# Patient Record
Sex: FEMALE | Race: WHITE | Employment: OTHER
[De-identification: names, ages, dates, MRNs, and addresses within clinical notes are randomized per-mention and may not be internally consistent; named-entity substitution may affect disease eponyms.]

---

## 2017-01-30 ENCOUNTER — OFFICE VISIT (OUTPATIENT)
Dept: OBGYN | Facility: CLINIC | Age: 82
End: 2017-01-30

## 2017-01-30 VITALS
HEART RATE: 68 BPM | DIASTOLIC BLOOD PRESSURE: 80 MMHG | HEIGHT: 68 IN | RESPIRATION RATE: 16 BRPM | SYSTOLIC BLOOD PRESSURE: 136 MMHG | WEIGHT: 202 LBS | BODY MASS INDEX: 30.62 KG/M2

## 2017-01-30 DIAGNOSIS — Z01.411 ENCOUNTER FOR GYNECOLOGICAL EXAMINATION WITH ABNORMAL FINDING: Primary | ICD-10-CM

## 2017-01-30 DIAGNOSIS — Z00.00 ENCOUNTER FOR PREVENTIVE HEALTH EXAMINATION: ICD-10-CM

## 2017-01-30 DIAGNOSIS — Z12.31 ENCOUNTER FOR SCREENING MAMMOGRAM FOR BREAST CANCER: ICD-10-CM

## 2017-01-30 DIAGNOSIS — N81.11 MIDLINE CYSTOCELE: ICD-10-CM

## 2017-01-30 PROCEDURE — 57160 INSERT PESSARY/OTHER DEVICE: CPT | Performed by: OBSTETRICS & GYNECOLOGY

## 2017-01-30 PROCEDURE — A4562 PESSARY, NON RUBBER,ANY TYPE: HCPCS | Performed by: OBSTETRICS & GYNECOLOGY

## 2017-02-02 ENCOUNTER — OFFICE VISIT (OUTPATIENT)
Dept: OBGYN | Facility: CLINIC | Age: 82
End: 2017-02-02

## 2017-02-02 VITALS
DIASTOLIC BLOOD PRESSURE: 70 MMHG | WEIGHT: 202 LBS | SYSTOLIC BLOOD PRESSURE: 168 MMHG | BODY MASS INDEX: 30.62 KG/M2 | HEIGHT: 68 IN

## 2017-02-02 DIAGNOSIS — N39.498 OTHER URINARY INCONTINENCE: ICD-10-CM

## 2017-02-02 DIAGNOSIS — N81.10 CYSTOURETHROCELE: Primary | ICD-10-CM

## 2017-02-02 PROCEDURE — 99213 OFFICE O/P EST LOW 20 MIN: CPT | Performed by: OBSTETRICS & GYNECOLOGY

## 2017-02-09 ENCOUNTER — OFFICE VISIT (OUTPATIENT)
Dept: OBGYN | Facility: CLINIC | Age: 82
End: 2017-02-09

## 2017-02-09 VITALS
DIASTOLIC BLOOD PRESSURE: 76 MMHG | SYSTOLIC BLOOD PRESSURE: 130 MMHG | BODY MASS INDEX: 30.46 KG/M2 | HEIGHT: 68 IN | WEIGHT: 201 LBS

## 2017-02-09 DIAGNOSIS — N81.10 CYSTOURETHROCELE: Primary | ICD-10-CM

## 2017-02-09 PROCEDURE — 99213 OFFICE O/P EST LOW 20 MIN: CPT | Performed by: OBSTETRICS & GYNECOLOGY

## 2017-03-27 ENCOUNTER — OFFICE VISIT (OUTPATIENT)
Dept: OBGYN | Age: 82
End: 2017-03-27
Payer: MEDICARE

## 2017-03-27 VITALS
RESPIRATION RATE: 18 BRPM | WEIGHT: 201 LBS | HEIGHT: 68 IN | DIASTOLIC BLOOD PRESSURE: 82 MMHG | BODY MASS INDEX: 30.46 KG/M2 | SYSTOLIC BLOOD PRESSURE: 142 MMHG

## 2017-03-27 DIAGNOSIS — N81.10 CYSTOURETHROCELE: Primary | ICD-10-CM

## 2017-03-27 PROCEDURE — 4040F PNEUMOC VAC/ADMIN/RCVD: CPT | Performed by: OBSTETRICS & GYNECOLOGY

## 2017-03-27 PROCEDURE — G8417 CALC BMI ABV UP PARAM F/U: HCPCS | Performed by: OBSTETRICS & GYNECOLOGY

## 2017-03-27 PROCEDURE — 99213 OFFICE O/P EST LOW 20 MIN: CPT | Performed by: OBSTETRICS & GYNECOLOGY

## 2017-03-27 PROCEDURE — G8427 DOCREV CUR MEDS BY ELIG CLIN: HCPCS | Performed by: OBSTETRICS & GYNECOLOGY

## 2017-03-27 PROCEDURE — 1036F TOBACCO NON-USER: CPT | Performed by: OBSTETRICS & GYNECOLOGY

## 2017-03-27 PROCEDURE — G8400 PT W/DXA NO RESULTS DOC: HCPCS | Performed by: OBSTETRICS & GYNECOLOGY

## 2017-03-27 PROCEDURE — G8484 FLU IMMUNIZE NO ADMIN: HCPCS | Performed by: OBSTETRICS & GYNECOLOGY

## 2017-03-27 PROCEDURE — 1123F ACP DISCUSS/DSCN MKR DOCD: CPT | Performed by: OBSTETRICS & GYNECOLOGY

## 2017-03-27 PROCEDURE — 1090F PRES/ABSN URINE INCON ASSESS: CPT | Performed by: OBSTETRICS & GYNECOLOGY

## 2017-03-28 DIAGNOSIS — N81.6 RECTOCELE: Primary | ICD-10-CM

## 2017-06-15 ENCOUNTER — HOSPITAL ENCOUNTER (OUTPATIENT)
Dept: LAB | Age: 82
Discharge: HOME | End: 2017-06-15
Payer: MEDICARE

## 2017-06-15 DIAGNOSIS — I10 ESSENTIAL HYPERTENSION: ICD-10-CM

## 2017-06-15 DIAGNOSIS — E78.2 MIXED HYPERLIPIDEMIA: ICD-10-CM

## 2017-06-15 DIAGNOSIS — R73.09 ABNORMAL GLUCOSE: ICD-10-CM

## 2017-06-16 ENCOUNTER — HOSPITAL ENCOUNTER (OUTPATIENT)
Dept: LAB | Age: 82
Discharge: HOME | End: 2017-06-16
Payer: MEDICARE

## 2017-06-16 LAB
ANION GAP SERPL CALCULATED.3IONS-SCNC: 14 MMOL/L (ref 9–17)
BUN BLDV-MCNC: 13 MG/DL (ref 8–23)
BUN/CREAT BLD: 20 (ref 9–20)
CALCIUM SERPL-MCNC: 9.5 MG/DL (ref 8.6–10.4)
CHLORIDE BLD-SCNC: 95 MMOL/L (ref 98–107)
CHOLESTEROL/HDL RATIO: 2.3
CHOLESTEROL: 153 MG/DL
CO2: 25 MMOL/L (ref 20–31)
CREAT SERPL-MCNC: 0.65 MG/DL (ref 0.5–0.9)
ESTIMATED AVERAGE GLUCOSE: 123 MG/DL
GFR AFRICAN AMERICAN: >60 ML/MIN
GFR NON-AFRICAN AMERICAN: >60 ML/MIN
GFR SERPL CREATININE-BSD FRML MDRD: ABNORMAL ML/MIN/{1.73_M2}
GFR SERPL CREATININE-BSD FRML MDRD: ABNORMAL ML/MIN/{1.73_M2}
GLUCOSE BLD-MCNC: 131 MG/DL (ref 70–99)
HBA1C MFR BLD: 5.9 % (ref 4.8–5.9)
HDLC SERPL-MCNC: 67 MG/DL
LDL CHOLESTEROL: 72 MG/DL (ref 0–130)
POTASSIUM SERPL-SCNC: 4.6 MMOL/L (ref 3.7–5.3)
SODIUM BLD-SCNC: 134 MMOL/L (ref 135–144)
TRIGL SERPL-MCNC: 69 MG/DL
VLDLC SERPL CALC-MCNC: NORMAL MG/DL (ref 1–30)

## 2017-06-16 PROCEDURE — 36415 COLL VENOUS BLD VENIPUNCTURE: CPT

## 2017-06-16 PROCEDURE — 80048 BASIC METABOLIC PNL TOTAL CA: CPT

## 2017-06-16 PROCEDURE — 83036 HEMOGLOBIN GLYCOSYLATED A1C: CPT

## 2017-06-16 PROCEDURE — 80061 LIPID PANEL: CPT

## 2017-12-06 ENCOUNTER — HOSPITAL ENCOUNTER (OUTPATIENT)
Dept: GENERAL RADIOLOGY | Age: 82
Discharge: HOME | End: 2017-12-06
Payer: MEDICARE

## 2017-12-06 ENCOUNTER — HOSPITAL ENCOUNTER (OUTPATIENT)
Dept: LAB | Age: 82
Discharge: HOME | End: 2017-12-06
Payer: MEDICARE

## 2017-12-06 DIAGNOSIS — M54.50 CHRONIC MIDLINE LOW BACK PAIN WITHOUT SCIATICA: ICD-10-CM

## 2017-12-06 DIAGNOSIS — G89.29 CHRONIC MIDLINE LOW BACK PAIN WITHOUT SCIATICA: ICD-10-CM

## 2017-12-06 DIAGNOSIS — H20.9 IRITIS OF RIGHT EYE: ICD-10-CM

## 2017-12-06 DIAGNOSIS — M45.9 ANKYLOSING SPONDYLITIS, UNSPECIFIED SITE OF SPINE (HCC): ICD-10-CM

## 2017-12-06 LAB
ANGIOTENSIN-CONVERTING ENZYME: 61 U/L (ref 8–52)
C-REACTIVE PROTEIN: 1.1 MG/L (ref 0–5)
SEDIMENTATION RATE, ERYTHROCYTE: 21 MM (ref 0–20)

## 2017-12-06 PROCEDURE — 85651 RBC SED RATE NONAUTOMATED: CPT

## 2017-12-06 PROCEDURE — 86140 C-REACTIVE PROTEIN: CPT

## 2017-12-06 PROCEDURE — 82164 ANGIOTENSIN I ENZYME TEST: CPT

## 2017-12-06 PROCEDURE — 72100 X-RAY EXAM L-S SPINE 2/3 VWS: CPT

## 2017-12-06 PROCEDURE — 86812 HLA TYPING A B OR C: CPT

## 2017-12-06 PROCEDURE — 36415 COLL VENOUS BLD VENIPUNCTURE: CPT

## 2017-12-06 PROCEDURE — 85549 MURAMIDASE: CPT

## 2017-12-08 LAB — HLA B27: POSITIVE

## 2017-12-11 LAB — LYSOZYME: 1.52 UG/ML (ref 0–2.75)

## 2018-01-31 PROBLEM — Z15.89 HLA B27 (HLA B27 POSITIVE): Status: ACTIVE | Noted: 2018-01-31

## 2018-08-24 ENCOUNTER — HOSPITAL ENCOUNTER (EMERGENCY)
Age: 83
Discharge: HOME | End: 2018-08-24
Payer: MEDICARE

## 2018-08-24 VITALS
SYSTOLIC BLOOD PRESSURE: 166 MMHG | RESPIRATION RATE: 18 BRPM | OXYGEN SATURATION: 96 % | WEIGHT: 200 LBS | HEART RATE: 61 BPM | TEMPERATURE: 98.1 F | DIASTOLIC BLOOD PRESSURE: 84 MMHG | BODY MASS INDEX: 31.32 KG/M2

## 2018-08-24 DIAGNOSIS — K64.4 EXTERNAL BLEEDING HEMORRHOIDS: Primary | ICD-10-CM

## 2018-08-24 LAB
ABSOLUTE EOS #: 0.34 K/UL (ref 0–0.44)
ABSOLUTE IMMATURE GRANULOCYTE: 0.05 K/UL (ref 0–0.3)
ABSOLUTE LYMPH #: 1.65 K/UL (ref 1.1–3.7)
ABSOLUTE MONO #: 0.98 K/UL (ref 0.1–1.2)
ALBUMIN SERPL-MCNC: 4.1 G/DL (ref 3.5–5.2)
ALBUMIN/GLOBULIN RATIO: 1.4 (ref 1–2.5)
ALP BLD-CCNC: 77 U/L (ref 35–104)
ALT SERPL-CCNC: 6 U/L (ref 5–33)
ANION GAP SERPL CALCULATED.3IONS-SCNC: 14 MMOL/L (ref 9–17)
AST SERPL-CCNC: 18 U/L
BASOPHILS # BLD: 0 % (ref 0–2)
BASOPHILS ABSOLUTE: <0.03 K/UL (ref 0–0.2)
BILIRUB SERPL-MCNC: 0.68 MG/DL (ref 0.3–1.2)
BUN BLDV-MCNC: 13 MG/DL (ref 8–23)
BUN/CREAT BLD: 21 (ref 9–20)
CALCIUM SERPL-MCNC: 9.5 MG/DL (ref 8.6–10.4)
CHLORIDE BLD-SCNC: 96 MMOL/L (ref 98–107)
CO2: 24 MMOL/L (ref 20–31)
CREAT SERPL-MCNC: 0.61 MG/DL (ref 0.5–0.9)
DIFFERENTIAL TYPE: ABNORMAL
EOSINOPHILS RELATIVE PERCENT: 5 % (ref 1–4)
GFR AFRICAN AMERICAN: >60 ML/MIN
GFR NON-AFRICAN AMERICAN: >60 ML/MIN
GFR SERPL CREATININE-BSD FRML MDRD: ABNORMAL ML/MIN/{1.73_M2}
GFR SERPL CREATININE-BSD FRML MDRD: ABNORMAL ML/MIN/{1.73_M2}
GLUCOSE BLD-MCNC: 140 MG/DL (ref 70–99)
HCT VFR BLD CALC: 39.6 % (ref 36.3–47.1)
HEMOGLOBIN: 13.6 G/DL (ref 11.9–15.1)
IMMATURE GRANULOCYTES: 1 %
LYMPHOCYTES # BLD: 22 % (ref 24–43)
MCH RBC QN AUTO: 31.3 PG (ref 25.2–33.5)
MCHC RBC AUTO-ENTMCNC: 34.3 G/DL (ref 28.4–34.8)
MCV RBC AUTO: 91 FL (ref 82.6–102.9)
MONOCYTES # BLD: 13 % (ref 3–12)
NRBC AUTOMATED: 0 PER 100 WBC
PDW BLD-RTO: 12.4 % (ref 11.8–14.4)
PLATELET # BLD: 228 K/UL (ref 138–453)
PLATELET ESTIMATE: ABNORMAL
PMV BLD AUTO: 9.8 FL (ref 8.1–13.5)
POTASSIUM SERPL-SCNC: 3.7 MMOL/L (ref 3.7–5.3)
RBC # BLD: 4.35 M/UL (ref 3.95–5.11)
RBC # BLD: ABNORMAL 10*6/UL
SEG NEUTROPHILS: 59 % (ref 36–65)
SEGMENTED NEUTROPHILS ABSOLUTE COUNT: 4.6 K/UL (ref 1.5–8.1)
SODIUM BLD-SCNC: 134 MMOL/L (ref 135–144)
TOTAL PROTEIN: 7.1 G/DL (ref 6.4–8.3)
WBC # BLD: 7.6 K/UL (ref 3.5–11.3)
WBC # BLD: ABNORMAL 10*3/UL

## 2018-08-24 PROCEDURE — 99283 EMERGENCY DEPT VISIT LOW MDM: CPT

## 2018-08-24 PROCEDURE — 85025 COMPLETE CBC W/AUTO DIFF WBC: CPT

## 2018-08-24 PROCEDURE — 80053 COMPREHEN METABOLIC PANEL: CPT

## 2018-08-24 ASSESSMENT — ENCOUNTER SYMPTOMS
DIARRHEA: 0
ABDOMINAL PAIN: 0
RECTAL PAIN: 0
VOMITING: 0
CONSTIPATION: 0
NAUSEA: 0
ANAL BLEEDING: 1

## 2018-08-24 NOTE — ED NOTES
[facility].       Edith Kimball  08/24/18 2126

## 2018-08-24 NOTE — ED PROVIDER NOTES
[facility] [[email or web address]] ED  eMERGENCY dEPARTMENT eNCOUnter      Pt Name: Ailyn Rubi  MRN: [id number]  [place] 11/4/1933  Date of evaluation: 8/24/2018  Provider: Eric Salas PA-C,    [address] [place] [address]       Chief Complaint   Patient presents with    Rectal Bleeding     pt states she had 2 bright red stools this am       HISTORY OF PRESENT ILLNESS    Ailyn Rubi is a 80 y.o. female who presents to the emergency department from home Place of bright red rectal bleeding which started this morning. Patient denies any abdominal or rectal pain, hematuria, nausea or vomiting or diarrhea, constipation, lightheadedness or any other complaints at present. Triage notes and Nursing notes were reviewed by myself. Any discrepancies are addressed above. PAST MEDICAL HISTORY     Past Medical History:   Diagnosis Date    Allergic rhinitis     Anxiety     Arthritis     DVT (deep venous thrombosis) ([facility] [address] 1970    rt leg       Essential hypertension     Hyperlipidemia     Impaired fasting glucose 2010    Osteoarthritis of right knee / Right TKA 2014 8/1/2014    PONV (postoperative nausea and vomiting)     Shingles 2009    left arm    Stroke [facility])     Cerebral atherosclerosis MRI confirmed       SURGICAL HISTORY       Past Surgical History:   Procedure Laterality Date    APPENDECTOMY      BREAST SURGERY Left     bx    EYE SURGERY      lorena cataracts    JOINT REPLACEMENT Left 1/11/2016    LEFT TOTAL KNEE [place]b/    KNEE ARTHROPLASTY Right 2014    TOTAL KNEE    KNEE ARTHROSCOPY Left 05/16/2016    Dr. Daniel Members Right 2-       CURRENT MEDICATIONS       Previous Medications    ACETAMINOPHEN (TYLENOL) 500 MG TABLET    Take 500 mg by mouth every 6 hours as needed for Pain.     AMLODIPINE (NORVASC) 5 MG TABLET    Take 1 tablet by mouth daily    ASPIRIN EC 81 MG EC TABLET    Take 1 tablet by mouth daily    CARVEDILOL (COREG) 25 MG TABLET    Take 1 tablet by mouth 2 times daily    HYDROCHLOROTHIAZIDE (HYDRODIURIL) 25 MG TABLET    Take 1 tablet by mouth daily    IBUPROFEN (ADVIL;MOTRIN) 200 MG TABLET    Take 400 mg by mouth every 6 hours as needed for Pain    KLOR-CON M10 10 MEQ EXTENDED RELEASE TABLET    TAKE 1 TABLET DAILY WITH BREAKFAST    MULTIPLE VITAMINS-MINERALS (THERAPEUTIC MULTIVITAMIN-MINERALS) TABLET    Take 1 tablet by mouth daily. OMEPRAZOLE (PRILOSEC) 40 MG DELAYED RELEASE CAPSULE        SCOPOLAMINE (TRANSDERM-SCOP, 1.5 MG,) TRANSDERMAL PATCH    Place 1 patch onto the skin every 72 hours    SIMVASTATIN (ZOCOR) 40 MG TABLET    Take 1 tablet by mouth daily       ALLERGIES     Percocet [oxycodone-acetaminophen]    FAMILY HISTORY       Family History   Problem Relation Age of Onset    Heart Disease Mother     Heart Disease Father     Heart Disease Brother     Heart Disease Sister     Cancer Brother     Heart Disease Sister     Cancer Sister         SOCIAL HISTORY       Social History     Social History    Marital status:      Spouse name: N/A    Number of children: N/A    Years of education: N/A     Social History Main Topics    Smoking status: Never Smoker    Smokeless tobacco: Never Used    Alcohol use No    Drug use: No    Sexual activity: Not Currently     Other Topics Concern    None     Social History Narrative    None       REVIEW OF SYSTEMS       Review of Systems   Constitutional: Negative for fever. Gastrointestinal: Positive for anal bleeding. Negative for abdominal pain, constipation, diarrhea, nausea, rectal pain and vomiting. Genitourinary: Negative for difficulty urinating, flank pain and hematuria. Neurological: Negative for light-headedness. All other systems reviewed and are negative.     Review of systems as in HPI & PMH otherwise negative    PHYSICAL EXAM    (up to 7 for level 4, 8 or more for level 5)     ED Triage Vitals [08/24/18 1145]   BP Temp Temp Source Pulse Resp SpO2 Height Weight   (!) 166/84 98.1 °F (36.7 °C) Tympanic 61 18 96 % -- 200 lb (90.7 kg)       Physical Exam   Constitutional: She appears well-developed and well-nourished. HENT:   Head: Normocephalic and atraumatic. Eyes: Conjunctivae are normal. No scleral icterus. Neck: Normal range of motion. Neck supple. Cardiovascular: Normal rate and regular rhythm. Pulmonary/Chest: Effort normal and breath sounds normal.   Abdominal: Soft. Bowel sounds are normal.   Genitourinary: Rectal exam shows guaiac positive stool. Genitourinary Comments: Large external hemorrhoid oozing blood, guaiac positive from the hemorrhoid,  brown stool   Musculoskeletal: Normal range of motion. Neurological: She is alert. Skin: Skin is warm. Psychiatric: She has a normal mood and affect. Her behavior is normal. Judgment and thought content normal.   Nursing note and vitals reviewed. DIAGNOSTIC RESULTS     EKG: (none if blank)  All EKG's are interpreted by the Emergency Department Physician who either signs or Co-signs this chart in the absence of a cardiologist.        RADIOLOGY: (none if blank)   Interpretation per the Radiologist below, if available at the time of this note:    No orders to display       LABS:  Labs Reviewed   CBC WITH AUTO DIFFERENTIAL - Abnormal; Notable for the following:        Result Value    Lymphocytes 22 (*)     Monocytes 13 (*)     Eosinophils % 5 (*)     Immature Granulocytes 1 (*)     All other components within normal limits   COMPREHENSIVE METABOLIC PANEL - Abnormal; Notable for the following:     Glucose 140 (*)     Bun/Cre Ratio 21 (*)     Sodium 134 (*)     Chloride 96 (*)     All other components within normal limits       All other labs were within normal range or not returned as of this dictation. EMERGENCY DEPARTMENT COURSE and Medical Decision Making:     MDM/     Nurse's notes were reviewed as were the vital signs. Labs were obtained results are reviewed.   Again patient does have a large external hemorrhoid which is not

## 2018-09-24 ENCOUNTER — HOSPITAL ENCOUNTER (OUTPATIENT)
Dept: LAB | Age: 83
Discharge: HOME | End: 2018-09-24
Payer: MEDICARE

## 2018-09-24 DIAGNOSIS — R73.01 IMPAIRED FASTING GLUCOSE: Chronic | ICD-10-CM

## 2018-09-24 DIAGNOSIS — E78.2 MIXED HYPERLIPIDEMIA: ICD-10-CM

## 2018-09-24 DIAGNOSIS — I10 ESSENTIAL HYPERTENSION: ICD-10-CM

## 2018-09-24 LAB
ALBUMIN SERPL-MCNC: 4.1 G/DL (ref 3.5–5.2)
ALBUMIN/GLOBULIN RATIO: 1.4 (ref 1–2.5)
ALP BLD-CCNC: 72 U/L (ref 35–104)
ALT SERPL-CCNC: 11 U/L (ref 5–33)
ANION GAP SERPL CALCULATED.3IONS-SCNC: 13 MMOL/L (ref 9–17)
AST SERPL-CCNC: 19 U/L
BILIRUB SERPL-MCNC: 0.45 MG/DL (ref 0.3–1.2)
BUN BLDV-MCNC: 16 MG/DL (ref 8–23)
BUN/CREAT BLD: 24 (ref 9–20)
CALCIUM SERPL-MCNC: 9.2 MG/DL (ref 8.6–10.4)
CHLORIDE BLD-SCNC: 95 MMOL/L (ref 98–107)
CHOLESTEROL, FASTING: 139 MG/DL
CHOLESTEROL/HDL RATIO: 2.5
CO2: 25 MMOL/L (ref 20–31)
CREAT SERPL-MCNC: 0.68 MG/DL (ref 0.5–0.9)
ESTIMATED AVERAGE GLUCOSE: 131 MG/DL
GFR AFRICAN AMERICAN: >60 ML/MIN
GFR NON-AFRICAN AMERICAN: >60 ML/MIN
GFR SERPL CREATININE-BSD FRML MDRD: ABNORMAL ML/MIN/{1.73_M2}
GFR SERPL CREATININE-BSD FRML MDRD: ABNORMAL ML/MIN/{1.73_M2}
GLUCOSE FASTING: 121 MG/DL (ref 70–99)
HBA1C MFR BLD: 6.2 % (ref 4.8–5.9)
HDLC SERPL-MCNC: 56 MG/DL
LDL CHOLESTEROL: 60 MG/DL (ref 0–130)
POTASSIUM SERPL-SCNC: 4.2 MMOL/L (ref 3.7–5.3)
SODIUM BLD-SCNC: 133 MMOL/L (ref 135–144)
TOTAL PROTEIN: 7.1 G/DL (ref 6.4–8.3)
TRIGLYCERIDE, FASTING: 113 MG/DL
VLDLC SERPL CALC-MCNC: NORMAL MG/DL (ref 1–30)

## 2018-09-24 PROCEDURE — 80061 LIPID PANEL: CPT

## 2018-09-24 PROCEDURE — 36415 COLL VENOUS BLD VENIPUNCTURE: CPT

## 2018-09-24 PROCEDURE — 80053 COMPREHEN METABOLIC PANEL: CPT

## 2018-09-24 PROCEDURE — 83036 HEMOGLOBIN GLYCOSYLATED A1C: CPT

## 2019-02-25 PROBLEM — G56.01 RIGHT CARPAL TUNNEL SYNDROME: Status: ACTIVE | Noted: 2019-02-25

## 2019-10-30 ENCOUNTER — HOSPITAL ENCOUNTER (OUTPATIENT)
Age: 84
Setting detail: OUTPATIENT SURGERY
Discharge: HOME | End: 2019-10-30
Attending: PHYSICAL MEDICINE & REHABILITATION | Admitting: PHYSICAL MEDICINE & REHABILITATION
Payer: MEDICARE

## 2019-10-30 ENCOUNTER — APPOINTMENT (OUTPATIENT)
Dept: GENERAL RADIOLOGY | Age: 84
End: 2019-10-30
Attending: PHYSICAL MEDICINE & REHABILITATION
Payer: MEDICARE

## 2019-10-30 VITALS
DIASTOLIC BLOOD PRESSURE: 71 MMHG | HEART RATE: 84 BPM | SYSTOLIC BLOOD PRESSURE: 151 MMHG | TEMPERATURE: 98 F | OXYGEN SATURATION: 96 % | HEIGHT: 67 IN | BODY MASS INDEX: 31.39 KG/M2 | WEIGHT: 200 LBS | RESPIRATION RATE: 18 BRPM

## 2019-10-30 PROBLEM — M53.3 SACROILIAC JOINT DYSFUNCTION OF RIGHT SIDE: Status: ACTIVE | Noted: 2019-10-30

## 2019-10-30 PROBLEM — G57.01 PIRIFORMIS SYNDROME OF RIGHT SIDE: Status: ACTIVE | Noted: 2019-10-30

## 2019-10-30 PROCEDURE — 2709999900 HC NON-CHARGEABLE SUPPLY: Performed by: PHYSICAL MEDICINE & REHABILITATION

## 2019-10-30 PROCEDURE — 3209999900 FLUORO FOR SURGICAL PROCEDURES

## 2019-10-30 PROCEDURE — 6360000004 HC RX CONTRAST MEDICATION: Performed by: PHYSICAL MEDICINE & REHABILITATION

## 2019-10-30 PROCEDURE — 2500000003 HC RX 250 WO HCPCS: Performed by: PHYSICAL MEDICINE & REHABILITATION

## 2019-10-30 PROCEDURE — 6360000002 HC RX W HCPCS: Performed by: PHYSICAL MEDICINE & REHABILITATION

## 2019-10-30 PROCEDURE — 3600000002 HC SURGERY LEVEL 2 BASE: Performed by: PHYSICAL MEDICINE & REHABILITATION

## 2019-10-30 RX ORDER — LIDOCAINE HYDROCHLORIDE 20 MG/ML
INJECTION, SOLUTION EPIDURAL; INFILTRATION; INTRACAUDAL; PERINEURAL PRN
Status: DISCONTINUED | OUTPATIENT
Start: 2019-10-30 | End: 2019-10-30 | Stop reason: ALTCHOICE

## 2019-10-30 RX ORDER — DEXAMETHASONE SODIUM PHOSPHATE 10 MG/ML
INJECTION INTRAMUSCULAR; INTRAVENOUS PRN
Status: DISCONTINUED | OUTPATIENT
Start: 2019-10-30 | End: 2019-10-30 | Stop reason: ALTCHOICE

## 2019-10-30 RX ORDER — LIDOCAINE HYDROCHLORIDE 10 MG/ML
INJECTION, SOLUTION EPIDURAL; INFILTRATION; INTRACAUDAL; PERINEURAL PRN
Status: DISCONTINUED | OUTPATIENT
Start: 2019-10-30 | End: 2019-10-30 | Stop reason: ALTCHOICE

## 2019-10-30 RX ORDER — BUPIVACAINE HYDROCHLORIDE 5 MG/ML
INJECTION, SOLUTION EPIDURAL; INTRACAUDAL PRN
Status: DISCONTINUED | OUTPATIENT
Start: 2019-10-30 | End: 2019-10-30 | Stop reason: ALTCHOICE

## 2019-10-30 ASSESSMENT — PAIN DESCRIPTION - ORIENTATION: ORIENTATION: RIGHT

## 2019-10-30 ASSESSMENT — PAIN DESCRIPTION - LOCATION: LOCATION: HIP

## 2019-10-30 ASSESSMENT — PAIN SCALES - GENERAL: PAINLEVEL_OUTOF10: 4

## 2019-10-30 ASSESSMENT — PAIN DESCRIPTION - PAIN TYPE: TYPE: CHRONIC PAIN

## 2020-01-09 ENCOUNTER — HOSPITAL ENCOUNTER (OUTPATIENT)
Dept: LAB | Age: 85
Discharge: HOME | End: 2020-01-09
Payer: MEDICARE

## 2020-01-09 LAB
ESTIMATED AVERAGE GLUCOSE: 131 MG/DL
HBA1C MFR BLD: 6.2 % (ref 4.8–5.9)

## 2020-01-09 PROCEDURE — 83036 HEMOGLOBIN GLYCOSYLATED A1C: CPT

## 2020-01-09 PROCEDURE — 36415 COLL VENOUS BLD VENIPUNCTURE: CPT

## 2020-07-07 ENCOUNTER — HOSPITAL ENCOUNTER (OUTPATIENT)
Age: 85
Discharge: HOME | End: 2020-07-09
Payer: MEDICARE

## 2020-07-07 ENCOUNTER — HOSPITAL ENCOUNTER (OUTPATIENT)
Dept: GENERAL RADIOLOGY | Age: 85
Discharge: HOME | End: 2020-07-09
Payer: MEDICARE

## 2020-07-07 PROCEDURE — 71046 X-RAY EXAM CHEST 2 VIEWS: CPT

## 2020-10-09 PROBLEM — M45.9 ANKYLOSING SPONDYLITIS (HCC): Status: ACTIVE | Noted: 2020-10-09

## 2020-10-20 ENCOUNTER — HOSPITAL ENCOUNTER (OUTPATIENT)
Dept: LAB | Age: 85
Discharge: HOME | End: 2020-10-20
Payer: MEDICARE

## 2020-10-20 LAB
ALBUMIN SERPL-MCNC: 4 G/DL (ref 3.5–5.2)
ALBUMIN/GLOBULIN RATIO: 1.3 (ref 1–2.5)
ALP BLD-CCNC: 74 U/L (ref 35–104)
ALT SERPL-CCNC: 10 U/L (ref 5–33)
ANION GAP SERPL CALCULATED.3IONS-SCNC: 10 MMOL/L (ref 9–17)
AST SERPL-CCNC: 15 U/L
BILIRUB SERPL-MCNC: 0.5 MG/DL (ref 0.3–1.2)
BUN BLDV-MCNC: 15 MG/DL (ref 8–23)
BUN/CREAT BLD: 21 (ref 9–20)
CALCIUM SERPL-MCNC: 9.1 MG/DL (ref 8.6–10.4)
CHLORIDE BLD-SCNC: 95 MMOL/L (ref 98–107)
CHOLESTEROL, FASTING: 141 MG/DL
CHOLESTEROL/HDL RATIO: 2.3
CO2: 27 MMOL/L (ref 20–31)
CREAT SERPL-MCNC: 0.72 MG/DL (ref 0.5–0.9)
CREATININE URINE: 66.6 MG/DL (ref 28–217)
ESTIMATED AVERAGE GLUCOSE: 143 MG/DL
GFR AFRICAN AMERICAN: >60 ML/MIN
GFR NON-AFRICAN AMERICAN: >60 ML/MIN
GFR SERPL CREATININE-BSD FRML MDRD: ABNORMAL ML/MIN/{1.73_M2}
GFR SERPL CREATININE-BSD FRML MDRD: ABNORMAL ML/MIN/{1.73_M2}
GLUCOSE FASTING: 129 MG/DL (ref 70–99)
HBA1C MFR BLD: 6.6 % (ref 4–6)
HDLC SERPL-MCNC: 62 MG/DL
LDL CHOLESTEROL: 65 MG/DL (ref 0–130)
MICROALBUMIN/CREAT 24H UR: 13 MG/L
MICROALBUMIN/CREAT UR-RTO: 20 MCG/MG CREAT
POTASSIUM SERPL-SCNC: 3.8 MMOL/L (ref 3.7–5.3)
SODIUM BLD-SCNC: 132 MMOL/L (ref 135–144)
TOTAL PROTEIN: 7.1 G/DL (ref 6.4–8.3)
TRIGLYCERIDE, FASTING: 71 MG/DL
VLDLC SERPL CALC-MCNC: NORMAL MG/DL (ref 1–30)

## 2020-10-20 PROCEDURE — 80061 LIPID PANEL: CPT

## 2020-10-20 PROCEDURE — 83036 HEMOGLOBIN GLYCOSYLATED A1C: CPT

## 2020-10-20 PROCEDURE — 82570 ASSAY OF URINE CREATININE: CPT

## 2020-10-20 PROCEDURE — 36415 COLL VENOUS BLD VENIPUNCTURE: CPT

## 2020-10-20 PROCEDURE — 82043 UR ALBUMIN QUANTITATIVE: CPT

## 2020-10-20 PROCEDURE — 80053 COMPREHEN METABOLIC PANEL: CPT

## 2021-04-12 PROBLEM — M62.81 ABNORMAL GAIT DUE TO MUSCLE WEAKNESS: Status: ACTIVE | Noted: 2021-04-12

## 2021-04-12 PROBLEM — R26.9 ABNORMAL GAIT DUE TO MUSCLE WEAKNESS: Status: ACTIVE | Noted: 2021-04-12

## 2021-04-12 PROBLEM — R06.09 DYSPNEA ON EXERTION: Status: ACTIVE | Noted: 2021-04-12

## 2021-04-12 PROBLEM — E11.9 TYPE 2 DIABETES MELLITUS WITHOUT COMPLICATION, WITHOUT LONG-TERM CURRENT USE OF INSULIN (HCC): Status: ACTIVE | Noted: 2021-04-12

## 2021-05-07 ENCOUNTER — HOSPITAL ENCOUNTER (OUTPATIENT)
Dept: CT IMAGING | Age: 86
Discharge: HOME | End: 2021-05-09
Payer: MEDICARE

## 2021-05-07 ENCOUNTER — HOSPITAL ENCOUNTER (OUTPATIENT)
Dept: NON INVASIVE DIAGNOSTICS | Age: 86
Discharge: HOME | End: 2021-05-07
Payer: MEDICARE

## 2021-05-07 DIAGNOSIS — R06.09 DYSPNEA ON EXERTION: ICD-10-CM

## 2021-05-07 LAB
LV EF: 65 %
LVEF MODALITY: NORMAL

## 2021-05-07 PROCEDURE — 71250 CT THORAX DX C-: CPT

## 2021-05-07 PROCEDURE — 93306 TTE W/DOPPLER COMPLETE: CPT

## 2021-05-26 ENCOUNTER — HOSPITAL ENCOUNTER (OUTPATIENT)
Age: 86
Discharge: HOME | End: 2021-05-26
Payer: MEDICARE

## 2021-05-26 DIAGNOSIS — R06.09 DYSPNEA ON EXERTION: ICD-10-CM

## 2021-05-26 LAB
ABSOLUTE EOS #: 0.3 K/UL (ref 0–0.44)
ABSOLUTE IMMATURE GRANULOCYTE: <0.03 K/UL (ref 0–0.3)
ABSOLUTE LYMPH #: 1.25 K/UL (ref 1.1–3.7)
ABSOLUTE MONO #: 1.04 K/UL (ref 0.1–1.2)
BASOPHILS # BLD: 1 % (ref 0–2)
BASOPHILS ABSOLUTE: 0.03 K/UL (ref 0–0.2)
DIFFERENTIAL TYPE: ABNORMAL
EOSINOPHILS RELATIVE PERCENT: 5 % (ref 1–4)
HCT VFR BLD CALC: 38.6 % (ref 36.3–47.1)
HEMOGLOBIN: 12.7 G/DL (ref 11.9–15.1)
IMMATURE GRANULOCYTES: 0 %
LYMPHOCYTES # BLD: 20 % (ref 24–43)
MCH RBC QN AUTO: 29.9 PG (ref 25.2–33.5)
MCHC RBC AUTO-ENTMCNC: 32.9 G/DL (ref 28.4–34.8)
MCV RBC AUTO: 90.8 FL (ref 82.6–102.9)
MONOCYTES # BLD: 17 % (ref 3–12)
NRBC AUTOMATED: 0 PER 100 WBC
PDW BLD-RTO: 14 % (ref 11.8–14.4)
PLATELET # BLD: 239 K/UL (ref 138–453)
PLATELET ESTIMATE: ABNORMAL
PMV BLD AUTO: 9.6 FL (ref 8.1–13.5)
RBC # BLD: 4.25 M/UL (ref 3.95–5.11)
RBC # BLD: ABNORMAL 10*6/UL
SEG NEUTROPHILS: 57 % (ref 36–65)
SEGMENTED NEUTROPHILS ABSOLUTE COUNT: 3.69 K/UL (ref 1.5–8.1)
WBC # BLD: 6.3 K/UL (ref 3.5–11.3)
WBC # BLD: ABNORMAL 10*3/UL

## 2021-05-26 PROCEDURE — 85025 COMPLETE CBC W/AUTO DIFF WBC: CPT

## 2021-05-26 PROCEDURE — 36415 COLL VENOUS BLD VENIPUNCTURE: CPT

## 2021-10-20 ENCOUNTER — HOSPITAL ENCOUNTER (OUTPATIENT)
Age: 86
Discharge: HOME | End: 2021-10-20
Payer: MEDICARE

## 2021-10-20 DIAGNOSIS — E11.9 TYPE 2 DIABETES MELLITUS WITHOUT COMPLICATION, WITHOUT LONG-TERM CURRENT USE OF INSULIN (HCC): ICD-10-CM

## 2021-10-20 DIAGNOSIS — E78.2 MIXED HYPERLIPIDEMIA: ICD-10-CM

## 2021-10-20 LAB
ALBUMIN SERPL-MCNC: 4.2 G/DL (ref 3.5–5.2)
ALBUMIN/GLOBULIN RATIO: 1.6 (ref 1–2.5)
ALP BLD-CCNC: 93 U/L (ref 35–104)
ALT SERPL-CCNC: 11 U/L (ref 5–33)
ANION GAP SERPL CALCULATED.3IONS-SCNC: 14 MMOL/L (ref 9–17)
AST SERPL-CCNC: 18 U/L
BILIRUB SERPL-MCNC: 0.53 MG/DL (ref 0.3–1.2)
BUN BLDV-MCNC: 16 MG/DL (ref 8–23)
BUN/CREAT BLD: 23 (ref 9–20)
CALCIUM SERPL-MCNC: 9.3 MG/DL (ref 8.6–10.4)
CHLORIDE BLD-SCNC: 95 MMOL/L (ref 98–107)
CHOLESTEROL, FASTING: 188 MG/DL
CHOLESTEROL/HDL RATIO: 2.8
CO2: 23 MMOL/L (ref 20–31)
CREAT SERPL-MCNC: 0.71 MG/DL (ref 0.5–0.9)
ESTIMATED AVERAGE GLUCOSE: 128 MG/DL
GFR AFRICAN AMERICAN: >60 ML/MIN
GFR NON-AFRICAN AMERICAN: >60 ML/MIN
GFR SERPL CREATININE-BSD FRML MDRD: ABNORMAL ML/MIN/{1.73_M2}
GFR SERPL CREATININE-BSD FRML MDRD: ABNORMAL ML/MIN/{1.73_M2}
GLUCOSE FASTING: 114 MG/DL (ref 70–99)
HBA1C MFR BLD: 6.1 % (ref 4–6)
HDLC SERPL-MCNC: 67 MG/DL
LDL CHOLESTEROL: 107 MG/DL (ref 0–130)
POTASSIUM SERPL-SCNC: 4 MMOL/L (ref 3.7–5.3)
SODIUM BLD-SCNC: 132 MMOL/L (ref 135–144)
TOTAL PROTEIN: 6.8 G/DL (ref 6.4–8.3)
TRIGLYCERIDE, FASTING: 71 MG/DL
VLDLC SERPL CALC-MCNC: NORMAL MG/DL (ref 1–30)

## 2021-10-20 PROCEDURE — 36415 COLL VENOUS BLD VENIPUNCTURE: CPT

## 2021-10-20 PROCEDURE — 80061 LIPID PANEL: CPT

## 2021-10-20 PROCEDURE — 80053 COMPREHEN METABOLIC PANEL: CPT

## 2021-10-20 PROCEDURE — 83036 HEMOGLOBIN GLYCOSYLATED A1C: CPT

## 2022-03-21 PROBLEM — M17.12 PRIMARY OSTEOARTHRITIS OF LEFT KNEE: Status: ACTIVE | Noted: 2022-03-21

## 2022-03-21 PROBLEM — M45.9 ANKYLOSING SPONDYLITIS (HCC): Status: RESOLVED | Noted: 2020-10-09 | Resolved: 2022-03-21

## 2022-03-21 PROBLEM — M25.562 LEFT LATERAL KNEE PAIN: Status: ACTIVE | Noted: 2022-03-21

## 2022-09-08 ENCOUNTER — HOSPITAL ENCOUNTER (OUTPATIENT)
Age: 87
Discharge: HOME | End: 2022-09-08
Payer: MEDICARE

## 2022-09-08 DIAGNOSIS — E11.9 TYPE 2 DIABETES MELLITUS WITHOUT COMPLICATION, WITHOUT LONG-TERM CURRENT USE OF INSULIN (HCC): ICD-10-CM

## 2022-09-08 DIAGNOSIS — E78.2 MIXED HYPERLIPIDEMIA: ICD-10-CM

## 2022-09-08 LAB
ANION GAP SERPL CALCULATED.3IONS-SCNC: 12 MMOL/L (ref 9–17)
BUN BLDV-MCNC: 13 MG/DL (ref 8–23)
BUN/CREAT BLD: 29 (ref 9–20)
CALCIUM SERPL-MCNC: 9.5 MG/DL (ref 8.6–10.4)
CHLORIDE BLD-SCNC: 90 MMOL/L (ref 98–107)
CHOLESTEROL, FASTING: 130 MG/DL
CHOLESTEROL/HDL RATIO: 2.3
CO2: 22 MMOL/L (ref 20–31)
CREAT SERPL-MCNC: 0.45 MG/DL (ref 0.5–0.9)
GFR AFRICAN AMERICAN: >60 ML/MIN
GFR NON-AFRICAN AMERICAN: >60 ML/MIN
GFR SERPL CREATININE-BSD FRML MDRD: ABNORMAL ML/MIN/{1.73_M2}
GFR SERPL CREATININE-BSD FRML MDRD: ABNORMAL ML/MIN/{1.73_M2}
GLUCOSE FASTING: 130 MG/DL (ref 70–99)
HDLC SERPL-MCNC: 56 MG/DL
LDL CHOLESTEROL: 58 MG/DL (ref 0–130)
POTASSIUM SERPL-SCNC: 4.2 MMOL/L (ref 3.7–5.3)
SODIUM BLD-SCNC: 124 MMOL/L (ref 135–144)
TRIGLYCERIDE, FASTING: 80 MG/DL

## 2022-09-08 PROCEDURE — 36415 COLL VENOUS BLD VENIPUNCTURE: CPT

## 2022-09-08 PROCEDURE — 80061 LIPID PANEL: CPT

## 2022-09-08 PROCEDURE — 83036 HEMOGLOBIN GLYCOSYLATED A1C: CPT

## 2022-09-08 PROCEDURE — 80048 BASIC METABOLIC PNL TOTAL CA: CPT

## 2022-09-09 LAB
ESTIMATED AVERAGE GLUCOSE: 131 MG/DL
HBA1C MFR BLD: 6.2 % (ref 4–6)

## 2023-06-27 ENCOUNTER — HOSPITAL ENCOUNTER (INPATIENT)
Age: 88
LOS: 1 days | Discharge: HOME | DRG: 305 | End: 2023-06-28
Attending: EMERGENCY MEDICINE | Admitting: INTERNAL MEDICINE
Payer: MEDICARE

## 2023-06-27 ENCOUNTER — APPOINTMENT (OUTPATIENT)
Dept: CT IMAGING | Age: 88
End: 2023-06-27
Payer: MEDICARE

## 2023-06-27 ENCOUNTER — HOSPITAL ENCOUNTER (EMERGENCY)
Age: 88
Discharge: ANOTHER HOSPITAL | End: 2023-06-27
Attending: STUDENT IN AN ORGANIZED HEALTH CARE EDUCATION/TRAINING PROGRAM
Payer: MEDICARE

## 2023-06-27 ENCOUNTER — APPOINTMENT (OUTPATIENT)
Dept: CT IMAGING | Age: 88
DRG: 305 | End: 2023-06-27
Payer: MEDICARE

## 2023-06-27 VITALS
OXYGEN SATURATION: 93 % | WEIGHT: 190 LBS | TEMPERATURE: 99.1 F | DIASTOLIC BLOOD PRESSURE: 67 MMHG | HEIGHT: 67 IN | SYSTOLIC BLOOD PRESSURE: 146 MMHG | HEART RATE: 69 BPM | RESPIRATION RATE: 14 BRPM | BODY MASS INDEX: 29.82 KG/M2

## 2023-06-27 DIAGNOSIS — R51.9 ACUTE NONINTRACTABLE HEADACHE, UNSPECIFIED HEADACHE TYPE: Primary | ICD-10-CM

## 2023-06-27 DIAGNOSIS — E11.9 TYPE 2 DIABETES MELLITUS WITHOUT COMPLICATION, WITHOUT LONG-TERM CURRENT USE OF INSULIN (HCC): ICD-10-CM

## 2023-06-27 DIAGNOSIS — I67.1 RIGHT INTERNAL CAROTID ARTERY ANEURYSM: Primary | ICD-10-CM

## 2023-06-27 DIAGNOSIS — I67.1 RIGHT INTERNAL CAROTID ARTERY ANEURYSM: ICD-10-CM

## 2023-06-27 DIAGNOSIS — I16.0 HYPERTENSIVE URGENCY: ICD-10-CM

## 2023-06-27 LAB
ANION GAP SERPL CALCULATED.3IONS-SCNC: 12 MMOL/L (ref 9–17)
BACTERIA URNS QL MICRO: ABNORMAL
BASOPHILS # BLD: 0.03 K/UL (ref 0–0.2)
BASOPHILS NFR BLD: 0 % (ref 0–2)
BILIRUB UR QL STRIP: NEGATIVE
BNP SERPL-MCNC: 622 PG/ML
BUN SERPL-MCNC: 16 MG/DL (ref 8–23)
BUN/CREAT SERPL: 30 (ref 9–20)
CALCIUM SERPL-MCNC: 9.2 MG/DL (ref 8.6–10.4)
CHLORIDE SERPL-SCNC: 88 MMOL/L (ref 98–107)
CLARITY UR: CLEAR
CO2 SERPL-SCNC: 22 MMOL/L (ref 20–31)
COLOR UR: YELLOW
CREAT SERPL-MCNC: 0.53 MG/DL (ref 0.5–0.9)
EOSINOPHIL # BLD: 0.4 K/UL (ref 0–0.44)
EOSINOPHILS RELATIVE PERCENT: 6 % (ref 1–4)
EPI CELLS #/AREA URNS HPF: ABNORMAL /HPF (ref 0–25)
ERYTHROCYTE [DISTWIDTH] IN BLOOD BY AUTOMATED COUNT: 12 % (ref 11.8–14.4)
GFR SERPL CREATININE-BSD FRML MDRD: >60 ML/MIN/1.73M2
GLUCOSE SERPL-MCNC: 136 MG/DL (ref 70–99)
GLUCOSE UR STRIP-MCNC: NEGATIVE MG/DL
HCT VFR BLD AUTO: 36.5 % (ref 36.3–47.1)
HGB BLD-MCNC: 12.9 G/DL (ref 11.9–15.1)
HGB UR QL STRIP.AUTO: NEGATIVE
IMM GRANULOCYTES # BLD AUTO: <0.03 K/UL (ref 0–0.3)
IMM GRANULOCYTES NFR BLD: 0 %
INR PPP: 1.1
KETONES UR STRIP-MCNC: NEGATIVE MG/DL
LEUKOCYTE ESTERASE UR QL STRIP: NEGATIVE
LYMPHOCYTES # BLD: 21 % (ref 24–43)
LYMPHOCYTES NFR BLD: 1.5 K/UL (ref 1.1–3.7)
MCH RBC QN AUTO: 31.8 PG (ref 25.2–33.5)
MCHC RBC AUTO-ENTMCNC: 35.3 G/DL (ref 28.4–34.8)
MCV RBC AUTO: 89.9 FL (ref 82.6–102.9)
MONOCYTES NFR BLD: 0.97 K/UL (ref 0.1–1.2)
MONOCYTES NFR BLD: 14 % (ref 3–12)
MUCOUS THREADS URNS QL MICRO: ABNORMAL
NEUTROPHILS NFR BLD: 59 % (ref 36–65)
NEUTS SEG NFR BLD: 4.21 K/UL (ref 1.5–8.1)
NITRITE UR QL STRIP: NEGATIVE
NRBC BLD-RTO: 0 PER 100 WBC
PARTIAL THROMBOPLASTIN TIME: 36.7 SEC (ref 26.8–34.8)
PH UR STRIP: 7 [PH] (ref 5–9)
PLATELET # BLD AUTO: 231 K/UL (ref 138–453)
PMV BLD AUTO: 9.3 FL (ref 8.1–13.5)
POTASSIUM SERPL-SCNC: 4 MMOL/L (ref 3.7–5.3)
PROT UR STRIP-MCNC: NEGATIVE MG/DL
PROTHROMBIN TIME: 14 SEC (ref 11.9–14.8)
RBC # BLD AUTO: 4.06 M/UL (ref 3.95–5.11)
RBC #/AREA URNS HPF: ABNORMAL /HPF (ref 0–2)
REASON FOR REJECTION: NORMAL
SODIUM SERPL-SCNC: 122 MMOL/L (ref 135–144)
SP GR UR STRIP: 1.01 (ref 1.01–1.02)
SPECIMEN SOURCE: NORMAL
TROPONIN I SERPL HS-MCNC: 18 NG/L (ref 0–14)
UROBILINOGEN UR STRIP-ACNC: NORMAL
WBC #/AREA URNS HPF: ABNORMAL /HPF (ref 0–5)
WBC OTHER # BLD: 7.1 K/UL (ref 3.5–11.3)
ZZ NTE CLEAN UP: ORDERED TEST: NORMAL

## 2023-06-27 PROCEDURE — 84484 ASSAY OF TROPONIN QUANT: CPT

## 2023-06-27 PROCEDURE — 36415 COLL VENOUS BLD VENIPUNCTURE: CPT

## 2023-06-27 PROCEDURE — 96375 TX/PRO/DX INJ NEW DRUG ADDON: CPT

## 2023-06-27 PROCEDURE — 80053 COMPREHEN METABOLIC PANEL: CPT

## 2023-06-27 PROCEDURE — 80048 BASIC METABOLIC PNL TOTAL CA: CPT

## 2023-06-27 PROCEDURE — 2580000003 HC RX 258: Performed by: STUDENT IN AN ORGANIZED HEALTH CARE EDUCATION/TRAINING PROGRAM

## 2023-06-27 PROCEDURE — 2500000003 HC RX 250 WO HCPCS: Performed by: STUDENT IN AN ORGANIZED HEALTH CARE EDUCATION/TRAINING PROGRAM

## 2023-06-27 PROCEDURE — 99285 EMERGENCY DEPT VISIT HI MDM: CPT

## 2023-06-27 PROCEDURE — 71260 CT THORAX DX C+: CPT

## 2023-06-27 PROCEDURE — 96365 THER/PROPH/DIAG IV INF INIT: CPT

## 2023-06-27 PROCEDURE — 81001 URINALYSIS AUTO W/SCOPE: CPT

## 2023-06-27 PROCEDURE — 85027 COMPLETE CBC AUTOMATED: CPT

## 2023-06-27 PROCEDURE — 2500000003 HC RX 250 WO HCPCS: Performed by: PEDIATRICS

## 2023-06-27 PROCEDURE — 6360000004 HC RX CONTRAST MEDICATION: Performed by: PEDIATRICS

## 2023-06-27 PROCEDURE — 93005 ELECTROCARDIOGRAM TRACING: CPT | Performed by: PEDIATRICS

## 2023-06-27 PROCEDURE — 6360000004 HC RX CONTRAST MEDICATION: Performed by: STUDENT IN AN ORGANIZED HEALTH CARE EDUCATION/TRAINING PROGRAM

## 2023-06-27 PROCEDURE — 83880 ASSAY OF NATRIURETIC PEPTIDE: CPT

## 2023-06-27 PROCEDURE — 74174 CTA ABD&PLVS W/CONTRAST: CPT

## 2023-06-27 PROCEDURE — 85610 PROTHROMBIN TIME: CPT

## 2023-06-27 PROCEDURE — 96366 THER/PROPH/DIAG IV INF ADDON: CPT

## 2023-06-27 PROCEDURE — 85730 THROMBOPLASTIN TIME PARTIAL: CPT

## 2023-06-27 PROCEDURE — 70450 CT HEAD/BRAIN W/O DYE: CPT

## 2023-06-27 PROCEDURE — 6360000002 HC RX W HCPCS: Performed by: STUDENT IN AN ORGANIZED HEALTH CARE EDUCATION/TRAINING PROGRAM

## 2023-06-27 PROCEDURE — 70498 CT ANGIOGRAPHY NECK: CPT

## 2023-06-27 RX ORDER — MORPHINE SULFATE 4 MG/ML
4 INJECTION, SOLUTION INTRAMUSCULAR; INTRAVENOUS ONCE
Status: DISCONTINUED | OUTPATIENT
Start: 2023-06-27 | End: 2023-06-27

## 2023-06-27 RX ORDER — 0.9 % SODIUM CHLORIDE 0.9 %
1000 INTRAVENOUS SOLUTION INTRAVENOUS ONCE
Status: COMPLETED | OUTPATIENT
Start: 2023-06-27 | End: 2023-06-27

## 2023-06-27 RX ORDER — NICARDIPINE HYDROCHLORIDE 0.1 MG/ML
2.5-15 INJECTION INTRAVENOUS CONTINUOUS
Status: DISCONTINUED | OUTPATIENT
Start: 2023-06-27 | End: 2023-06-28 | Stop reason: HOSPADM

## 2023-06-27 RX ORDER — ONDANSETRON 2 MG/ML
4 INJECTION INTRAMUSCULAR; INTRAVENOUS ONCE
Status: COMPLETED | OUTPATIENT
Start: 2023-06-27 | End: 2023-06-27

## 2023-06-27 RX ORDER — LORAZEPAM 2 MG/ML
0.5 INJECTION INTRAMUSCULAR ONCE
Status: COMPLETED | OUTPATIENT
Start: 2023-06-27 | End: 2023-06-27

## 2023-06-27 RX ADMIN — IOPAMIDOL 75 ML: 755 INJECTION, SOLUTION INTRAVENOUS at 14:45

## 2023-06-27 RX ADMIN — SODIUM CHLORIDE 1000 ML: 9 INJECTION, SOLUTION INTRAVENOUS at 15:45

## 2023-06-27 RX ADMIN — ONDANSETRON 4 MG: 2 INJECTION INTRAMUSCULAR; INTRAVENOUS at 17:48

## 2023-06-27 RX ADMIN — IOPAMIDOL 150 ML: 755 INJECTION, SOLUTION INTRAVENOUS at 23:51

## 2023-06-27 RX ADMIN — SODIUM CHLORIDE 5 MG/HR: 9 INJECTION, SOLUTION INTRAVENOUS at 15:45

## 2023-06-27 RX ADMIN — NICARDIPINE HYDROCHLORIDE 2.5 MG/HR: 0.1 INJECTION INTRAVENOUS at 23:01

## 2023-06-27 RX ADMIN — LORAZEPAM 0.5 MG: 2 INJECTION INTRAMUSCULAR; INTRAVENOUS at 17:45

## 2023-06-27 ASSESSMENT — PAIN DESCRIPTION - FREQUENCY: FREQUENCY: CONTINUOUS

## 2023-06-27 ASSESSMENT — PAIN SCALES - GENERAL
PAINLEVEL_OUTOF10: 0
PAINLEVEL_OUTOF10: 3

## 2023-06-27 ASSESSMENT — PAIN DESCRIPTION - DESCRIPTORS: DESCRIPTORS: DULL;ACHING

## 2023-06-27 ASSESSMENT — PAIN DESCRIPTION - ORIENTATION: ORIENTATION: LEFT;ANTERIOR

## 2023-06-27 ASSESSMENT — PAIN DESCRIPTION - PAIN TYPE: TYPE: ACUTE PAIN

## 2023-06-27 ASSESSMENT — PAIN DESCRIPTION - LOCATION: LOCATION: HEAD

## 2023-06-27 ASSESSMENT — PAIN - FUNCTIONAL ASSESSMENT: PAIN_FUNCTIONAL_ASSESSMENT: 0-10

## 2023-06-28 VITALS
RESPIRATION RATE: 21 BRPM | DIASTOLIC BLOOD PRESSURE: 66 MMHG | BODY MASS INDEX: 29.82 KG/M2 | SYSTOLIC BLOOD PRESSURE: 143 MMHG | HEART RATE: 71 BPM | HEIGHT: 67 IN | OXYGEN SATURATION: 94 % | TEMPERATURE: 98.1 F | WEIGHT: 190 LBS

## 2023-06-28 PROBLEM — I16.0 HYPERTENSIVE URGENCY: Status: ACTIVE | Noted: 2023-06-28

## 2023-06-28 LAB
ALBUMIN SERPL-MCNC: 4 G/DL (ref 3.5–5.2)
ALBUMIN/GLOB SERPL: 1.3 {RATIO} (ref 1–2.5)
ALP SERPL-CCNC: 76 U/L (ref 35–104)
ALT SERPL-CCNC: 12 U/L (ref 5–33)
ANION GAP SERPL CALCULATED.3IONS-SCNC: 13 MMOL/L (ref 9–17)
ANION GAP SERPL CALCULATED.3IONS-SCNC: 14 MMOL/L (ref 9–17)
AST SERPL-CCNC: 20 U/L
BASOPHILS # BLD: 0.03 K/UL (ref 0–0.2)
BASOPHILS NFR BLD: 0 % (ref 0–2)
BILIRUB SERPL-MCNC: 0.8 MG/DL (ref 0.3–1.2)
BUN SERPL-MCNC: 10 MG/DL (ref 8–23)
BUN SERPL-MCNC: 10 MG/DL (ref 8–23)
CALCIUM SERPL-MCNC: 9 MG/DL (ref 8.6–10.4)
CALCIUM SERPL-MCNC: 9.1 MG/DL (ref 8.6–10.4)
CHLORIDE SERPL-SCNC: 90 MMOL/L (ref 98–107)
CHLORIDE SERPL-SCNC: 91 MMOL/L (ref 98–107)
CO2 SERPL-SCNC: 20 MMOL/L (ref 20–31)
CO2 SERPL-SCNC: 22 MMOL/L (ref 20–31)
CREAT SERPL-MCNC: 0.45 MG/DL (ref 0.5–0.9)
CREAT SERPL-MCNC: 0.64 MG/DL (ref 0.5–0.9)
EKG ATRIAL RATE: 69 BPM
EKG P AXIS: 20 DEGREES
EKG P-R INTERVAL: 286 MS
EKG Q-T INTERVAL: 432 MS
EKG QRS DURATION: 88 MS
EKG QTC CALCULATION (BAZETT): 462 MS
EKG R AXIS: -41 DEGREES
EKG T AXIS: 20 DEGREES
EKG VENTRICULAR RATE: 69 BPM
EOSINOPHIL # BLD: 0.4 K/UL (ref 0–0.44)
EOSINOPHILS RELATIVE PERCENT: 5 % (ref 1–4)
ERYTHROCYTE [DISTWIDTH] IN BLOOD BY AUTOMATED COUNT: 12 % (ref 11.8–14.4)
GFR SERPL CREATININE-BSD FRML MDRD: >60 ML/MIN/1.73M2
GFR SERPL CREATININE-BSD FRML MDRD: >60 ML/MIN/1.73M2
GLUCOSE SERPL-MCNC: 133 MG/DL (ref 70–99)
GLUCOSE SERPL-MCNC: 162 MG/DL (ref 70–99)
HCT VFR BLD AUTO: 40 % (ref 36.3–47.1)
HGB BLD-MCNC: 13.9 G/DL (ref 11.9–15.1)
IMM GRANULOCYTES # BLD AUTO: 0.04 K/UL (ref 0–0.3)
IMM GRANULOCYTES NFR BLD: 1 %
LYMPHOCYTES # BLD: 18 % (ref 24–43)
LYMPHOCYTES NFR BLD: 1.52 K/UL (ref 1.1–3.7)
MCH RBC QN AUTO: 31.3 PG (ref 25.2–33.5)
MCHC RBC AUTO-ENTMCNC: 34.8 G/DL (ref 28.4–34.8)
MCV RBC AUTO: 90.1 FL (ref 82.6–102.9)
MONOCYTES NFR BLD: 1.06 K/UL (ref 0.1–1.2)
MONOCYTES NFR BLD: 13 % (ref 3–12)
NEUTROPHILS NFR BLD: 63 % (ref 36–65)
NEUTS SEG NFR BLD: 5.44 K/UL (ref 1.5–8.1)
NRBC BLD-RTO: 0 PER 100 WBC
PLATELET # BLD AUTO: 240 K/UL (ref 138–453)
PMV BLD AUTO: 9.6 FL (ref 8.1–13.5)
POTASSIUM SERPL-SCNC: 3.6 MMOL/L (ref 3.7–5.3)
POTASSIUM SERPL-SCNC: 3.7 MMOL/L (ref 3.7–5.3)
PROT SERPL-MCNC: 7.1 G/DL (ref 6.4–8.3)
RBC # BLD AUTO: 4.44 M/UL (ref 3.95–5.11)
SODIUM SERPL-SCNC: 125 MMOL/L (ref 135–144)
SODIUM SERPL-SCNC: 125 MMOL/L (ref 135–144)
WBC OTHER # BLD: 8.5 K/UL (ref 3.5–11.3)

## 2023-06-28 PROCEDURE — 6370000000 HC RX 637 (ALT 250 FOR IP): Performed by: NURSE PRACTITIONER

## 2023-06-28 PROCEDURE — 2580000003 HC RX 258: Performed by: NURSE PRACTITIONER

## 2023-06-28 PROCEDURE — 36415 COLL VENOUS BLD VENIPUNCTURE: CPT

## 2023-06-28 PROCEDURE — 6360000002 HC RX W HCPCS: Performed by: NURSE PRACTITIONER

## 2023-06-28 PROCEDURE — 99222 1ST HOSP IP/OBS MODERATE 55: CPT | Performed by: STUDENT IN AN ORGANIZED HEALTH CARE EDUCATION/TRAINING PROGRAM

## 2023-06-28 PROCEDURE — 6370000000 HC RX 637 (ALT 250 FOR IP): Performed by: STUDENT IN AN ORGANIZED HEALTH CARE EDUCATION/TRAINING PROGRAM

## 2023-06-28 PROCEDURE — 2060000000 HC ICU INTERMEDIATE R&B

## 2023-06-28 PROCEDURE — 2580000003 HC RX 258: Performed by: STUDENT IN AN ORGANIZED HEALTH CARE EDUCATION/TRAINING PROGRAM

## 2023-06-28 PROCEDURE — 80048 BASIC METABOLIC PNL TOTAL CA: CPT

## 2023-06-28 RX ORDER — LOSARTAN POTASSIUM 50 MG/1
100 TABLET ORAL DAILY
Status: DISCONTINUED | OUTPATIENT
Start: 2023-06-28 | End: 2023-06-28

## 2023-06-28 RX ORDER — CARVEDILOL 6.25 MG/1
6.25 TABLET ORAL 2 TIMES DAILY
Status: DISCONTINUED | OUTPATIENT
Start: 2023-06-28 | End: 2023-06-28 | Stop reason: HOSPADM

## 2023-06-28 RX ORDER — SODIUM CHLORIDE 9 MG/ML
INJECTION, SOLUTION INTRAVENOUS CONTINUOUS
Status: DISCONTINUED | OUTPATIENT
Start: 2023-06-28 | End: 2023-06-28 | Stop reason: HOSPADM

## 2023-06-28 RX ORDER — POTASSIUM CHLORIDE 20 MEQ/1
10 TABLET, EXTENDED RELEASE ORAL 2 TIMES DAILY
Status: DISCONTINUED | OUTPATIENT
Start: 2023-06-28 | End: 2023-06-28

## 2023-06-28 RX ORDER — ASPIRIN 81 MG/1
81 TABLET ORAL DAILY
Status: DISCONTINUED | OUTPATIENT
Start: 2023-06-28 | End: 2023-06-28 | Stop reason: HOSPADM

## 2023-06-28 RX ORDER — ONDANSETRON 2 MG/ML
4 INJECTION INTRAMUSCULAR; INTRAVENOUS EVERY 6 HOURS PRN
Status: DISCONTINUED | OUTPATIENT
Start: 2023-06-28 | End: 2023-06-28 | Stop reason: HOSPADM

## 2023-06-28 RX ORDER — POTASSIUM CHLORIDE 750 MG/1
10 TABLET, EXTENDED RELEASE ORAL DAILY
Qty: 7 TABLET | Refills: 0 | Status: SHIPPED | OUTPATIENT
Start: 2023-06-28 | End: 2023-07-05

## 2023-06-28 RX ORDER — ATORVASTATIN CALCIUM 20 MG/1
20 TABLET, FILM COATED ORAL DAILY
Status: DISCONTINUED | OUTPATIENT
Start: 2023-06-28 | End: 2023-06-28 | Stop reason: HOSPADM

## 2023-06-28 RX ORDER — LOSARTAN POTASSIUM 25 MG/1
25 TABLET ORAL DAILY
Status: DISCONTINUED | OUTPATIENT
Start: 2023-06-28 | End: 2023-06-28 | Stop reason: HOSPADM

## 2023-06-28 RX ORDER — PANTOPRAZOLE SODIUM 40 MG/1
40 TABLET, DELAYED RELEASE ORAL
Status: DISCONTINUED | OUTPATIENT
Start: 2023-06-28 | End: 2023-06-28 | Stop reason: HOSPADM

## 2023-06-28 RX ORDER — ACETAMINOPHEN 325 MG/1
650 TABLET ORAL EVERY 6 HOURS PRN
Status: DISCONTINUED | OUTPATIENT
Start: 2023-06-28 | End: 2023-06-28 | Stop reason: HOSPADM

## 2023-06-28 RX ORDER — POLYETHYLENE GLYCOL 3350 17 G/17G
17 POWDER, FOR SOLUTION ORAL DAILY PRN
Status: DISCONTINUED | OUTPATIENT
Start: 2023-06-28 | End: 2023-06-28 | Stop reason: HOSPADM

## 2023-06-28 RX ORDER — ENOXAPARIN SODIUM 100 MG/ML
40 INJECTION SUBCUTANEOUS DAILY
Status: DISCONTINUED | OUTPATIENT
Start: 2023-06-28 | End: 2023-06-28 | Stop reason: HOSPADM

## 2023-06-28 RX ORDER — SODIUM CHLORIDE 9 MG/ML
INJECTION, SOLUTION INTRAVENOUS PRN
Status: DISCONTINUED | OUTPATIENT
Start: 2023-06-28 | End: 2023-06-28 | Stop reason: HOSPADM

## 2023-06-28 RX ORDER — ONDANSETRON 4 MG/1
4 TABLET, ORALLY DISINTEGRATING ORAL EVERY 8 HOURS PRN
Status: DISCONTINUED | OUTPATIENT
Start: 2023-06-28 | End: 2023-06-28 | Stop reason: HOSPADM

## 2023-06-28 RX ORDER — ACETAMINOPHEN 650 MG/1
650 SUPPOSITORY RECTAL EVERY 6 HOURS PRN
Status: DISCONTINUED | OUTPATIENT
Start: 2023-06-28 | End: 2023-06-28 | Stop reason: HOSPADM

## 2023-06-28 RX ORDER — CARVEDILOL 25 MG/1
25 TABLET ORAL 2 TIMES DAILY
Status: DISCONTINUED | OUTPATIENT
Start: 2023-06-28 | End: 2023-06-28

## 2023-06-28 RX ORDER — SODIUM CHLORIDE 0.9 % (FLUSH) 0.9 %
5-40 SYRINGE (ML) INJECTION PRN
Status: DISCONTINUED | OUTPATIENT
Start: 2023-06-28 | End: 2023-06-28 | Stop reason: HOSPADM

## 2023-06-28 RX ORDER — SODIUM CHLORIDE 0.9 % (FLUSH) 0.9 %
5-40 SYRINGE (ML) INJECTION EVERY 12 HOURS SCHEDULED
Status: DISCONTINUED | OUTPATIENT
Start: 2023-06-28 | End: 2023-06-28 | Stop reason: HOSPADM

## 2023-06-28 RX ORDER — ESCITALOPRAM OXALATE 10 MG/1
10 TABLET ORAL DAILY
Status: DISCONTINUED | OUTPATIENT
Start: 2023-06-28 | End: 2023-06-28 | Stop reason: HOSPADM

## 2023-06-28 RX ORDER — HYDROCHLOROTHIAZIDE 25 MG/1
25 TABLET ORAL DAILY
Status: DISCONTINUED | OUTPATIENT
Start: 2023-06-28 | End: 2023-06-28

## 2023-06-28 RX ADMIN — ESCITALOPRAM OXALATE 10 MG: 10 TABLET ORAL at 08:23

## 2023-06-28 RX ADMIN — Medication 81 MG: at 08:23

## 2023-06-28 RX ADMIN — SODIUM CHLORIDE: 9 INJECTION, SOLUTION INTRAVENOUS at 08:35

## 2023-06-28 RX ADMIN — LOSARTAN POTASSIUM 25 MG: 50 TABLET, FILM COATED ORAL at 08:22

## 2023-06-28 RX ADMIN — ENOXAPARIN SODIUM 40 MG: 40 INJECTION SUBCUTANEOUS at 08:24

## 2023-06-28 RX ADMIN — CARVEDILOL 6.25 MG: 25 TABLET, FILM COATED ORAL at 08:23

## 2023-06-28 RX ADMIN — SODIUM CHLORIDE, PRESERVATIVE FREE 10 ML: 5 INJECTION INTRAVENOUS at 08:33

## 2023-06-28 RX ADMIN — PANTOPRAZOLE SODIUM 40 MG: 40 TABLET, DELAYED RELEASE ORAL at 08:23

## 2023-06-28 RX ADMIN — ATORVASTATIN CALCIUM 20 MG: 20 TABLET, FILM COATED ORAL at 08:23

## 2023-06-28 RX ADMIN — ONDANSETRON 4 MG: 2 INJECTION INTRAMUSCULAR; INTRAVENOUS at 06:23

## 2023-06-28 ASSESSMENT — PAIN SCALES - GENERAL
PAINLEVEL_OUTOF10: 0

## 2023-06-28 ASSESSMENT — ENCOUNTER SYMPTOMS
DIARRHEA: 0
FACIAL SWELLING: 0
APNEA: 0
ABDOMINAL PAIN: 0
CONSTIPATION: 0
EYE ITCHING: 0
COLOR CHANGE: 0
BACK PAIN: 0
EYE DISCHARGE: 0
ABDOMINAL DISTENTION: 0
CHEST TIGHTNESS: 0

## 2023-06-29 ENCOUNTER — CARE COORDINATION (OUTPATIENT)
Dept: CASE MANAGEMENT | Age: 88
End: 2023-06-29

## 2023-06-30 ENCOUNTER — CARE COORDINATION (OUTPATIENT)
Dept: CASE MANAGEMENT | Age: 88
End: 2023-06-30

## 2023-06-30 DIAGNOSIS — I16.0 HYPERTENSIVE URGENCY: Primary | ICD-10-CM

## 2023-06-30 PROCEDURE — 1111F DSCHRG MED/CURRENT MED MERGE: CPT | Performed by: INTERNAL MEDICINE

## 2023-07-07 ENCOUNTER — CARE COORDINATION (OUTPATIENT)
Dept: CASE MANAGEMENT | Age: 88
End: 2023-07-07

## 2023-07-07 PROBLEM — I67.1 RIGHT INTERNAL CAROTID ARTERY ANEURYSM: Status: ACTIVE | Noted: 2023-07-07

## 2023-07-07 NOTE — CARE COORDINATION
[address] [address] [facility] 250 Care Transitions Follow Up Call    Patient Current Location:  Home: [address]  [address] Coordinator contacted the patient by telephone to follow up after admission on 2023. Verified name and  with patient as identifiers. Patient: Elvis Mullins  Patient : 1933   MRN: [id number]  Reason for Admission: Hypertensive urgency,  Discharge Date: 23 RARS: Readmission Risk Score: 9.8      Needs to be reviewed by the provider   Additional needs identified to be addressed with provider: No  none             Method of communication with provider: none. Writer spoke with Dylan Masters for follow up care transitions call. She reports she is feeling good. She is monitoring her BP daily. This morning's reading was 147/76. Denies headache or dizziness. Patient has PCP appointment scheduled for 2023, reminded to complete BMP prior to appointment. Addressed changes since last contact:  none  Discussed follow-up appointments. If no appointment was previously scheduled, appointment scheduling offered: Yes. Is follow up appointment scheduled within 7 days of discharge? No.    Follow Up  Future Appointments   Date Time Provider [address]   2023  2:40 MD Beni Gillespie   2023  2:20 PM MD Beni Huddleston   2024  2:00 PM MD Beni Huddleston     Non-[facility] follow up appointment(s): N/A    LPN Care Coordinator reviewed discharge instructions with patient and discussed any barriers to care and/or understanding of plan of care after discharge. Discussed appropriate site of care based on symptoms and resources available to patient including: PCP  Urgent care clinics. The patient agrees to contact the PCP office for questions related to their healthcare. Advance Care Planning:   reviewed and current.      Patients top risk factors for readmission: Hypertensive

## 2023-07-11 ENCOUNTER — HOSPITAL ENCOUNTER (OUTPATIENT)
Age: 88
Discharge: HOME | End: 2023-07-11
Payer: MEDICARE

## 2023-07-11 DIAGNOSIS — I10 ESSENTIAL HYPERTENSION: ICD-10-CM

## 2023-07-11 LAB
ANION GAP SERPL CALCULATED.3IONS-SCNC: 10 MMOL/L (ref 9–17)
BUN SERPL-MCNC: 14 MG/DL (ref 8–23)
BUN/CREAT SERPL: 23 (ref 9–20)
CALCIUM SERPL-MCNC: 9.2 MG/DL (ref 8.6–10.4)
CHLORIDE SERPL-SCNC: 92 MMOL/L (ref 98–107)
CO2 SERPL-SCNC: 24 MMOL/L (ref 20–31)
CREAT SERPL-MCNC: 0.6 MG/DL (ref 0.5–0.9)
GFR SERPL CREATININE-BSD FRML MDRD: >60 ML/MIN/1.73M2
GLUCOSE SERPL-MCNC: 131 MG/DL (ref 70–99)
POTASSIUM SERPL-SCNC: 4.2 MMOL/L (ref 3.7–5.3)
SODIUM SERPL-SCNC: 126 MMOL/L (ref 135–144)

## 2023-07-11 PROCEDURE — 80048 BASIC METABOLIC PNL TOTAL CA: CPT

## 2023-07-11 PROCEDURE — 36415 COLL VENOUS BLD VENIPUNCTURE: CPT

## 2023-07-14 ENCOUNTER — CARE COORDINATION (OUTPATIENT)
Dept: CASE MANAGEMENT | Age: 88
End: 2023-07-14

## 2023-07-14 NOTE — CARE COORDINATION
Care Transitions Outreach Attempt # 1    Attempted to reach patient for transitions of care follow up. Unable to reach patient. Left detailed VM with reason for call & contact information. Will attempt outreach another day. Patient: Ricardo Diaz Patient : 1933 MRN: [id number]    Last Discharge [address]       Date Complaint Diagnosis Description Type Department Provider    23 Headache Right internal carotid artery aneurysm . .. ED to Hosp-Admission (Discharged) (ADMITTED) [facility] 1C Aishwarya Bear MD; Andrew Bauman I... 23 Headache Acute nonintractable headache, unspecified headache type . .. ED (TRANSFER) Kayli Mcmahon MD              Was this an external facility discharge?  No Discharge Facility: [facility]    Noted following upcoming appointments from discharge chart review:   [facility] follow up appointment(s):   Future Appointments   Date Time Provider [address]   2023  2:30 PM MD Beni Dan   2023  2:20 MD Beni Duenas   2024  2:00 PM Jenel Peacemaker, MD [facility]

## 2023-07-17 ENCOUNTER — HOSPITAL ENCOUNTER (OUTPATIENT)
Age: 88
Discharge: HOME | End: 2023-07-17
Payer: MEDICARE

## 2023-07-17 DIAGNOSIS — E87.1 LOW SODIUM LEVELS: ICD-10-CM

## 2023-07-17 DIAGNOSIS — E11.9 TYPE 2 DIABETES MELLITUS WITHOUT COMPLICATION, WITHOUT LONG-TERM CURRENT USE OF INSULIN (HCC): ICD-10-CM

## 2023-07-17 DIAGNOSIS — I16.0 HYPERTENSIVE URGENCY: ICD-10-CM

## 2023-07-17 LAB
ANION GAP SERPL CALCULATED.3IONS-SCNC: 9 MMOL/L (ref 9–17)
BUN SERPL-MCNC: 19 MG/DL (ref 8–23)
BUN/CREAT SERPL: 32 (ref 9–20)
CALCIUM SERPL-MCNC: 9.3 MG/DL (ref 8.6–10.4)
CHLORIDE SERPL-SCNC: 94 MMOL/L (ref 98–107)
CO2 SERPL-SCNC: 26 MMOL/L (ref 20–31)
CREAT SERPL-MCNC: 0.6 MG/DL (ref 0.5–0.9)
GFR SERPL CREATININE-BSD FRML MDRD: >60 ML/MIN/1.73M2
GLUCOSE SERPL-MCNC: 111 MG/DL (ref 70–99)
POTASSIUM SERPL-SCNC: 4.3 MMOL/L (ref 3.7–5.3)
SODIUM SERPL-SCNC: 129 MMOL/L (ref 135–144)

## 2023-07-17 PROCEDURE — 36415 COLL VENOUS BLD VENIPUNCTURE: CPT

## 2023-07-17 PROCEDURE — 80048 BASIC METABOLIC PNL TOTAL CA: CPT

## 2023-07-18 ENCOUNTER — CARE COORDINATION (OUTPATIENT)
Dept: CASE MANAGEMENT | Age: 88
End: 2023-07-18

## 2023-07-18 NOTE — CARE COORDINATION
[facility] Care Transitions Follow Up Call    Patient Current Location:  Home: [address]  [address] [place] [id number] [place] [address] Coordinator contacted the patient by telephone to follow up after admission on 2023. Verified name and  with patient as identifiers. Patient: Elvis Mullins  Patient : 1933   MRN: [id number]  Reason for Admission: Hypertensive urgency  Discharge Date: 23 RARS: Readmission Risk Score: 9.8      Needs to be reviewed by the provider   Additional needs identified to be addressed with provider: No  none             Method of communication with provider: none      Writer spoke with Dlyan Masters for a follow up care transitions call. She reports she is feeling good. Her BP is still running high int the 150's-160's/mid to high 80's. She has a follow up tomorrow to follow up on her BP. Denies CP,palpitations,dizziness or sob. Appetite and bowel/bladder are normal.     Addressed changes since last contact:   had hospital follow up with PCP-no changes made. Discussed follow-up appointments. If no appointment was previously scheduled, appointment scheduling offered: Yes. Is follow up appointment scheduled within 7 days of discharge? No.    Follow Up  Future Appointments   Date Time Provider [address] [place] [address]   2023  2:30 PM MD Beni Huddleston   2023  2:20 PM MD Beni Huddleston   2024  2:00 PM MD Beni Huddleston     Non-[facility] follow up appointment(s): N/A    LPN Care Coordinator reviewed discharge instructions with patient and discussed any barriers to care and/or understanding of plan of care after discharge. Discussed appropriate site of care based on symptoms and resources available to patient including: PCP  Urgent care clinics. The patient agrees to contact the PCP office for questions related to their healthcare. Advance Care Planning:   reviewed and current.      Patients top risk

## 2023-07-27 ENCOUNTER — CARE COORDINATION (OUTPATIENT)
Dept: CASE MANAGEMENT | Age: 88
End: 2023-07-27

## 2023-07-27 NOTE — CARE COORDINATION
[address] [address] [facility] 250 Care Transitions Follow Up Sub Call#1 -Attempted to reach patient for subsequent transitional call #1   No voicemail option  Patient Current Location:   not reached    Patient: Noam Schultz    Patient : 1933   MRN: [id number]    Reason for Admission: Hypertensive Urgency  Discharge Date: 23   RARS: Readmission Risk Score: 9.8        Follow Up  Future Appointments   Date Time Provider [address]  [address]   2023  3:00 PM MD Beni Nassar   2023  2:20 PM MD Beni Nassar   2024  2:00 PM MD Beni Nassar   .        Plan for next call: symptom management-check for any symptoms, check BP readings since clonidine patch was started  follow-up appointment-review PCP appt from   medication management-check tolerance of new clonidine patch  Plan to refer to Eldon Rothman RN

## 2023-07-28 ENCOUNTER — CARE COORDINATION (OUTPATIENT)
Dept: CASE MANAGEMENT | Age: 88
End: 2023-07-28

## 2023-08-01 ENCOUNTER — CARE COORDINATION (OUTPATIENT)
Dept: CARE COORDINATION | Age: 88
End: 2023-08-01

## 2023-08-01 NOTE — CARE COORDINATION
ACC: Lee Ann Almendarez, RN    CC outreach call placed to patient per CTN referral. Iglesia Dubon to reach [place]. Left a voicemail message requesting a return phone call back to this AC when patient is able to [phone], office hours given.      -2nd unsuccessful outreach by CTN   -1st unsuccessful outreach by [employer]       Future Appointments   Date Time Provider 4600  [address]   8/2/2023  3:00 PM MD Beni Del Rosario    9/25/2023  2:20 MD Beni German    4/8/2024  2:00 PM MD Beni Del Rosario

## 2024-05-13 ENCOUNTER — HOSPITAL ENCOUNTER (OUTPATIENT)
Age: 89
Discharge: HOME | End: 2024-05-13
Payer: MEDICARE

## 2024-05-13 DIAGNOSIS — I10 ESSENTIAL HYPERTENSION: ICD-10-CM

## 2024-05-13 LAB
ANION GAP SERPL CALCULATED.3IONS-SCNC: 10 MMOL/L (ref 9–17)
BUN SERPL-MCNC: 19 MG/DL (ref 8–23)
BUN/CREAT SERPL: 24 (ref 9–20)
CALCIUM SERPL-MCNC: 9.2 MG/DL (ref 8.6–10.4)
CHLORIDE SERPL-SCNC: 96 MMOL/L (ref 98–107)
CO2 SERPL-SCNC: 26 MMOL/L (ref 20–31)
CREAT SERPL-MCNC: 0.8 MG/DL (ref 0.5–0.9)
GFR, ESTIMATED: 70 ML/MIN/1.73M2
GLUCOSE P FAST SERPL-MCNC: 117 MG/DL (ref 70–99)
POTASSIUM SERPL-SCNC: 4.5 MMOL/L (ref 3.7–5.3)
SODIUM SERPL-SCNC: 132 MMOL/L (ref 135–144)

## 2024-05-13 PROCEDURE — 80048 BASIC METABOLIC PNL TOTAL CA: CPT

## 2024-05-13 PROCEDURE — 36415 COLL VENOUS BLD VENIPUNCTURE: CPT

## 2024-09-17 ENCOUNTER — HOSPITAL ENCOUNTER (OUTPATIENT)
Age: 89
Discharge: HOME | End: 2024-09-17
Payer: MEDICARE

## 2024-09-17 ENCOUNTER — HOSPITAL ENCOUNTER (OUTPATIENT)
Dept: GENERAL RADIOLOGY | Age: 89
Discharge: HOME | End: 2024-09-19
Payer: MEDICARE

## 2024-09-17 ENCOUNTER — HOSPITAL ENCOUNTER (OUTPATIENT)
Age: 89
Discharge: HOME | End: 2024-09-19
Payer: MEDICARE

## 2024-09-17 DIAGNOSIS — R14.0 ABDOMINAL BLOATING: ICD-10-CM

## 2024-09-17 DIAGNOSIS — R53.81 MALAISE AND FATIGUE: ICD-10-CM

## 2024-09-17 DIAGNOSIS — R53.83 MALAISE AND FATIGUE: ICD-10-CM

## 2024-09-17 LAB
ALBUMIN SERPL-MCNC: 3.7 G/DL (ref 3.5–5.2)
ALBUMIN/GLOB SERPL: 1.2 {RATIO} (ref 1–2.5)
ALP SERPL-CCNC: 105 U/L (ref 35–104)
ALT SERPL-CCNC: 7 U/L (ref 10–35)
ANION GAP SERPL CALCULATED.3IONS-SCNC: 11 MMOL/L (ref 9–16)
AST SERPL-CCNC: 17 U/L (ref 10–35)
BACTERIA URNS QL MICRO: ABNORMAL
BASOPHILS # BLD: 0.03 K/UL (ref 0–0.2)
BASOPHILS NFR BLD: 1 % (ref 0–2)
BILIRUB SERPL-MCNC: 0.6 MG/DL (ref 0–1.2)
BILIRUB UR QL STRIP: NEGATIVE
BUN SERPL-MCNC: 11 MG/DL (ref 8–23)
BUN/CREAT SERPL: 18 (ref 9–20)
CALCIUM SERPL-MCNC: 9.4 MG/DL (ref 8.6–10.4)
CHLORIDE SERPL-SCNC: 92 MMOL/L (ref 98–107)
CLARITY UR: CLEAR
CO2 SERPL-SCNC: 23 MMOL/L (ref 20–31)
COLOR UR: YELLOW
CREAT SERPL-MCNC: 0.6 MG/DL (ref 0.5–0.9)
EOSINOPHIL # BLD: 0.38 K/UL (ref 0–0.44)
EOSINOPHILS RELATIVE PERCENT: 6 % (ref 1–4)
EPI CELLS #/AREA URNS HPF: ABNORMAL /HPF (ref 0–25)
ERYTHROCYTE [DISTWIDTH] IN BLOOD BY AUTOMATED COUNT: 12.1 % (ref 11.8–14.4)
GFR, ESTIMATED: 85 ML/MIN/1.73M2
GLUCOSE P FAST SERPL-MCNC: 117 MG/DL (ref 74–99)
GLUCOSE UR STRIP-MCNC: NEGATIVE MG/DL
HCT VFR BLD AUTO: 36.5 % (ref 36.3–47.1)
HGB BLD-MCNC: 12.2 G/DL (ref 11.9–15.1)
HGB UR QL STRIP.AUTO: NEGATIVE
IMM GRANULOCYTES # BLD AUTO: <0.03 K/UL (ref 0–0.3)
IMM GRANULOCYTES NFR BLD: 0 %
KETONES UR STRIP-MCNC: NEGATIVE MG/DL
LEUKOCYTE ESTERASE UR QL STRIP: NEGATIVE
LYMPHOCYTES NFR BLD: 1.17 K/UL (ref 1.1–3.7)
LYMPHOCYTES RELATIVE PERCENT: 18 % (ref 24–43)
MCH RBC QN AUTO: 30.3 PG (ref 25.2–33.5)
MCHC RBC AUTO-ENTMCNC: 33.4 G/DL (ref 28.4–34.8)
MCV RBC AUTO: 90.8 FL (ref 82.6–102.9)
MONOCYTES NFR BLD: 0.93 K/UL (ref 0.1–1.2)
MONOCYTES NFR BLD: 15 % (ref 3–12)
NEUTROPHILS NFR BLD: 60 % (ref 36–65)
NEUTS SEG NFR BLD: 3.83 K/UL (ref 1.5–8.1)
NITRITE UR QL STRIP: NEGATIVE
NRBC BLD-RTO: 0 PER 100 WBC
PH UR STRIP: 7 [PH] (ref 5–9)
PLATELET # BLD AUTO: 311 K/UL (ref 138–453)
PMV BLD AUTO: 9.5 FL (ref 8.1–13.5)
POTASSIUM SERPL-SCNC: 4.6 MMOL/L (ref 3.7–5.3)
PROT SERPL-MCNC: 6.9 G/DL (ref 6.6–8.7)
PROT UR STRIP-MCNC: NEGATIVE MG/DL
RBC # BLD AUTO: 4.02 M/UL (ref 3.95–5.11)
RBC #/AREA URNS HPF: ABNORMAL /HPF (ref 0–2)
SODIUM SERPL-SCNC: 126 MMOL/L (ref 136–145)
SP GR UR STRIP: 1.01 (ref 1.01–1.02)
UROBILINOGEN UR STRIP-ACNC: NORMAL EU/DL (ref 0–1)
WBC #/AREA URNS HPF: ABNORMAL /HPF (ref 0–5)
WBC OTHER # BLD: 6.4 K/UL (ref 3.5–11.3)

## 2024-09-17 PROCEDURE — 74019 RADEX ABDOMEN 2 VIEWS: CPT

## 2024-09-17 PROCEDURE — 80053 COMPREHEN METABOLIC PANEL: CPT

## 2024-09-17 PROCEDURE — 81001 URINALYSIS AUTO W/SCOPE: CPT

## 2024-09-17 PROCEDURE — 85025 COMPLETE CBC W/AUTO DIFF WBC: CPT

## 2024-09-17 PROCEDURE — 36415 COLL VENOUS BLD VENIPUNCTURE: CPT

## 2024-09-27 ENCOUNTER — HOSPITAL ENCOUNTER (OUTPATIENT)
Age: 89
Discharge: HOME | End: 2024-09-27
Payer: MEDICARE

## 2024-09-27 DIAGNOSIS — E87.1 LOW SODIUM LEVELS: ICD-10-CM

## 2024-09-27 LAB
ALBUMIN SERPL-MCNC: 3.7 G/DL (ref 3.5–5.2)
ALBUMIN/GLOB SERPL: 1.4 {RATIO} (ref 1–2.5)
ALP SERPL-CCNC: 93 U/L (ref 35–104)
ALT SERPL-CCNC: 7 U/L (ref 10–35)
ANION GAP SERPL CALCULATED.3IONS-SCNC: 7 MMOL/L (ref 9–16)
AST SERPL-CCNC: 16 U/L (ref 10–35)
BILIRUB SERPL-MCNC: 0.5 MG/DL (ref 0–1.2)
BUN SERPL-MCNC: 10 MG/DL (ref 8–23)
BUN/CREAT SERPL: 14 (ref 9–20)
CALCIUM SERPL-MCNC: 9 MG/DL (ref 8.6–10.4)
CHLORIDE SERPL-SCNC: 94 MMOL/L (ref 98–107)
CO2 SERPL-SCNC: 25 MMOL/L (ref 20–31)
CREAT SERPL-MCNC: 0.7 MG/DL (ref 0.5–0.9)
GFR, ESTIMATED: 83 ML/MIN/1.73M2
GLUCOSE SERPL-MCNC: 109 MG/DL (ref 74–99)
POTASSIUM SERPL-SCNC: 4.4 MMOL/L (ref 3.7–5.3)
PROT SERPL-MCNC: 6.4 G/DL (ref 6.6–8.7)
SODIUM SERPL-SCNC: 126 MMOL/L (ref 136–145)

## 2024-09-27 PROCEDURE — 80053 COMPREHEN METABOLIC PANEL: CPT

## 2024-09-27 PROCEDURE — 36415 COLL VENOUS BLD VENIPUNCTURE: CPT

## 2024-10-28 ENCOUNTER — HOSPITAL ENCOUNTER (OUTPATIENT)
Age: 89
Setting detail: SPECIMEN
Discharge: HOME | End: 2024-10-28

## 2024-10-28 DIAGNOSIS — I10 ESSENTIAL HYPERTENSION: ICD-10-CM

## 2024-10-28 PROBLEM — M25.562 LEFT LATERAL KNEE PAIN: Status: RESOLVED | Noted: 2022-03-21 | Resolved: 2024-10-28

## 2024-10-28 PROBLEM — R26.9 ABNORMAL GAIT DUE TO MUSCLE WEAKNESS: Status: RESOLVED | Noted: 2021-04-12 | Resolved: 2024-10-28

## 2024-10-28 PROBLEM — M62.81 ABNORMAL GAIT DUE TO MUSCLE WEAKNESS: Status: RESOLVED | Noted: 2021-04-12 | Resolved: 2024-10-28

## 2024-10-28 PROBLEM — I16.0 HYPERTENSIVE URGENCY: Status: RESOLVED | Noted: 2023-06-28 | Resolved: 2024-10-28

## 2024-10-28 LAB
ANION GAP SERPL CALCULATED.3IONS-SCNC: 9 MMOL/L (ref 9–16)
BUN SERPL-MCNC: 16 MG/DL (ref 8–23)
CALCIUM SERPL-MCNC: 9.5 MG/DL (ref 8.6–10.4)
CHLORIDE SERPL-SCNC: 90 MMOL/L (ref 98–107)
CO2 SERPL-SCNC: 27 MMOL/L (ref 20–31)
CREAT SERPL-MCNC: 0.8 MG/DL (ref 0.5–0.9)
GFR, ESTIMATED: 72 ML/MIN/1.73M2
GLUCOSE SERPL-MCNC: 96 MG/DL (ref 74–99)
POTASSIUM SERPL-SCNC: 5.1 MMOL/L (ref 3.7–5.3)
SODIUM SERPL-SCNC: 126 MMOL/L (ref 136–145)

## 2024-11-07 ENCOUNTER — APPOINTMENT (OUTPATIENT)
Dept: CT IMAGING | Age: 89
DRG: 291 | End: 2024-11-07
Payer: MEDICARE

## 2024-11-07 ENCOUNTER — HOSPITAL ENCOUNTER (INPATIENT)
Age: 89
LOS: 2 days | Discharge: HOME | DRG: 291 | End: 2024-11-09
Admitting: INTERNAL MEDICINE
Payer: MEDICARE

## 2024-11-07 ENCOUNTER — APPOINTMENT (OUTPATIENT)
Dept: GENERAL RADIOLOGY | Age: 89
DRG: 291 | End: 2024-11-07
Payer: MEDICARE

## 2024-11-07 DIAGNOSIS — J96.01 ACUTE RESPIRATORY FAILURE WITH HYPOXIA: ICD-10-CM

## 2024-11-07 DIAGNOSIS — R06.02 SHORTNESS OF BREATH: ICD-10-CM

## 2024-11-07 DIAGNOSIS — E87.1 HYPONATREMIA: ICD-10-CM

## 2024-11-07 DIAGNOSIS — I50.9 ACUTE ON CHRONIC CONGESTIVE HEART FAILURE, UNSPECIFIED HEART FAILURE TYPE (HCC): Primary | ICD-10-CM

## 2024-11-07 DIAGNOSIS — J06.9 ACUTE UPPER RESPIRATORY INFECTION: ICD-10-CM

## 2024-11-07 DIAGNOSIS — I50.33 ACUTE ON CHRONIC DIASTOLIC CHF (CONGESTIVE HEART FAILURE) (HCC): ICD-10-CM

## 2024-11-07 LAB
ALBUMIN SERPL-MCNC: 4.1 G/DL (ref 3.5–5.2)
ALBUMIN/GLOB SERPL: 1.3 {RATIO} (ref 1–2.5)
ALP SERPL-CCNC: 106 U/L (ref 35–104)
ALT SERPL-CCNC: 9 U/L (ref 10–35)
ANION GAP SERPL CALCULATED.3IONS-SCNC: 11 MMOL/L (ref 9–16)
AST SERPL-CCNC: 19 U/L (ref 10–35)
BACTERIA URNS QL MICRO: ABNORMAL
BASOPHILS # BLD: <0.03 K/UL (ref 0–0.2)
BASOPHILS NFR BLD: 0 % (ref 0–2)
BILIRUB SERPL-MCNC: 0.9 MG/DL (ref 0–1.2)
BILIRUB UR QL STRIP: NEGATIVE
BNP SERPL-MCNC: 1722 PG/ML (ref 0–450)
BUN SERPL-MCNC: 13 MG/DL (ref 8–23)
BUN/CREAT SERPL: 22 (ref 9–20)
CALCIUM SERPL-MCNC: 9.1 MG/DL (ref 8.6–10.4)
CHLORIDE SERPL-SCNC: 92 MMOL/L (ref 98–107)
CLARITY UR: CLEAR
CO2 SERPL-SCNC: 24 MMOL/L (ref 20–31)
COLOR UR: YELLOW
CREAT SERPL-MCNC: 0.6 MG/DL (ref 0.5–0.9)
CRP SERPL HS-MCNC: 14.1 MG/L (ref 0–5)
EOSINOPHIL # BLD: 0.04 K/UL (ref 0–0.44)
EOSINOPHILS RELATIVE PERCENT: 1 % (ref 1–4)
EPI CELLS #/AREA URNS HPF: ABNORMAL /HPF (ref 0–25)
ERYTHROCYTE [DISTWIDTH] IN BLOOD BY AUTOMATED COUNT: 13.1 % (ref 11.8–14.4)
FLUAV AG SPEC QL: NEGATIVE
FLUBV AG SPEC QL: NEGATIVE
GFR, ESTIMATED: 86 ML/MIN/1.73M2
GLUCOSE SERPL-MCNC: 142 MG/DL (ref 74–99)
GLUCOSE UR STRIP-MCNC: NEGATIVE MG/DL
HCT VFR BLD AUTO: 41.3 % (ref 36.3–47.1)
HGB BLD-MCNC: 13.7 G/DL (ref 11.9–15.1)
HGB UR QL STRIP.AUTO: NEGATIVE
IMM GRANULOCYTES # BLD AUTO: <0.03 K/UL (ref 0–0.3)
IMM GRANULOCYTES NFR BLD: 0 %
KETONES UR STRIP-MCNC: ABNORMAL MG/DL
LACTATE BLDV-SCNC: 1.2 MMOL/L (ref 0.5–2.2)
LEUKOCYTE ESTERASE UR QL STRIP: NEGATIVE
LYMPHOCYTES NFR BLD: 0.77 K/UL (ref 1.1–3.7)
LYMPHOCYTES RELATIVE PERCENT: 9 % (ref 24–43)
MAGNESIUM SERPL-MCNC: 2.1 MG/DL (ref 1.7–2.3)
MCH RBC QN AUTO: 30 PG (ref 25.2–33.5)
MCHC RBC AUTO-ENTMCNC: 33.2 G/DL (ref 28.4–34.8)
MCV RBC AUTO: 90.4 FL (ref 82.6–102.9)
MONOCYTES NFR BLD: 0.59 K/UL (ref 0.1–1.2)
MONOCYTES NFR BLD: 7 % (ref 3–12)
NEUTROPHILS NFR BLD: 83 % (ref 36–65)
NEUTS SEG NFR BLD: 6.76 K/UL (ref 1.5–8.1)
NITRITE UR QL STRIP: POSITIVE
NRBC BLD-RTO: 0 PER 100 WBC
PH UR STRIP: 6.5 [PH] (ref 5–9)
PLATELET # BLD AUTO: 212 K/UL (ref 138–453)
PMV BLD AUTO: 9.5 FL (ref 8.1–13.5)
POTASSIUM SERPL-SCNC: 4.2 MMOL/L (ref 3.7–5.3)
PROT SERPL-MCNC: 7.2 G/DL (ref 6.6–8.7)
PROT UR STRIP-MCNC: NEGATIVE MG/DL
RBC # BLD AUTO: 4.57 M/UL (ref 3.95–5.11)
RBC #/AREA URNS HPF: ABNORMAL /HPF (ref 0–2)
SARS-COV-2 RDRP RESP QL NAA+PROBE: NOT DETECTED
SODIUM SERPL-SCNC: 127 MMOL/L (ref 136–145)
SP GR UR STRIP: 1.01 (ref 1.01–1.02)
SPECIMEN DESCRIPTION: NORMAL
TROPONIN I SERPL HS-MCNC: 15 NG/L (ref 0–14)
TROPONIN I SERPL HS-MCNC: 15 NG/L (ref 0–14)
UROBILINOGEN UR STRIP-ACNC: NORMAL EU/DL (ref 0–1)
WBC #/AREA URNS HPF: ABNORMAL /HPF (ref 0–5)
WBC OTHER # BLD: 8.2 K/UL (ref 3.5–11.3)

## 2024-11-07 PROCEDURE — 2580000003 HC RX 258: Performed by: STUDENT IN AN ORGANIZED HEALTH CARE EDUCATION/TRAINING PROGRAM

## 2024-11-07 PROCEDURE — 94640 AIRWAY INHALATION TREATMENT: CPT

## 2024-11-07 PROCEDURE — 87040 BLOOD CULTURE FOR BACTERIA: CPT

## 2024-11-07 PROCEDURE — 6360000004 HC RX CONTRAST MEDICATION

## 2024-11-07 PROCEDURE — 96374 THER/PROPH/DIAG INJ IV PUSH: CPT

## 2024-11-07 PROCEDURE — 6360000002 HC RX W HCPCS

## 2024-11-07 PROCEDURE — 87804 INFLUENZA ASSAY W/OPTIC: CPT

## 2024-11-07 PROCEDURE — 99285 EMERGENCY DEPT VISIT HI MDM: CPT

## 2024-11-07 PROCEDURE — 83735 ASSAY OF MAGNESIUM: CPT

## 2024-11-07 PROCEDURE — 87086 URINE CULTURE/COLONY COUNT: CPT

## 2024-11-07 PROCEDURE — 94761 N-INVAS EAR/PLS OXIMETRY MLT: CPT

## 2024-11-07 PROCEDURE — 81001 URINALYSIS AUTO W/SCOPE: CPT

## 2024-11-07 PROCEDURE — 87186 SC STD MICRODIL/AGAR DIL: CPT

## 2024-11-07 PROCEDURE — 87077 CULTURE AEROBIC IDENTIFY: CPT

## 2024-11-07 PROCEDURE — 80053 COMPREHEN METABOLIC PANEL: CPT

## 2024-11-07 PROCEDURE — 70450 CT HEAD/BRAIN W/O DYE: CPT

## 2024-11-07 PROCEDURE — 87635 SARS-COV-2 COVID-19 AMP PRB: CPT

## 2024-11-07 PROCEDURE — 0202U NFCT DS 22 TRGT SARS-COV-2: CPT

## 2024-11-07 PROCEDURE — 94664 DEMO&/EVAL PT USE INHALER: CPT

## 2024-11-07 PROCEDURE — 94669 MECHANICAL CHEST WALL OSCILL: CPT

## 2024-11-07 PROCEDURE — 1200000000 HC SEMI PRIVATE

## 2024-11-07 PROCEDURE — 6370000000 HC RX 637 (ALT 250 FOR IP): Performed by: STUDENT IN AN ORGANIZED HEALTH CARE EDUCATION/TRAINING PROGRAM

## 2024-11-07 PROCEDURE — 93005 ELECTROCARDIOGRAM TRACING: CPT

## 2024-11-07 PROCEDURE — 6370000000 HC RX 637 (ALT 250 FOR IP)

## 2024-11-07 PROCEDURE — 6360000002 HC RX W HCPCS: Performed by: STUDENT IN AN ORGANIZED HEALTH CARE EDUCATION/TRAINING PROGRAM

## 2024-11-07 PROCEDURE — 71260 CT THORAX DX C+: CPT

## 2024-11-07 PROCEDURE — 2700000000 HC OXYGEN THERAPY PER DAY

## 2024-11-07 PROCEDURE — 84484 ASSAY OF TROPONIN QUANT: CPT

## 2024-11-07 PROCEDURE — 36415 COLL VENOUS BLD VENIPUNCTURE: CPT

## 2024-11-07 PROCEDURE — 71045 X-RAY EXAM CHEST 1 VIEW: CPT

## 2024-11-07 PROCEDURE — 83880 ASSAY OF NATRIURETIC PEPTIDE: CPT

## 2024-11-07 PROCEDURE — 83605 ASSAY OF LACTIC ACID: CPT

## 2024-11-07 PROCEDURE — 84145 PROCALCITONIN (PCT): CPT

## 2024-11-07 PROCEDURE — 86140 C-REACTIVE PROTEIN: CPT

## 2024-11-07 PROCEDURE — 96375 TX/PRO/DX INJ NEW DRUG ADDON: CPT

## 2024-11-07 PROCEDURE — 85025 COMPLETE CBC W/AUTO DIFF WBC: CPT

## 2024-11-07 RX ORDER — LORAZEPAM 1 MG/1
1 TABLET ORAL ONCE
Status: COMPLETED | OUTPATIENT
Start: 2024-11-07 | End: 2024-11-07

## 2024-11-07 RX ORDER — ONDANSETRON 2 MG/ML
4 INJECTION INTRAMUSCULAR; INTRAVENOUS ONCE
Status: COMPLETED | OUTPATIENT
Start: 2024-11-07 | End: 2024-11-07

## 2024-11-07 RX ORDER — ALBUTEROL SULFATE 0.83 MG/ML
2.5 SOLUTION RESPIRATORY (INHALATION) EVERY 4 HOURS PRN
Status: DISCONTINUED | OUTPATIENT
Start: 2024-11-07 | End: 2024-11-09 | Stop reason: HOSPADM

## 2024-11-07 RX ORDER — ONDANSETRON 2 MG/ML
4 INJECTION INTRAMUSCULAR; INTRAVENOUS EVERY 6 HOURS PRN
Status: DISCONTINUED | OUTPATIENT
Start: 2024-11-07 | End: 2024-11-09 | Stop reason: HOSPADM

## 2024-11-07 RX ORDER — SODIUM CHLORIDE 0.9 % (FLUSH) 0.9 %
5-40 SYRINGE (ML) INJECTION EVERY 12 HOURS SCHEDULED
Status: DISCONTINUED | OUTPATIENT
Start: 2024-11-07 | End: 2024-11-09 | Stop reason: HOSPADM

## 2024-11-07 RX ORDER — ENOXAPARIN SODIUM 100 MG/ML
40 INJECTION SUBCUTANEOUS DAILY
Status: DISCONTINUED | OUTPATIENT
Start: 2024-11-07 | End: 2024-11-09 | Stop reason: HOSPADM

## 2024-11-07 RX ORDER — ONDANSETRON 2 MG/ML
INJECTION INTRAMUSCULAR; INTRAVENOUS
Status: COMPLETED
Start: 2024-11-07 | End: 2024-11-07

## 2024-11-07 RX ORDER — ASPIRIN 81 MG/1
81 TABLET ORAL NIGHTLY
Status: DISCONTINUED | OUTPATIENT
Start: 2024-11-08 | End: 2024-11-09 | Stop reason: HOSPADM

## 2024-11-07 RX ORDER — FAMOTIDINE 20 MG/1
20 TABLET, FILM COATED ORAL 2 TIMES DAILY
Status: DISCONTINUED | OUTPATIENT
Start: 2024-11-07 | End: 2024-11-07

## 2024-11-07 RX ORDER — IPRATROPIUM BROMIDE AND ALBUTEROL SULFATE 2.5; .5 MG/3ML; MG/3ML
1 SOLUTION RESPIRATORY (INHALATION) EVERY 4 HOURS PRN
Status: DISCONTINUED | OUTPATIENT
Start: 2024-11-07 | End: 2024-11-07

## 2024-11-07 RX ORDER — LABETALOL HYDROCHLORIDE 5 MG/ML
10 INJECTION, SOLUTION INTRAVENOUS ONCE
Status: COMPLETED | OUTPATIENT
Start: 2024-11-07 | End: 2024-11-07

## 2024-11-07 RX ORDER — GUAIFENESIN/DEXTROMETHORPHAN 100-10MG/5
5 SYRUP ORAL EVERY 4 HOURS PRN
Status: DISCONTINUED | OUTPATIENT
Start: 2024-11-07 | End: 2024-11-09 | Stop reason: HOSPADM

## 2024-11-07 RX ORDER — ACETAMINOPHEN 650 MG/1
650 SUPPOSITORY RECTAL EVERY 6 HOURS PRN
Status: DISCONTINUED | OUTPATIENT
Start: 2024-11-07 | End: 2024-11-09 | Stop reason: HOSPADM

## 2024-11-07 RX ORDER — GUAIFENESIN 600 MG/1
600 TABLET, EXTENDED RELEASE ORAL 2 TIMES DAILY
Status: DISCONTINUED | OUTPATIENT
Start: 2024-11-07 | End: 2024-11-09 | Stop reason: HOSPADM

## 2024-11-07 RX ORDER — LOSARTAN POTASSIUM 50 MG/1
100 TABLET ORAL DAILY
Status: DISCONTINUED | OUTPATIENT
Start: 2024-11-08 | End: 2024-11-09 | Stop reason: HOSPADM

## 2024-11-07 RX ORDER — SODIUM CHLORIDE 1 G/1
1 TABLET ORAL 2 TIMES DAILY
Status: DISCONTINUED | OUTPATIENT
Start: 2024-11-08 | End: 2024-11-09 | Stop reason: HOSPADM

## 2024-11-07 RX ORDER — CLONIDINE 0.3 MG/24H
1 PATCH, EXTENDED RELEASE TRANSDERMAL
Status: DISCONTINUED | OUTPATIENT
Start: 2024-11-08 | End: 2024-11-08

## 2024-11-07 RX ORDER — IOPAMIDOL 755 MG/ML
75 INJECTION, SOLUTION INTRAVASCULAR
Status: COMPLETED | OUTPATIENT
Start: 2024-11-07 | End: 2024-11-07

## 2024-11-07 RX ORDER — ALBUTEROL SULFATE 0.83 MG/ML
2.5 SOLUTION RESPIRATORY (INHALATION)
Status: DISCONTINUED | OUTPATIENT
Start: 2024-11-07 | End: 2024-11-09 | Stop reason: HOSPADM

## 2024-11-07 RX ORDER — CARVEDILOL 25 MG/1
25 TABLET ORAL 2 TIMES DAILY
Status: DISCONTINUED | OUTPATIENT
Start: 2024-11-08 | End: 2024-11-09 | Stop reason: HOSPADM

## 2024-11-07 RX ORDER — FUROSEMIDE 10 MG/ML
40 INJECTION INTRAMUSCULAR; INTRAVENOUS DAILY
Status: DISCONTINUED | OUTPATIENT
Start: 2024-11-08 | End: 2024-11-09 | Stop reason: HOSPADM

## 2024-11-07 RX ORDER — BENZONATATE 100 MG/1
100 CAPSULE ORAL 3 TIMES DAILY PRN
Status: DISCONTINUED | OUTPATIENT
Start: 2024-11-07 | End: 2024-11-09 | Stop reason: HOSPADM

## 2024-11-07 RX ORDER — SODIUM CHLORIDE 9 MG/ML
INJECTION, SOLUTION INTRAVENOUS PRN
Status: DISCONTINUED | OUTPATIENT
Start: 2024-11-07 | End: 2024-11-09 | Stop reason: HOSPADM

## 2024-11-07 RX ORDER — ONDANSETRON 4 MG/1
4 TABLET, ORALLY DISINTEGRATING ORAL EVERY 8 HOURS PRN
Status: DISCONTINUED | OUTPATIENT
Start: 2024-11-07 | End: 2024-11-09 | Stop reason: HOSPADM

## 2024-11-07 RX ORDER — IPRATROPIUM BROMIDE AND ALBUTEROL SULFATE 2.5; .5 MG/3ML; MG/3ML
1 SOLUTION RESPIRATORY (INHALATION)
Status: DISCONTINUED | OUTPATIENT
Start: 2024-11-07 | End: 2024-11-08

## 2024-11-07 RX ORDER — IPRATROPIUM BROMIDE AND ALBUTEROL SULFATE 2.5; .5 MG/3ML; MG/3ML
1 SOLUTION RESPIRATORY (INHALATION) ONCE
Status: COMPLETED | OUTPATIENT
Start: 2024-11-07 | End: 2024-11-07

## 2024-11-07 RX ORDER — HYDRALAZINE HYDROCHLORIDE 20 MG/ML
10 INJECTION INTRAMUSCULAR; INTRAVENOUS ONCE
Status: COMPLETED | OUTPATIENT
Start: 2024-11-07 | End: 2024-11-07

## 2024-11-07 RX ORDER — SODIUM CHLORIDE 0.9 % (FLUSH) 0.9 %
5-40 SYRINGE (ML) INJECTION PRN
Status: DISCONTINUED | OUTPATIENT
Start: 2024-11-07 | End: 2024-11-09 | Stop reason: HOSPADM

## 2024-11-07 RX ORDER — PANTOPRAZOLE SODIUM 40 MG/1
40 TABLET, DELAYED RELEASE ORAL
Status: DISCONTINUED | OUTPATIENT
Start: 2024-11-08 | End: 2024-11-09 | Stop reason: HOSPADM

## 2024-11-07 RX ORDER — POLYETHYLENE GLYCOL 3350 17 G/17G
17 POWDER, FOR SOLUTION ORAL DAILY PRN
Status: DISCONTINUED | OUTPATIENT
Start: 2024-11-07 | End: 2024-11-09 | Stop reason: HOSPADM

## 2024-11-07 RX ORDER — FUROSEMIDE 10 MG/ML
40 INJECTION INTRAMUSCULAR; INTRAVENOUS 2 TIMES DAILY
Status: DISCONTINUED | OUTPATIENT
Start: 2024-11-07 | End: 2024-11-07

## 2024-11-07 RX ORDER — ACETAMINOPHEN 325 MG/1
650 TABLET ORAL EVERY 6 HOURS PRN
Status: DISCONTINUED | OUTPATIENT
Start: 2024-11-07 | End: 2024-11-09 | Stop reason: HOSPADM

## 2024-11-07 RX ORDER — BENZONATATE 100 MG/1
200 CAPSULE ORAL ONCE
Status: COMPLETED | OUTPATIENT
Start: 2024-11-07 | End: 2024-11-07

## 2024-11-07 RX ORDER — ALBUTEROL SULFATE 0.83 MG/ML
2.5 SOLUTION RESPIRATORY (INHALATION)
Status: DISCONTINUED | OUTPATIENT
Start: 2024-11-07 | End: 2024-11-07

## 2024-11-07 RX ADMIN — GUAIFENESIN 600 MG: 600 TABLET, EXTENDED RELEASE ORAL at 20:38

## 2024-11-07 RX ADMIN — ALBUTEROL SULFATE 2.5 MG: 2.5 SOLUTION RESPIRATORY (INHALATION) at 20:15

## 2024-11-07 RX ADMIN — ONDANSETRON 4 MG: 2 INJECTION INTRAMUSCULAR; INTRAVENOUS at 13:11

## 2024-11-07 RX ADMIN — ONDANSETRON 4 MG: 2 INJECTION, SOLUTION INTRAMUSCULAR; INTRAVENOUS at 13:11

## 2024-11-07 RX ADMIN — HYDRALAZINE HYDROCHLORIDE 10 MG: 20 INJECTION INTRAMUSCULAR; INTRAVENOUS at 14:50

## 2024-11-07 RX ADMIN — LORAZEPAM 1 MG: 1 TABLET ORAL at 15:31

## 2024-11-07 RX ADMIN — IPRATROPIUM BROMIDE AND ALBUTEROL SULFATE 1 DOSE: 2.5; .5 SOLUTION RESPIRATORY (INHALATION) at 13:27

## 2024-11-07 RX ADMIN — GUAIFENESIN, DEXTROMETHORPHAN HBR 1 TABLET: 600; 30 TABLET ORAL at 15:34

## 2024-11-07 RX ADMIN — SODIUM CHLORIDE, PRESERVATIVE FREE 10 ML: 5 INJECTION INTRAVENOUS at 20:37

## 2024-11-07 RX ADMIN — FAMOTIDINE 20 MG: 20 TABLET, FILM COATED ORAL at 20:38

## 2024-11-07 RX ADMIN — ENOXAPARIN SODIUM 40 MG: 100 INJECTION SUBCUTANEOUS at 18:16

## 2024-11-07 RX ADMIN — IOPAMIDOL 75 ML: 755 INJECTION, SOLUTION INTRAVENOUS at 14:03

## 2024-11-07 RX ADMIN — FUROSEMIDE 40 MG: 10 INJECTION, SOLUTION INTRAMUSCULAR; INTRAVENOUS at 18:16

## 2024-11-07 RX ADMIN — LABETALOL HYDROCHLORIDE 10 MG: 5 INJECTION INTRAVENOUS at 12:44

## 2024-11-07 RX ADMIN — BENZONATATE 200 MG: 100 CAPSULE ORAL at 15:32

## 2024-11-07 ASSESSMENT — PAIN - FUNCTIONAL ASSESSMENT: PAIN_FUNCTIONAL_ASSESSMENT: NONE - DENIES PAIN

## 2024-11-07 NOTE — PROGRESS NOTES
Patient resting comfortably at this time. Patient denies any pain and denies any other needs at this time. Patient alert & oriented x4 and able to answer all questions appropriately and follow commands. Patient states she just \"feels weak.\" Assessment and vitals as charted. Bed alarm on, bed locked, gripper socks on, call light within reach and able to use appropriately. Will continue to monitor this shift.

## 2024-11-07 NOTE — ED PROVIDER NOTES
460ms  Rhythm: Sinus with first-degree AV block  Interpretation: Sinus with first-degree AV block, no ST elevation  Comparison: stable as compared to patient's most recent EKG      [TC]      ED Course User Index  [TC] Yamilet Davis MD       Medications   sodium chloride flush 0.9 % injection 5-40 mL (has no administration in time range)   sodium chloride flush 0.9 % injection 5-40 mL (has no administration in time range)   0.9 % sodium chloride infusion (has no administration in time range)   famotidine (PEPCID) tablet 20 mg (has no administration in time range)   enoxaparin (LOVENOX) injection 40 mg (has no administration in time range)   ondansetron (ZOFRAN-ODT) disintegrating tablet 4 mg (has no administration in time range)     Or   ondansetron (ZOFRAN) injection 4 mg (has no administration in time range)   polyethylene glycol (GLYCOLAX) packet 17 g (has no administration in time range)   acetaminophen (TYLENOL) tablet 650 mg (has no administration in time range)     Or   acetaminophen (TYLENOL) suppository 650 mg (has no administration in time range)   ipratropium 0.5 mg-albuterol 2.5 mg (DUONEB) nebulizer solution 1 Dose (has no administration in time range)   albuterol (PROVENTIL) (2.5 MG/3ML) 0.083% nebulizer solution 2.5 mg (has no administration in time range)   albuterol (PROVENTIL) (2.5 MG/3ML) 0.083% nebulizer solution 2.5 mg (has no administration in time range)   guaiFENesin-dextromethorphan (ROBITUSSIN DM) 100-10 MG/5ML syrup 5 mL (has no administration in time range)   guaiFENesin (MUCINEX) extended release tablet 600 mg (has no administration in time range)   benzonatate (TESSALON) capsule 100 mg (has no administration in time range)   furosemide (LASIX) injection 40 mg (has no administration in time range)   ipratropium 0.5 mg-albuterol 2.5 mg (DUONEB) nebulizer solution 1 Dose (1 Dose Inhalation Given 11/7/24 1327)   labetalol (NORMODYNE;TRANDATE) injection 10 mg (10 mg IntraVENous Given  11/7/24 1244)   ondansetron (ZOFRAN) injection 4 mg (4 mg IntraVENous Given 11/7/24 1311)   iopamidol (ISOVUE-370) 76 % injection 75 mL (75 mLs IntraVENous Given 11/7/24 1403)   hydrALAZINE (APRESOLINE) injection 10 mg (10 mg IntraVENous Given 11/7/24 1450)   benzonatate (TESSALON) capsule 200 mg (200 mg Oral Given 11/7/24 1532)   dextromethorphan-guaiFENesin (MUCINEX DM)  MG per extended release tablet 1 tablet (1 tablet Oral Given 11/7/24 1534)   LORazepam (ATIVAN) tablet 1 mg (1 mg Oral Given 11/7/24 1531)       Current Discharge Medication List            Counseling:   The emergency provider has spoken with the patient and discussed today’s results, in addition to providing specific details for the plan of care and counseling regarding the diagnosis and prognosis.  Questions are answered at this time and they are agreeable with the plan.       --------------------------------- IMPRESSION AND DISPOSITION ---------------------------------    IMPRESSION  1. Acute on chronic congestive heart failure, unspecified heart failure type (HCC)    2. Acute respiratory failure with hypoxia    3. Acute upper respiratory infection    4. Hyponatremia        DISPOSITION  Disposition: Admit to telemetry  Patient condition is stable        NOTE: This report was transcribed using voice recognition software. Every effort was made to ensure accuracy; however, inadvertent computerized transcription errors may be present           Yamilet Davis MD  11/07/24 1945

## 2024-11-08 ENCOUNTER — APPOINTMENT (OUTPATIENT)
Age: 89
DRG: 291 | End: 2024-11-08
Payer: MEDICARE

## 2024-11-08 LAB
ANION GAP SERPL CALCULATED.3IONS-SCNC: 11 MMOL/L (ref 9–16)
B PARAP IS1001 DNA NPH QL NAA+NON-PROBE: NOT DETECTED
B PERT DNA SPEC QL NAA+PROBE: NOT DETECTED
BASOPHILS # BLD: <0.03 K/UL (ref 0–0.2)
BASOPHILS NFR BLD: 0 % (ref 0–2)
BUN SERPL-MCNC: 13 MG/DL (ref 8–23)
BUN/CREAT SERPL: 19 (ref 9–20)
C PNEUM DNA NPH QL NAA+NON-PROBE: NOT DETECTED
CALCIUM SERPL-MCNC: 9 MG/DL (ref 8.6–10.4)
CHLORIDE SERPL-SCNC: 93 MMOL/L (ref 98–107)
CO2 SERPL-SCNC: 24 MMOL/L (ref 20–31)
CREAT SERPL-MCNC: 0.7 MG/DL (ref 0.5–0.9)
CRP SERPL HS-MCNC: 33.2 MG/L (ref 0–5)
ECHO AO ASC DIAM: 2.9 CM
ECHO AO ASCENDING AORTA INDEX: 1.49 CM/M2
ECHO AO ROOT DIAM: 1.5 CM
ECHO AO ROOT INDEX: 0.77 CM/M2
ECHO AO SINUS VALSALVA DIAM: 2.9 CM
ECHO AO SINUS VALSALVA INDEX: 1.49 CM/M2
ECHO AO ST JNCT DIAM: 2 CM
ECHO AV AREA PEAK VELOCITY: 2.3 CM2
ECHO AV AREA VTI: 2.2 CM2
ECHO AV AREA/BSA PEAK VELOCITY: 1.2 CM2/M2
ECHO AV AREA/BSA VTI: 1.1 CM2/M2
ECHO AV CUSP MM: 1.7 CM
ECHO AV MEAN GRADIENT: 12 MMHG
ECHO AV MEAN VELOCITY: 1.6 M/S
ECHO AV PEAK GRADIENT: 23 MMHG
ECHO AV PEAK VELOCITY: 2.4 M/S
ECHO AV VELOCITY RATIO: 0.71
ECHO AV VTI: 50 CM
ECHO BSA: 1.99 M2
ECHO EST RA PRESSURE: 8 MMHG
ECHO LA AREA 2C: 31.5 CM2
ECHO LA AREA 4C: 30.4 CM2
ECHO LA MAJOR AXIS: 6.9 CM
ECHO LA MINOR AXIS: 7 CM
ECHO LA VOL BP: 111 ML (ref 22–52)
ECHO LA VOL MOD A2C: 114 ML (ref 22–52)
ECHO LA VOL MOD A4C: 108 ML (ref 22–52)
ECHO LA VOL/BSA BIPLANE: 57 ML/M2 (ref 16–34)
ECHO LA VOLUME INDEX MOD A2C: 58 ML/M2 (ref 16–34)
ECHO LA VOLUME INDEX MOD A4C: 55 ML/M2 (ref 16–34)
ECHO LV E' LATERAL VELOCITY: 5.6 CM/S
ECHO LV EDV A2C: 57 ML
ECHO LV EDV A4C: 84 ML
ECHO LV EDV INDEX A4C: 43 ML/M2
ECHO LV EDV NDEX A2C: 29 ML/M2
ECHO LV EJECTION FRACTION A2C: 71 %
ECHO LV EJECTION FRACTION A4C: 73 %
ECHO LV EJECTION FRACTION BIPLANE: 72 % (ref 55–100)
ECHO LV ESV A2C: 16 ML
ECHO LV ESV A4C: 23 ML
ECHO LV ESV INDEX A2C: 8 ML/M2
ECHO LV ESV INDEX A4C: 12 ML/M2
ECHO LV FRACTIONAL SHORTENING: 45 % (ref 28–44)
ECHO LV INTERNAL DIMENSION DIASTOLE INDEX: 2.15 CM/M2
ECHO LV INTERNAL DIMENSION DIASTOLIC: 4.2 CM (ref 3.9–5.3)
ECHO LV INTERNAL DIMENSION SYSTOLIC INDEX: 1.18 CM/M2
ECHO LV INTERNAL DIMENSION SYSTOLIC: 2.3 CM
ECHO LV IVSD: 1.4 CM (ref 0.6–0.9)
ECHO LV MASS 2D: 224.3 G (ref 67–162)
ECHO LV MASS INDEX 2D: 115 G/M2 (ref 43–95)
ECHO LV POSTERIOR WALL DIASTOLIC: 1.4 CM (ref 0.6–0.9)
ECHO LV RELATIVE WALL THICKNESS RATIO: 0.67
ECHO LVOT AREA: 3.1 CM2
ECHO LVOT AV VTI INDEX: 0.71
ECHO LVOT DIAM: 2 CM
ECHO LVOT MEAN GRADIENT: 6 MMHG
ECHO LVOT PEAK GRADIENT: 12 MMHG
ECHO LVOT PEAK VELOCITY: 1.7 M/S
ECHO LVOT STROKE VOLUME INDEX: 57.2 ML/M2
ECHO LVOT SV: 111.5 ML
ECHO LVOT VTI: 35.5 CM
ECHO MV A VELOCITY: 0.62 M/S
ECHO MV AREA VTI: 2.7 CM2
ECHO MV E DECELERATION TIME (DT): 161 MS
ECHO MV E VELOCITY: 1.17 M/S
ECHO MV E/A RATIO: 1.89
ECHO MV E/E' LATERAL: 20.89
ECHO MV EROA CONT EQ: 0.5 CM2
ECHO MV LVOT VTI INDEX: 1.17
ECHO MV MAX VELOCITY: 1.8 M/S
ECHO MV MEAN GRADIENT: 3 MMHG
ECHO MV MEAN VELOCITY: 0.8 M/S
ECHO MV PEAK GRADIENT: 12 MMHG
ECHO MV REGURGITANT ALIASING (NYQUIST) VELOCITY: 30 CM/S
ECHO MV REGURGITANT PEAK GRADIENT: 130 MMHG
ECHO MV REGURGITANT PEAK VELOCITY: 5.7 M/S
ECHO MV REGURGITANT RADIUS PISA: 1.1 CM
ECHO MV REGURGITANT VTIA: 157 CM
ECHO MV VTI: 41.7 CM
ECHO PULMONARY ARTERY END DIASTOLIC PRESSURE: 6 MMHG
ECHO PV MAX VELOCITY: 1.3 M/S
ECHO PV PEAK GRADIENT: 7 MMHG
ECHO PV REGURGITANT MAX VELOCITY: 1.3 M/S
ECHO RIGHT VENTRICULAR SYSTOLIC PRESSURE (RVSP): 55 MMHG
ECHO TV REGURGITANT MAX VELOCITY: 3.42 M/S
ECHO TV REGURGITANT PEAK GRADIENT: 47 MMHG
EKG ATRIAL RATE: 78 BPM
EKG P AXIS: 89 DEGREES
EKG P-R INTERVAL: 330 MS
EKG Q-T INTERVAL: 404 MS
EKG QRS DURATION: 92 MS
EKG QTC CALCULATION (BAZETT): 460 MS
EKG R AXIS: -28 DEGREES
EKG T AXIS: 63 DEGREES
EKG VENTRICULAR RATE: 78 BPM
EOSINOPHIL # BLD: 0.09 K/UL (ref 0–0.44)
EOSINOPHILS RELATIVE PERCENT: 1 % (ref 1–4)
ERYTHROCYTE [DISTWIDTH] IN BLOOD BY AUTOMATED COUNT: 13.2 % (ref 11.8–14.4)
FLUAV RNA NPH QL NAA+NON-PROBE: NOT DETECTED
FLUBV RNA NPH QL NAA+NON-PROBE: NOT DETECTED
GFR, ESTIMATED: 83 ML/MIN/1.73M2
GLUCOSE SERPL-MCNC: 110 MG/DL (ref 74–99)
HADV DNA NPH QL NAA+NON-PROBE: NOT DETECTED
HCOV 229E RNA NPH QL NAA+NON-PROBE: NOT DETECTED
HCOV HKU1 RNA NPH QL NAA+NON-PROBE: NOT DETECTED
HCOV NL63 RNA NPH QL NAA+NON-PROBE: NOT DETECTED
HCOV OC43 RNA NPH QL NAA+NON-PROBE: NOT DETECTED
HCT VFR BLD AUTO: 36.3 % (ref 36.3–47.1)
HGB BLD-MCNC: 12.1 G/DL (ref 11.9–15.1)
HMPV RNA NPH QL NAA+NON-PROBE: NOT DETECTED
HPIV1 RNA NPH QL NAA+NON-PROBE: NOT DETECTED
HPIV2 RNA NPH QL NAA+NON-PROBE: NOT DETECTED
HPIV3 RNA NPH QL NAA+NON-PROBE: NOT DETECTED
HPIV4 RNA NPH QL NAA+NON-PROBE: NOT DETECTED
IMM GRANULOCYTES # BLD AUTO: <0.03 K/UL (ref 0–0.3)
IMM GRANULOCYTES NFR BLD: 0 %
LYMPHOCYTES NFR BLD: 1.2 K/UL (ref 1.1–3.7)
LYMPHOCYTES RELATIVE PERCENT: 17 % (ref 24–43)
M PNEUMO DNA NPH QL NAA+NON-PROBE: NOT DETECTED
MCH RBC QN AUTO: 30.3 PG (ref 25.2–33.5)
MCHC RBC AUTO-ENTMCNC: 33.3 G/DL (ref 28.4–34.8)
MCV RBC AUTO: 91 FL (ref 82.6–102.9)
MONOCYTES NFR BLD: 0.79 K/UL (ref 0.1–1.2)
MONOCYTES NFR BLD: 11 % (ref 3–12)
NEUTROPHILS NFR BLD: 71 % (ref 36–65)
NEUTS SEG NFR BLD: 4.92 K/UL (ref 1.5–8.1)
NRBC BLD-RTO: 0 PER 100 WBC
PLATELET # BLD AUTO: 194 K/UL (ref 138–453)
PMV BLD AUTO: 9.6 FL (ref 8.1–13.5)
POTASSIUM SERPL-SCNC: 3.9 MMOL/L (ref 3.7–5.3)
PROCALCITONIN SERPL-MCNC: 0.02 NG/ML (ref 0–0.09)
PROCALCITONIN SERPL-MCNC: <0.02 NG/ML (ref 0–0.09)
RBC # BLD AUTO: 3.99 M/UL (ref 3.95–5.11)
RSV RNA NPH QL NAA+NON-PROBE: NOT DETECTED
RV+EV RNA NPH QL NAA+NON-PROBE: NOT DETECTED
SARS-COV-2 RNA NPH QL NAA+NON-PROBE: NOT DETECTED
SODIUM SERPL-SCNC: 128 MMOL/L (ref 136–145)
SPECIMEN DESCRIPTION: NORMAL
WBC OTHER # BLD: 7 K/UL (ref 3.5–11.3)

## 2024-11-08 PROCEDURE — 6370000000 HC RX 637 (ALT 250 FOR IP)

## 2024-11-08 PROCEDURE — 80048 BASIC METABOLIC PNL TOTAL CA: CPT

## 2024-11-08 PROCEDURE — 94761 N-INVAS EAR/PLS OXIMETRY MLT: CPT

## 2024-11-08 PROCEDURE — 6360000002 HC RX W HCPCS: Performed by: STUDENT IN AN ORGANIZED HEALTH CARE EDUCATION/TRAINING PROGRAM

## 2024-11-08 PROCEDURE — 97166 OT EVAL MOD COMPLEX 45 MIN: CPT

## 2024-11-08 PROCEDURE — 6370000000 HC RX 637 (ALT 250 FOR IP): Performed by: INTERNAL MEDICINE

## 2024-11-08 PROCEDURE — 1200000000 HC SEMI PRIVATE

## 2024-11-08 PROCEDURE — 93306 TTE W/DOPPLER COMPLETE: CPT

## 2024-11-08 PROCEDURE — 93010 ELECTROCARDIOGRAM REPORT: CPT | Performed by: FAMILY MEDICINE

## 2024-11-08 PROCEDURE — 2580000003 HC RX 258: Performed by: STUDENT IN AN ORGANIZED HEALTH CARE EDUCATION/TRAINING PROGRAM

## 2024-11-08 PROCEDURE — 86140 C-REACTIVE PROTEIN: CPT

## 2024-11-08 PROCEDURE — 6370000000 HC RX 637 (ALT 250 FOR IP): Performed by: STUDENT IN AN ORGANIZED HEALTH CARE EDUCATION/TRAINING PROGRAM

## 2024-11-08 PROCEDURE — 93306 TTE W/DOPPLER COMPLETE: CPT | Performed by: INTERNAL MEDICINE

## 2024-11-08 PROCEDURE — 84145 PROCALCITONIN (PCT): CPT

## 2024-11-08 PROCEDURE — 94664 DEMO&/EVAL PT USE INHALER: CPT

## 2024-11-08 PROCEDURE — 97116 GAIT TRAINING THERAPY: CPT

## 2024-11-08 PROCEDURE — 94640 AIRWAY INHALATION TREATMENT: CPT

## 2024-11-08 PROCEDURE — 2700000000 HC OXYGEN THERAPY PER DAY

## 2024-11-08 PROCEDURE — 94669 MECHANICAL CHEST WALL OSCILL: CPT

## 2024-11-08 PROCEDURE — 85025 COMPLETE CBC W/AUTO DIFF WBC: CPT

## 2024-11-08 PROCEDURE — 97162 PT EVAL MOD COMPLEX 30 MIN: CPT

## 2024-11-08 PROCEDURE — 6360000002 HC RX W HCPCS

## 2024-11-08 PROCEDURE — 36415 COLL VENOUS BLD VENIPUNCTURE: CPT

## 2024-11-08 RX ORDER — CEPHALEXIN 500 MG/1
500 CAPSULE ORAL EVERY 8 HOURS SCHEDULED
Status: DISCONTINUED | OUTPATIENT
Start: 2024-11-08 | End: 2024-11-09 | Stop reason: HOSPADM

## 2024-11-08 RX ORDER — IPRATROPIUM BROMIDE AND ALBUTEROL SULFATE 2.5; .5 MG/3ML; MG/3ML
1 SOLUTION RESPIRATORY (INHALATION)
Status: DISCONTINUED | OUTPATIENT
Start: 2024-11-08 | End: 2024-11-08

## 2024-11-08 RX ORDER — CLONIDINE 0.3 MG/24H
1 PATCH, EXTENDED RELEASE TRANSDERMAL
Status: DISCONTINUED | OUTPATIENT
Start: 2024-11-13 | End: 2024-11-09 | Stop reason: HOSPADM

## 2024-11-08 RX ORDER — IPRATROPIUM BROMIDE AND ALBUTEROL SULFATE 2.5; .5 MG/3ML; MG/3ML
1 SOLUTION RESPIRATORY (INHALATION)
Status: DISCONTINUED | OUTPATIENT
Start: 2024-11-08 | End: 2024-11-09 | Stop reason: HOSPADM

## 2024-11-08 RX ADMIN — IPRATROPIUM BROMIDE AND ALBUTEROL SULFATE 1 DOSE: 2.5; .5 SOLUTION RESPIRATORY (INHALATION) at 10:26

## 2024-11-08 RX ADMIN — IPRATROPIUM BROMIDE AND ALBUTEROL SULFATE 1 DOSE: 2.5; .5 SOLUTION RESPIRATORY (INHALATION) at 05:18

## 2024-11-08 RX ADMIN — CARVEDILOL 25 MG: 25 TABLET, FILM COATED ORAL at 08:36

## 2024-11-08 RX ADMIN — CEPHALEXIN 500 MG: 500 CAPSULE ORAL at 05:47

## 2024-11-08 RX ADMIN — Medication 1 G: at 08:36

## 2024-11-08 RX ADMIN — ENOXAPARIN SODIUM 40 MG: 100 INJECTION SUBCUTANEOUS at 08:36

## 2024-11-08 RX ADMIN — ONDANSETRON 4 MG: 2 INJECTION, SOLUTION INTRAMUSCULAR; INTRAVENOUS at 15:17

## 2024-11-08 RX ADMIN — CEPHALEXIN 500 MG: 500 CAPSULE ORAL at 20:58

## 2024-11-08 RX ADMIN — GUAIFENESIN 600 MG: 600 TABLET, EXTENDED RELEASE ORAL at 20:58

## 2024-11-08 RX ADMIN — LOSARTAN POTASSIUM 100 MG: 50 TABLET, FILM COATED ORAL at 08:36

## 2024-11-08 RX ADMIN — IPRATROPIUM BROMIDE AND ALBUTEROL SULFATE 1 DOSE: 2.5; .5 SOLUTION RESPIRATORY (INHALATION) at 15:44

## 2024-11-08 RX ADMIN — ONDANSETRON 4 MG: 4 TABLET, ORALLY DISINTEGRATING ORAL at 08:36

## 2024-11-08 RX ADMIN — ASPIRIN 81 MG: 81 TABLET, COATED ORAL at 20:58

## 2024-11-08 RX ADMIN — IPRATROPIUM BROMIDE AND ALBUTEROL SULFATE 1 DOSE: 2.5; .5 SOLUTION RESPIRATORY (INHALATION) at 20:19

## 2024-11-08 RX ADMIN — CEPHALEXIN 500 MG: 500 CAPSULE ORAL at 13:25

## 2024-11-08 RX ADMIN — ONDANSETRON 4 MG: 2 INJECTION, SOLUTION INTRAMUSCULAR; INTRAVENOUS at 21:34

## 2024-11-08 RX ADMIN — SODIUM CHLORIDE, PRESERVATIVE FREE 10 ML: 5 INJECTION INTRAVENOUS at 20:59

## 2024-11-08 RX ADMIN — Medication 1 G: at 20:58

## 2024-11-08 RX ADMIN — FUROSEMIDE 40 MG: 10 INJECTION, SOLUTION INTRAMUSCULAR; INTRAVENOUS at 08:36

## 2024-11-08 RX ADMIN — PANTOPRAZOLE SODIUM 40 MG: 40 TABLET, DELAYED RELEASE ORAL at 07:08

## 2024-11-08 RX ADMIN — SODIUM CHLORIDE, PRESERVATIVE FREE 10 ML: 5 INJECTION INTRAVENOUS at 08:36

## 2024-11-08 RX ADMIN — GUAIFENESIN 600 MG: 600 TABLET, EXTENDED RELEASE ORAL at 08:36

## 2024-11-08 RX ADMIN — POLYETHYLENE GLYCOL 3350 17 G: 17 POWDER, FOR SOLUTION ORAL at 15:40

## 2024-11-08 RX ADMIN — CARVEDILOL 25 MG: 25 TABLET, FILM COATED ORAL at 20:58

## 2024-11-08 NOTE — PROGRESS NOTES
Progress Note    SUBJECTIVE:  FU related to less shortness of breath.  Denies chest pain or chest pressure.  Still complains of a lot of phlegm.  She feels like she had some type of sickness.    OBJECTIVE:    Vitals:   TEMPERATURE:  Current - Temp: 97.6 °F (36.4 °C); Max - Temp  Av.6 °F (36.4 °C)  Min: 97.6 °F (36.4 °C)  Max: 97.7 °F (36.5 °C)  RESPIRATIONS RANGE: Resp  Av.2  Min: 15  Max: 28  PULSE RANGE: Pulse  Av.2  Min: 70  Max: 83  BLOOD PRESSURE RANGE:  Systolic (24hrs), Av , Min:114 , Max:212   ; Diastolic (24hrs), Av, Min:47, Max:125    PULSE OXIMETRY RANGE: SpO2  Av.4 %  Min: 88 %  Max: 96 %  24HR INTAKE/OUTPUT:    Intake/Output Summary (Last 24 hours) at 2024 0729  Last data filed at 2024 0423  Gross per 24 hour   Intake 650 ml   Output 1050 ml   Net -400 ml     -----------------------------------------------------------------  Exam:  General: alert  HEENT: Supple neck & negative  Heart: Regular  Lungs:  Slightly diminished breath sounds.  No rales.  No wheeze.  Abdomen: Normal & soft, No tenderness and BS normal  Extremities: Trace to 1+  Neuro: NonFocal     -----------------------------------------------------------------  Diagnostic Data:  Lab Results   Component Value Date    WBC 7.0 2024    HGB 12.1 2024     2024       Lab Results   Component Value Date    BUN 13 2024    CREATININE 0.7 2024     (L) 2024    K 3.9 2024    CALCIUM 9.0 2024    CL 93 (L) 2024    CO2 24 2024    LABGLOM 83 2024       Lab Results   Component Value Date    WBCUA 0 TO 2 2024    RBCUA 0 TO 2 2024    LEUKOCYTESUR NEGATIVE 2024    GLUCOSEU NEGATIVE 2024    KETUA TRACE (A) 2024    PROTEINU NEGATIVE 2024    HGBUR NEGATIVE 2024    BACTERIA 3+ (A) 2024       Lab Results   Component Value Date    PROBNP 1,722 (H) 2024       CT Head WO Contrast    Result Date:  not able to name a   surrogate decision maker or provide and advance care plan.    [] Hospice care is currently being provided or has been provided within the   calendar year.    []  I did NOT confirm today the presence of an Advance Care Plan or surrogate   decision maker documented within the patient's medical record.   [DOES NOT SATISFY [facility] PERFORMANCE]    Amandeep Ramos MD, M.D.

## 2024-11-08 NOTE — CARE COORDINATION
discharge plan with any other family members/significant others, and if so, who? (P) No  Plans to Return to Present Housing: (P) Yes  Other Identified Issues/Barriers to RETURNING to current housing: none.  Potential Assistance needed at discharge: (P) N/A            Potential DME:    Patient expects to discharge to: (P) House  Plan for transportation at discharge: (P) Family    Financial    Payor: MEDICARE / Plan: RAILROAD MEDICARE / Product Type: *No Product type* /     Does insurance require precert for SNF: Yes    Potential assistance Purchasing Medications: (P) No  Meds-to-Beds request:        [facility] - [facility] [address] [place] [address] -  [phone] - F [phone]  [address] [facility] [address]  Phone: [phone] Fax: [phone]    [facility] [id number] [facility] [address] [facility] [phone] - F [phone]  [address] [place] [address]  Phone: [phone] Fax: [phone]      Notes:    Factors facilitating achievement of predicted outcomes: Family support, Caregiver support, Friend support, Motivated, Cooperative, Pleasant, Sense of humor, Good insight into deficits, Has needed Durable Medical Equipment at home.    Barriers to discharge: Decreased endurance and Medication managment    Additional Case Management Notes: The patient lives alone and uses a cane and walker as needed, otherwise independent. She does her own laundry which is in her basement, cooks and cleans. Her daughter or granddaughter does her grocery shopping and drives her where she needs to go. She does not use any community resources at this time and does not feel she needs any other assistance at this time.  The plan for discharge is home with no additional services at this time.    The Plan for Transition of Care is related to the following treatment goals of Acute upper respiratory infection [J06.9]  Hyponatremia [E87.1]  Acute respiratory failure with hypoxia

## 2024-11-08 NOTE — H&P
protocol  Blood, urine, and respiratory viral panel is pending  Monitor labs and replace electrolytes  Telemetry monitoring  I&O, daily weights  Incentive spirometry/positive expiratory pressure therapy/vibratory airway clearance  Imaging: Echo ordered  Medications:   IV Lasix  Mucinex  Nebs  Medication Monitoring / High Risk Medications: none      Acute on chronic diastolic CHF  Condition is a chronic stable condition  Treatment plan:   Oxygen therapy protocol  I&O, daily weights  Telemetry monitoring  PT OT  Imaging: Echo ordered  Medications:   IV Lasix  Continue losartan  Continue carvedilol      Hypertension  Condition is a chronic stable condition  Treatment plan: Continue current treatment  Imaging: no further imaging studies ordered today  Medications:   Continue clonidine  Continue carvedilol  Continue losartan    Nutrition status:   at risk for malnutrition  Dietician consult initiated    Hospital Prophylaxis:   DVT: Lovenox   Stress Ulcer: PPI     MDM Data:   Test interpretation:  My independent EKG interpretation: normal sinus rhythm, 1st degree AV block  My independent X-ray interpretation:  CXR shows pulmonary vascular congestion  Management and/or test interpretation discussed with ER MD at time of admission  Consults and Nursing notes were personally reviewed, all current labs and imaging were personally reviewed, tests ordered: CBC, BMP, and history obtained by independent historian       Disposition:  Shared decision making: All test results, treatment options and disposition options were discussed with the patient today  Social determinants of health that may impact management: none  Code status: Full Code   Disposition: Discharge plan is home        Critical Care Time:  Total critical care time caring for this patient with life threatening, unstable organ failure, including direct patient contact, management of life support systems, review of data including imaging and labs, discussions with  prescribed or is already taking and ACE or ARB  [] Reason why ACE or ARB was not prescirbed / taking: not applicable    BETA-BLOCKERS:  [x] The patient was prescribed or is already taking a Beta-blocker  [] Reason why Beta-blocker not prescribed / taking: not applicable        CORE MEASURES  DVT prophylaxis: Lovenox  Decubitus ulcer present on admission: No  CODE STATUS: FULL CODE  Nutrition Status: good   Physical therapy: Yes   Old Charts reviewed: Yes  EKG Reviewed: Yes  Advance Directive Addressed: Yes    KARLIE Zhu - CNP, APRN, NP-C  11/7/2024, 11:09 PM

## 2024-11-08 NOTE — PROGRESS NOTES
Physical Therapy  Facility/Department: [facility] MED SURG  Physical Therapy Initial Assessment    Name: Radha Coburn  : 1933  MRN: [id number]  Date of Service: 2024    Discharge Recommendations:  Continue to assess pending progress, Home with assist PRN          Patient Diagnosis(es): The primary encounter diagnosis was Acute on chronic congestive heart failure, unspecified heart failure type (HCC). Diagnoses of Acute respiratory failure with hypoxia, Acute upper respiratory infection, Hyponatremia, and Shortness of breath were also pertinent to this visit.  Past Medical History:  has a past medical history of Allergic rhinitis, Anxiety, Arthritis, DVT (deep venous thrombosis) ([facility]), Essential hypertension, Hyperlipidemia, Hypertensive urgency, Impaired fasting glucose, Osteoarthritis of right hip / Total Right Arthroplasty , Osteoarthritis of right knee / Right TKA , PONV (postoperative nausea and vomiting), Shingles, and Stroke ([facility]).  Past Surgical History:  has a past surgical history that includes Appendectomy; Total hip arthroplasty (Right, 2014); Knee Arthroplasty (Right, ); Breast surgery (Left); eye surgery; joint replacement (Left, 2016); Knee arthroscopy (Left, 2016); and Injection Procedure For Sacroiliac Joint (Right, 10/30/2019).    Assessment  Assessment: Patient is 89 year old female with dx of acute respiratory failure who presents with decreased B LE strength, decreased functional mobility and endurance and decreased safety and balance during transfers and ambulation and would benefit from physical therapy to address all concerns and safely return to [facility].  Treatment Diagnosis: Difficulty walking  Specific Instructions for Next Treatment: Once daily on weekends  Therapy Prognosis: Good  Decision Making: Medium Complexity  Requires PT Follow-Up: Yes  Activity Tolerance  Activity Tolerance: Patient tolerated evaluation without incident;Patient limited by  safely enter her home.  Short Term Goal 4: Patient to tolerate 20-30 min of ther ex/act to improve functional strength and endurance.       Education  Patient Education  Education Given To: Patient  Education Provided: Role of Therapy;Plan of Care;Transfer Training  Education Method: Verbal  Barriers to Learning: None  Education Outcome: Verbalized understanding;Continued education needed      Therapy Time   Individual Concurrent Group Co-treatment   Time In 0720         Time Out 0735         Minutes 15         Timed Code Treatment Minutes: 14 Minutes       Keshawn North, PT, DPT

## 2024-11-08 NOTE — PROGRESS NOTES
Comprehensive Nutrition Assessment    Type and Reason for Visit:  Initial, Positive nutrition screen    Nutrition Recommendations/Plan:   Encourage oral intakes     Malnutrition Assessment:  Malnutrition Status:  At risk for malnutrition (11/08/24 8431)    Context:  Acute Illness     Findings of the 6 clinical characteristics of malnutrition:  Energy Intake:  Mild decrease in energy intake (sounds chronically lower of uncertain quantities)  Weight Loss:  Mild weight loss     Body Fat Loss:  No body fat loss     Muscle Mass Loss:  No muscle mass loss    Fluid Accumulation:  Mild Extremities   Strength:  Not Performed    Nutrition Assessment:    Predicted suboptimal nutrient intakes r/t uncertain etiology, AEB unintened weight losses pta (subsignificant in loss). Relatively stable weight lately but losses from 190's noted. Lower drive for PO and using some Boost at home. Will be starting MOW as well, so question if food accessibility concern as well. Reports regular use of salt tabs at home and no excesses of oral fluids (r/t low serum Na+). Will initiate Ensure (chocolate) to aid with nutrition adequacy.    Nutrition Related Findings:    BLE non pitting edema. active b/s. Wound Type: None       Current Nutrition Intake & Therapies:    Average Meal Intake: %  Average Supplements Intake: None Ordered  ADULT DIET; Regular; Low Sodium (2 gm)  ADULT ORAL NUTRITION SUPPLEMENT; Breakfast, Lunch, Dinner; Standard High Calorie/High Protein Oral Supplement  ADULT ORAL NUTRITION SUPPLEMENT; Breakfast, Lunch, Dinner; Standard High Calorie/High Protein Oral Supplement    Anthropometric Measures:  Height: 170.2 cm (5' 7\")  Ideal Body Weight (IBW): 135 lbs (61 kg)    Admission Body Weight: 83.5 kg (184 lb)  Current Body Weight: 83.5 kg (184 lb 1.6 oz), 136.4 % IBW. Weight Source: Bed scale  Current BMI (kg/m2): 28.8  Usual Body Weight: 83.7 kg (184 lb 8 oz) (just over a week ago but was 195.5# 2 months ago)     % Weight

## 2024-11-08 NOTE — PROGRESS NOTES
& pattern. 12 - 20 bpm  []  Increased RR. Greater than 20 bpm   [x]  SOB w/ exertion or RR greater than 24 bpm []  Access- ory muscle use at rest. Abn.  resp. []  SOB at rest.   2   Bilateral Breath Sounds (BBS) []  Clear []  Diminish-ed bases  [x]  Diminish-ed t/o, or rales   []  Sporadic, scattered wheezes or rhonchi []  Persistentwheezes and, or absent BBS 2   Cough [x]  Strong, effective, & non-prod. []  Effective & prod. Less than 25 ml (2 TBSP) over past 24 hrs []  Ineffective & non-prod to less than 25 ML over past 24 hrs []  Ineffective and, or greater than 25 ml sputum prod. past 24 hrs. []  Nonspon- taneous; Requires suctioning 0   Pulmonary History  (PULM HX) []  No smoking and no chronic pulmonary history []  Former smoker. Quit over 12 mos. ago []  Current smoker or quit w/ in 12 mos []  Pulm. History and, or 20 pk/yr smoking hx [x]  Admitted w/ acute pulm. dx and, or has been admitted w/ pulm. dx 2 or more times over past 12 mos 4   Surgical History this Admit  (SURG HX) [x]  No surgery []  General surgery []  Lower abdominal []  Thoracic or upper abdominal   []  Thoracic w/ pulm. disease 0   Chest X-Ray (CXR)/CT Scan []  Clear or not applicable []  Not available []  Atelectasis or pleural effusions [x]  Localized infiltrate or pulm. edema []  Con-solidated Infiltrates, bilateral, or in more than 1 lobe 3   TOTAL ACUITY: 11       CARE PLAN    If Acuity Level is 2, 3, or 4 in any of the following:    [x] BILATERAL BREATH SOUNDS (BBS)     [x] PULMONARY HISTORY (PULM HX)  [x] Respiratory Rate  (RR)    Goal: Improve respiratory functions in patients with airway disease and decrease WOB    [x] AEROSOL PROTOCOL    Total Acuity:   14-28  []  Secondary Assessment in 24 hrs Total Acuity:  9-13  [x]  Secondary Assessment in 24 hrs Total Acuity:  4-8  []  Secondary Assessment in 24 hrs Total Acuity:  0-3  []  Secondary Assessment in 48 hrs   HHN AEROSOL THERAPY with  [physician-ordered bronchodilator(s)] q 4 &  lung disease.    [] Smoking Cessation Protocol    SMOKING CESSATION EDUCATION provided according to policy RT_201: (corey with an X)  ____Yes    ____ No     ____ NA    Smoking Cessation Booklet given:  ____Yes  ____No ____Patient Refused

## 2024-11-08 NOTE — RT PROTOCOL NOTE
& pattern. 12 - 20 bpm  []  Increased RR. Greater than 20 bpm   [x]  SOB w/ exertion or RR greater than 24 bpm []  Access- ory muscle use at rest. Abn.  resp. []  SOB at rest.   2   Bilateral Breath Sounds (BBS) []  Clear []  Diminish-ed bases  [x]  Diminish-ed t/o, or rales   []  Sporadic, scattered wheezes or rhonchi []  Persistentwheezes and, or absent BBS 2   Cough []  Strong, effective, & non-prod. [x]  Effective & prod. Less than 25 ml (2 TBSP) over past 24 hrs []  Ineffective & non-prod to less than 25 ML over past 24 hrs []  Ineffective and, or greater than 25 ml sputum prod. past 24 hrs. []  Nonspon- taneous; Requires suctioning 1   Pulmonary History  (PULM HX) []  No smoking and no chronic pulmonary history []  Former smoker. Quit over 12 mos. ago []  Current smoker or quit w/ in 12 mos []  Pulm. History and, or 20 pk/yr smoking hx [x]  Admitted w/ acute pulm. dx and, or has been admitted w/ pulm. dx 2 or more times over past 12 mos 4   Surgical History this Admit  (SURG HX) [x]  No surgery []  General surgery []  Lower abdominal []  Thoracic or upper abdominal   []  Thoracic w/ pulm. disease 0   Chest X-Ray (CXR)/CT Scan []  Clear or not applicable []  Not available [x]  Atelectasis or pleural effusions []  Localized infiltrate or pulm. edema []  Con-solidated Infiltrates, bilateral, or in more than 1 lobe 2   TOTAL ACUITY: 11       CARE PLAN    If Acuity Level is 2, 3, or 4 in any of the following:    [x] BILATERAL BREATH SOUNDS (BBS)     [x] PULMONARY HISTORY (PULM HX)  [x] Respiratory Rate  (RR)    Goal: Improve respiratory functions in patients with airway disease and decrease WOB    [x] AEROSOL PROTOCOL    Total Acuity:   14-28  []  Secondary Assessment in 24 hrs Total Acuity:  9-13  [x]  Secondary Assessment in 24 hrs Total Acuity:  4-8  []  Secondary Assessment in 24 hrs Total Acuity:  0-3  []  Secondary Assessment in 48 hrs   HHN AEROSOL THERAPY with  [physician-ordered bronchodilator(s)] q 4 &  lung disease.    [x] Smoking Cessation Protocol    SMOKING CESSATION EDUCATION provided according to policy RT_201: (corey with an X)  ____Yes    x____ No     ____ NA    Smoking Cessation Booklet given:  ____Yes  ____No ____Patient Refused

## 2024-11-08 NOTE — PROGRESS NOTES
Occupational Therapy  Facility/Department: [facility] MED SURG  Occupational Therapy Initial Assessment    Name: Radha Coburn  : 1933  MRN: [id number]  Date of Service: 2024    Discharge Recommendations:  Continue to assess pending progress, Home with assist PRN          Patient Diagnosis(es): The primary encounter diagnosis was Acute on chronic congestive heart failure, unspecified heart failure type (HCC). Diagnoses of Acute respiratory failure with hypoxia, Acute upper respiratory infection, Hyponatremia, and Shortness of breath were also pertinent to this visit.  Past Medical History:  has a past medical history of Allergic rhinitis, Anxiety, Arthritis, DVT (deep venous thrombosis) ([facility]), Essential hypertension, Hyperlipidemia, Hypertensive urgency, Impaired fasting glucose, Osteoarthritis of right hip / Total Right Arthroplasty , Osteoarthritis of right knee / Right TKA , PONV (postoperative nausea and vomiting), Shingles, and Stroke ([facility]).  Past Surgical History:  has a past surgical history that includes Appendectomy; Total hip arthroplasty (Right, 2014); Knee Arthroplasty (Right, ); Breast surgery (Left); eye surgery; joint replacement (Left, 2016); Knee arthroscopy (Left, 2016); and Injection Procedure For Sacroiliac Joint (Right, 10/30/2019).    Treatment Diagnosis: Weakness      Assessment  Performance deficits / Impairments: Decreased functional mobility ;Decreased endurance;Decreased ADL status;Decreased balance;Decreased high-level IADLs  Assessment: 89 y/o F admitted to [facility] for acute respiratory infection. Patient presents with generalized weakness and deconditioning, requiring increased need for assist during ADL.  Treatment Diagnosis: Weakness  Prognosis: Good  Decision Making: Medium Complexity  REQUIRES OT FOLLOW-UP: Yes     Plan  Occupational Therapy Plan  Times Per Day: Once a day  Days Per Week: 7 Days  Current Treatment Recommendations: Strengthening,  Balance training, Functional mobility training, Endurance training, Safety education & training, Patient/Caregiver education & training, Equipment evaluation, education, & procurement, Self-Care / ADL    Restrictions  Restrictions/Precautions  Restrictions/Precautions: General Precautions, Fall Risk    Subjective  General  Patient assessed for rehabilitation services?: Yes     Social/Functional History  Social/Functional History  Lives With: Alone  Type of Home: House  Home Layout: Two level, Able to Live on Main level with bedroom/bathroom  Home Access: Stairs to enter with rails  Entrance Stairs - Number of Steps: 3  Entrance Stairs - Rails: Right  Bathroom Shower/Tub: Tub/Shower unit, Walk-in shower  Bathroom Toilet: Handicap height  Bathroom Equipment: Shower chair, Grab bars in shower  Home Equipment: Cane, Walker - 4-Wheeled  Has the patient had two or more falls in the past year or any fall with injury in the past year?: No  ADL Assistance: Independent  Homemaking Assistance: Needs assistance  Homemaking Responsibilities: Yes  Ambulation Assistance: Independent  Transfer Assistance: Independent  Active : Yes  Additional Comments: Pt reports she receives meals on wheels, uses 4WW for mobility    Objective       Safety Devices  Type of Devices: All niki prominences offloaded;Patient at risk for falls;All fall risk precautions in place;Left in bed;Bed alarm in place;Call light within reach        AROM: Within functional limits  PROM: Within functional limits  Strength: Generally decreased, functional  Coordination: Generally decreased, functional  Tone: Normal  Sensation: Intact  ADL  Feeding: Independent  Grooming: Stand by assistance  UE Bathing: Stand by assistance  LE Bathing: Stand by assistance  UE Dressing: Stand by assistance  LE Dressing: Stand by assistance  Putting On/Taking Off Footwear: Stand by assistance  Toileting: Stand by assistance  Functional Mobility: Stand by assistance  Functional

## 2024-11-08 NOTE — PROGRESS NOTES
Physical Therapy  Facility/Department: [facility] MED SURG  Daily Treatment Note  NAME: Radha Coburn  : 1933  MRN: [id number]    Date of Service: 2024    Discharge Recommendations:  Continue to assess pending progress, Home with assist PRN        Patient Diagnosis(es): The primary encounter diagnosis was Acute on chronic congestive heart failure, unspecified heart failure type (HCC). Diagnoses of Acute respiratory failure with hypoxia, Acute upper respiratory infection, Hyponatremia, and Shortness of breath were also pertinent to this visit.    Assessment  Assessment: Bed mobility--SBA/SUP, transfers--CGA/SBA, gait with RW CGA/SBA 150ft with fluctuating speed, cues for posture and foot clearance. pt tolerated functional mobility well but continued to declined therex despite education d/t wanting to take a nap. Will continue to educate and progress as tolerated.  Activity Tolerance: Patient tolerated treatment well    Plan  Physical Therapy Plan  General Plan: 2 times a day 7 days a week (1x per day on weekends.)  Specific Instructions for Next Treatment: Once daily on weekends  Current Treatment Recommendations: Strengthening;ROM;Balance training;Functional mobility training;Transfer training;Neuromuscular re-education;Stair training;Gait training;Home exercise program;Safety education & training;Patient/Caregiver education & training;Manual;Therapeutic activities;Endurance training    Restrictions  Restrictions/Precautions  Restrictions/Precautions: General Precautions, Fall Risk     Subjective   Subjective  Subjective: pt reports she is tired and wants to take a nap, however is agreeable to complete a walk with encouragement.  Pain: denied  Orientation  Overall Orientation Status: Within Functional Limits  Cognition  Overall Cognitive Status: WFL    Objective  Bed Mobility Training  Bed Mobility Training: Yes  Overall Level of Assistance: Supervision;Stand-by assistance;Assist X1  Interventions:

## 2024-11-08 NOTE — PROGRESS NOTES
Pt resting in bed, denies needs at this time. Vitals and assessment completed. Call light in reach, bed alarm on

## 2024-11-08 NOTE — PLAN OF CARE
Problem: Safety - Adult  Goal: Free from fall injury  Outcome: Progressing  Note: Bed alarm on, bed locked, side rails up, gripper socks on, call light within reach and able to use appropriately. Will continue to monitor this shift.     Problem: Chronic Conditions and Co-morbidities  Goal: Patient's chronic conditions and co-morbidity symptoms are monitored and maintained or improved  Outcome: Progressing  Flowsheets  Taken 11/7/2024 2122  Care Plan - Patient's Chronic Conditions and Co-Morbidity Symptoms are Monitored and Maintained or Improved: Monitor and assess patient's chronic conditions and comorbid symptoms for stability, deterioration, or improvement  Taken 11/7/2024 1841  Care Plan - Patient's Chronic Conditions and Co-Morbidity Symptoms are Monitored and Maintained or Improved: Monitor and assess patient's chronic conditions and comorbid symptoms for stability, deterioration, or improvement     Problem: ABCDS Injury Assessment  Goal: Absence of physical injury  Outcome: Progressing  Note: Patient is encouraged to reposition and assessed for any injuries. Will continue to monitor this shift.     Problem: Skin/Tissue Integrity  Goal: Absence of new skin breakdown  Description: 1.  Monitor for areas of redness and/or skin breakdown  2.  Assess vascular access sites hourly  3.  Every 4-6 hours minimum:  Change oxygen saturation probe site  4.  Every 4-6 hours:  If on nasal continuous positive airway pressure, respiratory therapy assess nares and determine need for appliance change or resting period.  Outcome: Progressing  Note: Patient able to reposition self at this time and use call light appropriately for any assistance. Will continue to monitor this shift

## 2024-11-08 NOTE — PROGRESS NOTES
Spiritual Services Interventions  0310/0310-01   11/8/2024  Giorgio Hill    Radha Coburn  89 y.o. year old female    Encounter Summary  Encounter Overview/Reason: (P) Initial Encounter  Service Provided For: (P) Family  Referral/Consult From: (P) Rounding  Last Encounter : (P) 11/08/24  Complexity of Encounter: (P) Moderate  Begin Time: (P) 1400  End Time : (P) 1415  Total Time Calculated: (P) 15 min     Spiritual/Emotional needs  Type: (P) Spiritual Support                    Assessment/Intervention/Outcome  Assessment: (P) Calm  Intervention: (P) Discussed belief system/Jewish practices/yeni, Discussed illness injury and it’s impact  Outcome: (P) Encouraged, Engaged in conversation

## 2024-11-09 VITALS
BODY MASS INDEX: 29.24 KG/M2 | HEIGHT: 67 IN | TEMPERATURE: 98.6 F | DIASTOLIC BLOOD PRESSURE: 86 MMHG | RESPIRATION RATE: 18 BRPM | WEIGHT: 186.29 LBS | OXYGEN SATURATION: 95 % | SYSTOLIC BLOOD PRESSURE: 158 MMHG | HEART RATE: 76 BPM

## 2024-11-09 LAB
ANION GAP SERPL CALCULATED.3IONS-SCNC: 9 MMOL/L (ref 9–16)
BASOPHILS # BLD: 0.03 K/UL (ref 0–0.2)
BASOPHILS NFR BLD: 0 % (ref 0–2)
BUN SERPL-MCNC: 12 MG/DL (ref 8–23)
BUN/CREAT SERPL: 17 (ref 9–20)
CALCIUM SERPL-MCNC: 8.8 MG/DL (ref 8.6–10.4)
CHLORIDE SERPL-SCNC: 91 MMOL/L (ref 98–107)
CO2 SERPL-SCNC: 27 MMOL/L (ref 20–31)
CREAT SERPL-MCNC: 0.7 MG/DL (ref 0.5–0.9)
CRP SERPL HS-MCNC: 26.1 MG/L (ref 0–5)
EOSINOPHIL # BLD: 0.18 K/UL (ref 0–0.44)
EOSINOPHILS RELATIVE PERCENT: 3 % (ref 1–4)
ERYTHROCYTE [DISTWIDTH] IN BLOOD BY AUTOMATED COUNT: 13.3 % (ref 11.8–14.4)
GFR, ESTIMATED: 83 ML/MIN/1.73M2
GLUCOSE SERPL-MCNC: 127 MG/DL (ref 74–99)
HCT VFR BLD AUTO: 36.2 % (ref 36.3–47.1)
HGB BLD-MCNC: 12.1 G/DL (ref 11.9–15.1)
IMM GRANULOCYTES # BLD AUTO: <0.03 K/UL (ref 0–0.3)
IMM GRANULOCYTES NFR BLD: 0 %
LYMPHOCYTES NFR BLD: 1.42 K/UL (ref 1.1–3.7)
LYMPHOCYTES RELATIVE PERCENT: 20 % (ref 24–43)
MCH RBC QN AUTO: 30.1 PG (ref 25.2–33.5)
MCHC RBC AUTO-ENTMCNC: 33.4 G/DL (ref 28.4–34.8)
MCV RBC AUTO: 90 FL (ref 82.6–102.9)
MICROORGANISM SPEC CULT: ABNORMAL
MICROORGANISM SPEC CULT: NORMAL
MICROORGANISM SPEC CULT: NORMAL
MONOCYTES NFR BLD: 1.04 K/UL (ref 0.1–1.2)
MONOCYTES NFR BLD: 15 % (ref 3–12)
NEUTROPHILS NFR BLD: 62 % (ref 36–65)
NEUTS SEG NFR BLD: 4.32 K/UL (ref 1.5–8.1)
NRBC BLD-RTO: 0 PER 100 WBC
PLATELET # BLD AUTO: 211 K/UL (ref 138–453)
PMV BLD AUTO: 9.6 FL (ref 8.1–13.5)
POTASSIUM SERPL-SCNC: 4.3 MMOL/L (ref 3.7–5.3)
PROCALCITONIN SERPL-MCNC: 0.03 NG/ML (ref 0–0.09)
RBC # BLD AUTO: 4.02 M/UL (ref 3.95–5.11)
SERVICE CMNT-IMP: ABNORMAL
SERVICE CMNT-IMP: NORMAL
SERVICE CMNT-IMP: NORMAL
SODIUM SERPL-SCNC: 127 MMOL/L (ref 136–145)
SPECIMEN DESCRIPTION: ABNORMAL
SPECIMEN DESCRIPTION: NORMAL
SPECIMEN DESCRIPTION: NORMAL
WBC OTHER # BLD: 7 K/UL (ref 3.5–11.3)

## 2024-11-09 PROCEDURE — 6370000000 HC RX 637 (ALT 250 FOR IP)

## 2024-11-09 PROCEDURE — 6370000000 HC RX 637 (ALT 250 FOR IP): Performed by: INTERNAL MEDICINE

## 2024-11-09 PROCEDURE — 97110 THERAPEUTIC EXERCISES: CPT

## 2024-11-09 PROCEDURE — 6370000000 HC RX 637 (ALT 250 FOR IP): Performed by: STUDENT IN AN ORGANIZED HEALTH CARE EDUCATION/TRAINING PROGRAM

## 2024-11-09 PROCEDURE — 80048 BASIC METABOLIC PNL TOTAL CA: CPT

## 2024-11-09 PROCEDURE — 94669 MECHANICAL CHEST WALL OSCILL: CPT

## 2024-11-09 PROCEDURE — 94640 AIRWAY INHALATION TREATMENT: CPT

## 2024-11-09 PROCEDURE — 86140 C-REACTIVE PROTEIN: CPT

## 2024-11-09 PROCEDURE — 85025 COMPLETE CBC W/AUTO DIFF WBC: CPT

## 2024-11-09 PROCEDURE — 84145 PROCALCITONIN (PCT): CPT

## 2024-11-09 PROCEDURE — 94761 N-INVAS EAR/PLS OXIMETRY MLT: CPT

## 2024-11-09 PROCEDURE — 6360000002 HC RX W HCPCS

## 2024-11-09 PROCEDURE — 2580000003 HC RX 258: Performed by: STUDENT IN AN ORGANIZED HEALTH CARE EDUCATION/TRAINING PROGRAM

## 2024-11-09 PROCEDURE — 97116 GAIT TRAINING THERAPY: CPT

## 2024-11-09 PROCEDURE — 97535 SELF CARE MNGMENT TRAINING: CPT

## 2024-11-09 PROCEDURE — 6360000002 HC RX W HCPCS: Performed by: STUDENT IN AN ORGANIZED HEALTH CARE EDUCATION/TRAINING PROGRAM

## 2024-11-09 PROCEDURE — 36415 COLL VENOUS BLD VENIPUNCTURE: CPT

## 2024-11-09 RX ORDER — FUROSEMIDE 40 MG/1
40 TABLET ORAL DAILY
Qty: 30 TABLET | Refills: 3 | Status: SHIPPED | OUTPATIENT
Start: 2024-11-09

## 2024-11-09 RX ORDER — AMLODIPINE BESYLATE 5 MG/1
2.5 TABLET ORAL ONCE
Status: COMPLETED | OUTPATIENT
Start: 2024-11-09 | End: 2024-11-09

## 2024-11-09 RX ORDER — AMLODIPINE BESYLATE 2.5 MG/1
2.5 TABLET ORAL DAILY
Qty: 30 TABLET | Refills: 0 | Status: SHIPPED | OUTPATIENT
Start: 2024-11-09

## 2024-11-09 RX ORDER — CEPHALEXIN 500 MG/1
500 CAPSULE ORAL EVERY 8 HOURS SCHEDULED
Qty: 15 CAPSULE | Refills: 0 | Status: SHIPPED | OUTPATIENT
Start: 2024-11-09 | End: 2024-11-12 | Stop reason: ALTCHOICE

## 2024-11-09 RX ADMIN — GUAIFENESIN AND DEXTROMETHORPHAN 5 ML: 100; 10 SYRUP ORAL at 00:03

## 2024-11-09 RX ADMIN — IPRATROPIUM BROMIDE AND ALBUTEROL SULFATE 1 DOSE: 2.5; .5 SOLUTION RESPIRATORY (INHALATION) at 11:13

## 2024-11-09 RX ADMIN — LOSARTAN POTASSIUM 100 MG: 50 TABLET, FILM COATED ORAL at 08:43

## 2024-11-09 RX ADMIN — PANTOPRAZOLE SODIUM 40 MG: 40 TABLET, DELAYED RELEASE ORAL at 06:33

## 2024-11-09 RX ADMIN — Medication 1 G: at 08:43

## 2024-11-09 RX ADMIN — ONDANSETRON 4 MG: 2 INJECTION, SOLUTION INTRAMUSCULAR; INTRAVENOUS at 06:37

## 2024-11-09 RX ADMIN — CARVEDILOL 25 MG: 25 TABLET, FILM COATED ORAL at 08:43

## 2024-11-09 RX ADMIN — SODIUM CHLORIDE, PRESERVATIVE FREE 10 ML: 5 INJECTION INTRAVENOUS at 08:47

## 2024-11-09 RX ADMIN — AMLODIPINE BESYLATE 2.5 MG: 5 TABLET ORAL at 08:43

## 2024-11-09 RX ADMIN — GUAIFENESIN 600 MG: 600 TABLET, EXTENDED RELEASE ORAL at 08:44

## 2024-11-09 RX ADMIN — IPRATROPIUM BROMIDE AND ALBUTEROL SULFATE 1 DOSE: 2.5; .5 SOLUTION RESPIRATORY (INHALATION) at 05:33

## 2024-11-09 RX ADMIN — CEPHALEXIN 500 MG: 500 CAPSULE ORAL at 05:20

## 2024-11-09 RX ADMIN — FUROSEMIDE 40 MG: 10 INJECTION, SOLUTION INTRAMUSCULAR; INTRAVENOUS at 08:46

## 2024-11-09 NOTE — PROGRESS NOTES
Writer's oxygen dropped to 77%. She does present with apnea like breathing. 2L were applied at this time. Patient's oxygen jumps back up to the mid 90s while awake but dips down into the high 80's and lower while sleeping. Will continue to monitor and assess.

## 2024-11-09 NOTE — DISCHARGE SUMMARY
Discharge Summary    Radha Coburn  :  1933  MRN:  [id number]    Admit date:  2024      Discharge date:   2024    Admitting Physician:  Amandeep Ramos MD    Discharge Diagnoses:      Principal Problem:    Acute respiratory failure with hypoxia  Active Problems:    Essential hypertension /  edema from amlodipine /   Hyponatremia from hydrochlorothiazide    Acute on chronic diastolic CHF (congestive heart failure) ([facility])    Hyponatremia  Resolved Problems:    * No resolved hospital problems. *      Active Hospital Problems    Diagnosis Date Noted    Acute respiratory failure with hypoxia [J96.01] 2024    Acute on chronic diastolic CHF (congestive heart failure) ([facility]) [I50.33] 2024    Hyponatremia [E87.1] 2024    Essential hypertension /  edema from amlodipine /   Hyponatremia from hydrochlorothiazide [I10]        Discharge Medications:         Medication List        START taking these medications      amLODIPine 2.5 MG tablet  Commonly known as: NORVASC  Take 1 tablet by mouth daily     cephALEXin 500 MG capsule  Commonly known as: KEFLEX  Take 1 capsule by mouth every 8 hours for 5 days            CHANGE how you take these medications      aspirin EC 81 MG EC tablet  Take 1 tablet by mouth daily  What changed: when to take this     furosemide 40 MG tablet  Commonly known as: Lasix  Take 1 tablet by mouth daily  What changed:   medication strength  how much to take  when to take this  reasons to take this            CONTINUE taking these medications      albuterol sulfate  (90 Base) MCG/ACT inhaler  Commonly known as: Ventolin HFA  Inhale 2 puffs into the lungs daily as needed for Wheezing or Shortness of Breath (SOB/WHEEZING)     carvedilol 25 MG tablet  Commonly known as: COREG  Take 1 tablet by mouth 2 times daily     cloNIDine 0.3 MG/24HR Ptwk  Commonly known as: Catapres-TTS-3  Place 1 patch onto the skin every 7 days     [facility]

## 2024-11-09 NOTE — PROGRESS NOTES
Occupational Therapy  Facility/Department: [facility] MED SURG  Daily Treatment Note  NAME: Radha Coburn  : 1933  MRN: [id number]    Date of Service: 2024    Discharge Recommendations:  Continue to assess pending progress, Home with assist PRN         Patient Diagnosis(es): The primary encounter diagnosis was Acute on chronic congestive heart failure, unspecified heart failure type (HCC). Diagnoses of Acute respiratory failure with hypoxia, Acute upper respiratory infection, Hyponatremia, Shortness of breath, and Acute on chronic diastolic CHF (congestive heart failure) (HCC) were also pertinent to this visit.     Assessment   Activity Tolerance: Patient tolerated treatment well  Discharge Recommendations: Continue to assess pending progress;Home with assist PRN     Plan  Occupational Therapy Plan  Times Per Day: Once a day  Days Per Week: 7 Days  Current Treatment Recommendations: Strengthening;Balance training;Functional mobility training;Endurance training;Safety education & training;Patient/Caregiver education & training;Equipment evaluation, education, & procurement;Self-Care / ADL    Restrictions  Restrictions/Precautions  Restrictions/Precautions: General Precautions;Fall Risk    Subjective  Subjective  Subjective: Pt sitting up in bed upon arrival. Pt agreed to participate in therapy session.  Pain: Pt had no complaints of pain.            Objective  Vitals          ADL  Toileting: Supervision  Toileting Skilled Clinical Factors: SUP to complete matt care and clothing mgmt.  Functional Mobility: Supervision;Stand by assistance  Functional Mobility Skilled Clinical Factors: SUP/SBA to complete func mob with RW in hallway.  Additional Comments: SUP to complete bed mob and sit to stand transfers.  OT Exercises  Exercise Treatment: Pt completed BUE ther ex x 5 planes x 15 reps x 1 set to increase UE strength and endurance in order to ease completion of ADL tasks. Pt required RBs as needed secondary

## 2024-11-09 NOTE — PROGRESS NOTES
Discharge instructions provided to pt and daughter at this time. Writer provided the heart failure education folder and reviewed with daughter all the contents. Education provided on heart failure medication the patient is currently on along with dosing, side effects, and action of the medications. Pt was educated on daily weights and states she has a digital scale at home. Pt and daughter were educated on the proper way to weigh including timing, attire, same spot wearing the same type of clothing. Pt was educated on fluid restriction of 2 liters per day and educated on what is considered a fluid including jello, ice cream and pudding. Pt and daughter educated on sources of sodium and were provided ways to avoid excess salt such as using frozen or fresh foods as opposed to cold packaged foods. Pt does not smoke or drink alcohol. Pt declines any vaccinations at discharge. Writer did instruct daughter and pt to follow up with Dr. Ramos next week and to get blood work done prior to the visit. They were also instructed to inquire about the possible need for outpt testing for sleep apnea as pt Spo2 was noted to drop into low 80's on room air while sleeping during hospital stay. Pt was discharged to home with daughter via private car at this time. Pt daughter took all her belongings including hearing aids, walker, and cell phone at discharge.

## 2024-11-09 NOTE — PLAN OF CARE
Problem: Safety - Adult  Goal: Free from fall injury  Outcome: Progressing     Problem: Chronic Conditions and Co-morbidities  Goal: Patient's chronic conditions and co-morbidity symptoms are monitored and maintained or improved  Outcome: Progressing     Problem: Discharge Planning  Goal: Discharge to home or other facility with appropriate resources  Outcome: Progressing     Problem: ABCDS Injury Assessment  Goal: Absence of physical injury  Outcome: Progressing     Problem: Skin/Tissue Integrity  Goal: Absence of new skin breakdown  Description: 1.  Monitor for areas of redness and/or skin breakdown  2.  Assess vascular access sites hourly  3.  Every 4-6 hours minimum:  Change oxygen saturation probe site  4.  Every 4-6 hours:  If on nasal continuous positive airway pressure, respiratory therapy assess nares and determine need for appliance change or resting period.  Outcome: Progressing     Problem: Nutrition Deficit:  Goal: Optimize nutritional status  Outcome: Progressing

## 2024-11-09 NOTE — PROGRESS NOTES
Physical Therapy  Facility/Department: [facility] MED SURG  Daily Treatment Note  NAME: Radha Coburn  : 1933  MRN: [id number]    Date of Service: 2024    Discharge Recommendations:  Continue to assess pending progress, Home with assist PRN     Patient Diagnosis(es): The primary encounter diagnosis was Acute on chronic congestive heart failure, unspecified heart failure type (HCC). Diagnoses of Acute respiratory failure with hypoxia, Acute upper respiratory infection, Hyponatremia, Shortness of breath, and Acute on chronic diastolic CHF (congestive heart failure) (HCC) were also pertinent to this visit.    Assessment  Assessment: Bed mobility and Transfers:SUP/SBA. Commode use and transfer with hand hygiene at sink side--SUP. Pt. ambulated 330gpb9 with rollator and SUP/SBA for safety, slow cadance noted with no LOB. Pt. completed seated BLE therex x20 in all planes of motion.  Activity Tolerance: Patient tolerated treatment well    Plan  Physical Therapy Plan  General Plan: 2 times a day 7 days a week  Specific Instructions for Next Treatment: Once daily on weekends  Current Treatment Recommendations: Strengthening;ROM;Balance training;Functional mobility training;Transfer training;Neuromuscular re-education;Stair training;Gait training;Home exercise program;Safety education & training;Patient/Caregiver education & training;Manual;Therapeutic activities;Endurance training    Restrictions  Restrictions/Precautions  Restrictions/Precautions: General Precautions, Fall Risk     Subjective   Subjective  Subjective: Pt. in bed upon arrival, agreeable to therapy at this time  Pain: denies  Orientation  Overall Orientation Status: Within Functional Limits    Objective  Bed Mobility Training  Bed Mobility Training: Yes  Overall Level of Assistance: Supervision;Stand-by assistance;Assist X1  Interventions: Demonstration;Verbal cues  Rolling: Stand-by assistance;Assist X1;Supervision  Supine to Sit: Assist

## 2024-11-09 NOTE — DISCHARGE INSTRUCTIONS
Weigh yourself every day.    Drink no more than 2 L of liquid daily.    Blood test before your visit in 1 week.

## 2024-11-09 NOTE — PROGRESS NOTES
Assessment completed at this time. See assessment flowsheet. Assisted pt to bathroom and back and pt was independent with ambulation and toileting. Changed  probe to nondominant hand per pt request. Pt reports only nausea which is chronic. Zofran given so pt can eat breakfast. Noted crackles in bases of lungs and midway up right side posteriorly. Pt getting Lasix. Call light in reach. Bed alarm activated.

## 2024-11-11 ENCOUNTER — CARE COORDINATION (OUTPATIENT)
Dept: CARE COORDINATION | Age: 89
End: 2024-11-11

## 2024-11-11 LAB
MICROORGANISM SPEC CULT: ABNORMAL
SERVICE CMNT-IMP: ABNORMAL
SPECIMEN DESCRIPTION: ABNORMAL

## 2024-11-11 NOTE — CARE COORDINATION
Care Transitions Note    Initial Call - Call within 2 business days of discharge: Yes    Attempted to reach patient for transitions of care follow up. Unable to reach patient.    Outreach Attempts: # 1  HIPAA compliant voicemail left for patient.     Patient: Radha Coburn    Patient : 1933   MRN: <[id number]>    Reason for Admission: Acute respiratory faikure   Discharge Date: 24  RURS: Readmission Risk Score: 11.2    Last Discharge Facility       Date Complaint Diagnosis Description Type Department Provider    24 Fatigue; Cough; Aphasia Acute on chronic congestive heart failure, unspecified heart failure type (HCC) ... ED to Hosp-Admission (Discharged) (ADMITTED) [facility] Amandeep Ramos MD; MENDEL Davis..            Was this an external facility discharge? No    Follow Up Appointment:   Patient has hospital follow up appointment scheduled within 7 days of discharge.    Future Appointments         Provider Specialty Dept Phone    2024 10:10 AM Amandeep Ramos MD Internal Medicine [phone]    2025 2:30 PM Amandeep Ramos MD Internal Medicine [phone]            Plan for follow-up on next business day.      Socorro Lucia LPN

## 2024-11-12 ENCOUNTER — CARE COORDINATION (OUTPATIENT)
Dept: CARE COORDINATION | Age: 89
End: 2024-11-12

## 2024-11-12 LAB
MICROORGANISM SPEC CULT: NORMAL
MICROORGANISM SPEC CULT: NORMAL
SERVICE CMNT-IMP: NORMAL
SERVICE CMNT-IMP: NORMAL
SPECIMEN DESCRIPTION: NORMAL
SPECIMEN DESCRIPTION: NORMAL

## 2024-11-12 NOTE — CARE COORDINATION
Care Transitions Note    Initial Call - Call within 2 business days of discharge: Yes    Patient Current Location:  Home: [address] [place], [place] [address] Care Coordinator contacted the family, daughter Cindy   by telephone to perform post hospital discharge assessment, verified name and  as identifiers. Provided introduction to self, and explanation of the LPN Care Coordinator role.     Patient: Radha Coburn    Patient : 1933   MRN: <[id number]>    Reason for Admission: chf  Discharge Date: 24  RURS: Readmission Risk Score: 11.2      Last Discharge Facility       Date Complaint Diagnosis Description Type Department Provider    24 Fatigue; Cough; Aphasia Acute on chronic congestive heart failure, unspecified heart failure type (HCC) ... ED to Hosp-Admission (Discharged) (ADMITTED) [facility] Amandeep Ramos MD; MENDEL Davis..            Was this an external facility discharge? No    Additional needs identified to be addressed with provider   No needs identified             Method of communication with provider: chart routing.    Patients top risk factors for readmission: medical condition-HTN urgency     Interventions to address risk factors:   Education: daily weights   Review of patient management of conditions/medications: HTN     Care Summary Note: Writer called to speak Belen but her daughter Cindy HIPAA answered the phone. She stated that they are still having issues with her BP she stated that systolic  number was 186  yesterday she does not remember diastolic number. She stated that they are going to call the doctor today she has a HFU tomorrow. Cindy will be staying with her mom, her other daughter will be arriving from out of town to assist as well. She reports her mom is very hard of hearing. She reports she has not checked her BP today. She reports he legs look great, no more swelling than usual she does wear support hose everyday. She does not know medications but did

## 2024-11-13 PROBLEM — I50.32 CHRONIC DIASTOLIC CHF (CONGESTIVE HEART FAILURE), NYHA CLASS 2 (HCC): Status: ACTIVE | Noted: 2024-11-07

## 2024-11-14 ENCOUNTER — CARE COORDINATION (OUTPATIENT)
Dept: CARE COORDINATION | Age: 89
End: 2024-11-14

## 2024-11-14 NOTE — CARE COORDINATION
.Care Transitions Note    Follow Up Call     Patient Current Location:  Home: [address] [place] [id number]    Care Transition Nurse contacted the patient by telephone. Verified name and  as identifiers.    Additional needs identified to be addressed with provider   No needs identified                 Method of communication with provider: none.    Care Summary Note: Was able to contact Belen for transitional outreach.  She said that she was feeling \"good\".  She denied any chest pain, shortness of breath, swelling, and her cough has decreased.  She said that she saw her PCP yesterday and she does not have to return for 2 month.   She said that she is active.  No further questions or concerns.     Plan of care updates since last contact:  Follow up on PCP appointment       Advance Care Planning:   Does patient have an Advance Directive: reviewed and current.    Medication Review:  No changes since last call.     Remote Patient Monitoring:  Offered patient enrollment in the Remote Patient Monitoring (RPM) program for in-home monitoring: Patient is not eligible for RPM program because: affiliate provider.    Assessments:  Care Transitions Subsequent and Final Call    Subsequent and Final Calls  Do you have any ongoing symptoms?: No  Have your medications changed?: No  Do you have any questions related to your medications?: No  Do you currently have any active services?: No  Do you have any needs or concerns that I can assist you with?: No  Care Transitions Interventions  Other Interventions:              Follow Up Appointment:   Reviewed upcoming appointment(s).  Future Appointments         Provider Specialty Dept Phone    2025 1:20 PM Amandeep Ramos MD Internal Medicine [phone]    2025 2:30 PM Amandeep Ramos MD Internal Medicine [phone]            Care Transition Nurse provided contact information.  Plan for follow-up call in 6-10 days based on severity of symptoms and risk

## 2024-11-21 ENCOUNTER — CARE COORDINATION (OUTPATIENT)
Dept: CARE COORDINATION | Age: 89
End: 2024-11-21

## 2024-11-21 NOTE — CARE COORDINATION
Care Transitions Note    Follow Up Call     Attempted to reach patient for transitions of care follow up.  Unable to reach patient.      Outreach Attempts:   Unable to leave message.     Care Summary Note: 1st attempt    Follow Up Appointment:   Future Appointments         Provider Specialty Dept Phone    1/17/2025 1:20 PM Amandeep Ramos MD Internal Medicine [phone]    4/21/2025 2:30 PM Amandeep Ramos MD Internal Medicine [phone]            Plan for follow-up on next business day.  based on severity of symptoms and risk factors. Plan for next call: symptom management-follow up on respiratory status, CHF s/s    SARAHY FREITAS RN

## 2024-11-22 ENCOUNTER — CARE COORDINATION (OUTPATIENT)
Dept: CARE COORDINATION | Age: 89
End: 2024-11-22

## 2024-11-22 NOTE — CARE COORDINATION
Specialty Dept Phone    1/17/2025 1:20 PM Amandeep Ramos MD Internal Medicine [phone]    4/21/2025 2:30 PM Amandeep Ramos MD Internal Medicine [phone]            Care Transition Nurse provided contact information.  Plan for follow-up call in 11-14 days based on severity of symptoms and risk factors.  Plan for next call: symptom management-follow up on CHF and if doing well can end      SARAHY FREITAS RN

## 2024-11-29 ENCOUNTER — CARE COORDINATION (OUTPATIENT)
Dept: CARE COORDINATION | Age: 88
End: 2024-11-29

## 2024-11-29 NOTE — CARE COORDINATION
Care Transitions Note    Follow Up Call     Attempted to reach patient for transitions of care follow up. Unable to reach patient.    Outreach Attempts:   Female answered the phone and asked Belen if would like to talk with writer.  [place] in back ground that she did not want to take the call.   Was told by female that to call back another day    Patient: Radha Coburn    Patient : 1933   MRN: [id number]    Reason for Admission: CHF  Discharge Date: 24  RURS: Readmission Risk Score: 11.2    Last Discharge Facility       Date Complaint Diagnosis Description Type Department Provider    24 Fatigue; Cough; Aphasia Acute on chronic congestive heart failure, unspecified heart failure type (HCC) ... [facility]; MENDEL Daivs..            Was this an external facility discharge? No    Follow Up Appointment:   Patient does not have a follow up appointment scheduled at time of call.  Pt completed HFU with PCP  Future Appointments         Provider Specialty Dept Phone    2025 1:20 PM Amandeep Ramos MD Internal Medicine [phone]    2025 2:30 PM Amandeep Ramos MD Internal Medicine [phone]            Plan for follow-up call in 6-10 days     SARAHY FREITAS RN

## 2024-12-04 ENCOUNTER — CARE COORDINATION (OUTPATIENT)
Dept: CARE COORDINATION | Age: 88
End: 2024-12-04

## 2024-12-04 NOTE — CARE COORDINATION
Care Transitions Note    Follow Up Call     Patient Current Location:  Home: [address] [place] [id number]    [facility] Care Coordinator contacted the patient by telephone. Verified name and  as identifiers.    Additional needs identified to be addressed with provider   No needs identified                 Method of communication with provider: none.    Care Summary Note: Writer spoke to Belen she stated she I shaving breakfast coffee, banana and chelsy snaps. She stated that she has not been coughing and that she has her BP under control. She did not provide metrics. She denies swelling. She stated that she's doing fine and thanked writer for calling.     Plan of care updates since last contact:  Doing fine s/s chf       Advance Care Planning:   Does patient have an Advance Directive: reviewed during previous call, see note. .    Medication Review:  No changes since last call.     Remote Patient Monitoring:  Offered patient enrollment in the Remote Patient Monitoring (RPM) program for in-home monitoring: Patient is not eligible for RPM program because: affiliate provider.    Assessments:  Care Transitions 24 Hour Call    Do you have any ongoing symptoms?: No  Do you have all of your prescriptions and are they filled?: Yes  Were you discharged with any Home Care or Post Acute Services: No  Care Transitions Interventions          Follow Up Appointment:   Reviewed upcoming appointment(s).  Future Appointments         Provider Specialty Dept Phone    2025 1:20 PM Amandeep Ramos MD Internal Medicine [phone]    2025 2:30 PM Amandeep Ramos MD Internal Medicine [phone]            LPN Care Coordinator provided contact information.  Plan for follow-up call in 6-10 days based on severity of symptoms and risk factors.  Plan for next call: symptom management-s/s chf FC      Socorro Lucia LPN

## 2024-12-10 ENCOUNTER — CARE COORDINATION (OUTPATIENT)
Dept: CARE COORDINATION | Age: 88
End: 2024-12-10

## 2024-12-10 NOTE — CARE COORDINATION
Care Transitions Note    Final Call     Patient Current Location:  Home: [address] [place] [id number]    Care Transition Nurse contacted the patient by telephone. Verified name and  as identifiers.    Patient graduated from the Care Transitions program on 12/10/24.  Patient/family progressing towards self management. .      Advance Care Planning:   Does patient have an Advance Directive: reviewed during previous call, see note. .    Handoff:   Patient was not referred to the ACM team due to no additional needs identified.       Care Summary Note: Was able to contact Belen for final outreach.  She stated that she was doing \"real good\".  She denied any increased shortness of breath, cough, swelling and her appetite was returning.  She thanked the writer for calling.  Informed of final outreach.     Assessments:  Care Transitions Subsequent and Final Call    Subsequent and Final Calls  Do you have any ongoing symptoms?: No  Have your medications changed?: No  Do you have any questions related to your medications?: No  Do you currently have any active services?: No  Do you have any needs or concerns that I can assist you with?: No  Care Transitions Interventions  Other Interventions:              Upcoming Appointments:    Future Appointments         Provider Specialty Dept Phone    2025 1:20 PM Amandeep Ramos MD Internal Medicine [phone]    2025 2:30 PM Amandeep Ramos MD Internal Medicine [phone]            Patient has agreed to contact primary care provider and/or specialist for any further questions, concerns, or needs.    SARAHY FREITAS RN

## 2025-01-30 ENCOUNTER — APPOINTMENT (OUTPATIENT)
Dept: CT IMAGING | Age: 89
End: 2025-01-30
Payer: MEDICARE

## 2025-01-30 ENCOUNTER — APPOINTMENT (OUTPATIENT)
Dept: GENERAL RADIOLOGY | Age: 89
End: 2025-01-30
Payer: MEDICARE

## 2025-01-30 ENCOUNTER — APPOINTMENT (OUTPATIENT)
Dept: CT IMAGING | Age: 89
DRG: 067 | End: 2025-01-30
Payer: MEDICARE

## 2025-01-30 ENCOUNTER — HOSPITAL ENCOUNTER (INPATIENT)
Age: 89
LOS: 3 days | Discharge: HOME HEALTH | DRG: 067 | End: 2025-02-02
Attending: EMERGENCY MEDICINE | Admitting: PSYCHIATRY & NEUROLOGY
Payer: MEDICARE

## 2025-01-30 ENCOUNTER — APPOINTMENT (OUTPATIENT)
Dept: MRI IMAGING | Age: 89
DRG: 067 | End: 2025-01-30
Payer: MEDICARE

## 2025-01-30 ENCOUNTER — HOSPITAL ENCOUNTER (EMERGENCY)
Age: 89
Discharge: ANOTHER HOSPITAL | End: 2025-01-30
Attending: EMERGENCY MEDICINE
Payer: MEDICARE

## 2025-01-30 VITALS
WEIGHT: 170 LBS | TEMPERATURE: 98.9 F | OXYGEN SATURATION: 93 % | SYSTOLIC BLOOD PRESSURE: 219 MMHG | HEART RATE: 73 BPM | HEIGHT: 64 IN | RESPIRATION RATE: 24 BRPM | DIASTOLIC BLOOD PRESSURE: 86 MMHG | BODY MASS INDEX: 29.02 KG/M2

## 2025-01-30 DIAGNOSIS — I63.412 CEREBROVASCULAR ACCIDENT (CVA) DUE TO EMBOLISM OF LEFT MIDDLE CEREBRAL ARTERY (HCC): ICD-10-CM

## 2025-01-30 DIAGNOSIS — I63.9 ISCHEMIC STROKE (HCC): Primary | ICD-10-CM

## 2025-01-30 DIAGNOSIS — I63.9 CEREBROVASCULAR ACCIDENT (CVA), UNSPECIFIED MECHANISM (HCC): Primary | ICD-10-CM

## 2025-01-30 DIAGNOSIS — I48.91 ATRIAL FIBRILLATION, UNSPECIFIED TYPE (HCC): ICD-10-CM

## 2025-01-30 PROBLEM — R47.01 APHASIA: Status: ACTIVE | Noted: 2025-01-30

## 2025-01-30 PROBLEM — I63.512 ACUTE CEREBROVASCULAR ACCIDENT (CVA) DUE TO OCCLUSION OF LEFT MIDDLE CEREBRAL ARTERY (HCC): Status: ACTIVE | Noted: 2025-01-30

## 2025-01-30 LAB
ANION GAP SERPL CALCULATED.3IONS-SCNC: 13 MMOL/L (ref 9–16)
ANION GAP SERPL CALCULATED.3IONS-SCNC: 14 MMOL/L (ref 9–16)
BACTERIA URNS QL MICRO: ABNORMAL
BASOPHILS # BLD: <0.03 K/UL (ref 0–0.2)
BASOPHILS # BLD: <0.03 K/UL (ref 0–0.2)
BASOPHILS NFR BLD: 0 % (ref 0–2)
BASOPHILS NFR BLD: 0 % (ref 0–2)
BILIRUB UR QL STRIP: NEGATIVE
BUN SERPL-MCNC: 10 MG/DL (ref 8–23)
BUN SERPL-MCNC: 9 MG/DL (ref 8–23)
BUN/CREAT SERPL: 20 (ref 9–20)
CALCIUM SERPL-MCNC: 9.5 MG/DL (ref 8.6–10.4)
CALCIUM SERPL-MCNC: 9.7 MG/DL (ref 8.6–10.4)
CHLORIDE SERPL-SCNC: 89 MMOL/L (ref 98–107)
CHLORIDE SERPL-SCNC: 89 MMOL/L (ref 98–107)
CK SERPL-CCNC: 40 U/L (ref 26–192)
CLARITY UR: CLEAR
CO2 SERPL-SCNC: 22 MMOL/L (ref 20–31)
CO2 SERPL-SCNC: 24 MMOL/L (ref 20–31)
COLOR UR: YELLOW
CREAT SERPL-MCNC: 0.5 MG/DL (ref 0.5–0.9)
CREAT SERPL-MCNC: 0.6 MG/DL (ref 0.6–0.9)
EOSINOPHIL # BLD: 0.1 K/UL (ref 0–0.44)
EOSINOPHIL # BLD: 0.11 K/UL (ref 0–0.44)
EOSINOPHILS RELATIVE PERCENT: 1 % (ref 1–4)
EOSINOPHILS RELATIVE PERCENT: 1 % (ref 1–4)
EPI CELLS #/AREA URNS HPF: ABNORMAL /HPF (ref 0–25)
ERYTHROCYTE [DISTWIDTH] IN BLOOD BY AUTOMATED COUNT: 13.2 % (ref 11.8–14.4)
ERYTHROCYTE [DISTWIDTH] IN BLOOD BY AUTOMATED COUNT: 13.3 % (ref 11.8–14.4)
GFR, ESTIMATED: 85 ML/MIN/1.73M2
GFR, ESTIMATED: 88 ML/MIN/1.73M2
GLUCOSE BLD-MCNC: 133 MG/DL (ref 74–100)
GLUCOSE SERPL-MCNC: 123 MG/DL (ref 74–99)
GLUCOSE SERPL-MCNC: 134 MG/DL (ref 74–99)
GLUCOSE UR STRIP-MCNC: NEGATIVE MG/DL
HCT VFR BLD AUTO: 41.7 % (ref 36.3–47.1)
HCT VFR BLD AUTO: 44.3 % (ref 36.3–47.1)
HGB BLD-MCNC: 13.7 G/DL (ref 11.9–15.1)
HGB BLD-MCNC: 15 G/DL (ref 11.9–15.1)
HGB UR QL STRIP.AUTO: NEGATIVE
IMM GRANULOCYTES # BLD AUTO: <0.03 K/UL (ref 0–0.3)
IMM GRANULOCYTES # BLD AUTO: <0.03 K/UL (ref 0–0.3)
IMM GRANULOCYTES NFR BLD: 0 %
IMM GRANULOCYTES NFR BLD: 0 %
INR PPP: 1.1
INR PPP: 1.1
KETONES UR STRIP-MCNC: ABNORMAL MG/DL
LEUKOCYTE ESTERASE UR QL STRIP: NEGATIVE
LYMPHOCYTES NFR BLD: 1.27 K/UL (ref 1.1–3.7)
LYMPHOCYTES NFR BLD: 1.38 K/UL (ref 1.1–3.7)
LYMPHOCYTES RELATIVE PERCENT: 16 % (ref 24–43)
LYMPHOCYTES RELATIVE PERCENT: 17 % (ref 24–43)
MCH RBC QN AUTO: 29 PG (ref 25.2–33.5)
MCH RBC QN AUTO: 29.2 PG (ref 25.2–33.5)
MCHC RBC AUTO-ENTMCNC: 32.9 G/DL (ref 28.4–34.8)
MCHC RBC AUTO-ENTMCNC: 33.9 G/DL (ref 28.4–34.8)
MCV RBC AUTO: 86.4 FL (ref 82.6–102.9)
MCV RBC AUTO: 88.2 FL (ref 82.6–102.9)
MONOCYTES NFR BLD: 0.79 K/UL (ref 0.1–1.2)
MONOCYTES NFR BLD: 0.83 K/UL (ref 0.1–1.2)
MONOCYTES NFR BLD: 10 % (ref 3–12)
MONOCYTES NFR BLD: 10 % (ref 3–12)
MYOGLOBIN SERPL-MCNC: 37 NG/ML (ref 25–58)
NEUTROPHILS NFR BLD: 72 % (ref 36–65)
NEUTROPHILS NFR BLD: 73 % (ref 36–65)
NEUTS SEG NFR BLD: 5.7 K/UL (ref 1.5–8.1)
NEUTS SEG NFR BLD: 5.93 K/UL (ref 1.5–8.1)
NITRITE UR QL STRIP: NEGATIVE
NRBC BLD-RTO: 0 PER 100 WBC
NRBC BLD-RTO: 0 PER 100 WBC
PARTIAL THROMBOPLASTIN TIME: 33.7 SEC (ref 23–36.5)
PARTIAL THROMBOPLASTIN TIME: 35.1 SEC (ref 26.8–34.8)
PH UR STRIP: 7 [PH] (ref 5–9)
PLATELET # BLD AUTO: 239 K/UL (ref 138–453)
PLATELET # BLD AUTO: 255 K/UL (ref 138–453)
PMV BLD AUTO: 10.1 FL (ref 8.1–13.5)
PMV BLD AUTO: 10.1 FL (ref 8.1–13.5)
POTASSIUM SERPL-SCNC: 3.7 MMOL/L (ref 3.7–5.3)
POTASSIUM SERPL-SCNC: 3.7 MMOL/L (ref 3.7–5.3)
PROT UR STRIP-MCNC: NEGATIVE MG/DL
PROTHROMBIN TIME: 13.5 SEC (ref 11.7–14.1)
PROTHROMBIN TIME: 13.9 SEC (ref 11.7–14.9)
RBC # BLD AUTO: 4.73 M/UL (ref 3.95–5.11)
RBC # BLD AUTO: 5.13 M/UL (ref 3.95–5.11)
RBC #/AREA URNS HPF: ABNORMAL /HPF (ref 0–2)
SODIUM SERPL-SCNC: 125 MMOL/L (ref 136–145)
SODIUM SERPL-SCNC: 126 MMOL/L (ref 136–145)
SP GR UR STRIP: 1.01 (ref 1.01–1.02)
TROPONIN I SERPL HS-MCNC: 16 NG/L (ref 0–14)
UROBILINOGEN UR STRIP-ACNC: NORMAL EU/DL (ref 0–1)
WBC #/AREA URNS HPF: ABNORMAL /HPF (ref 0–5)
WBC OTHER # BLD: 8 K/UL (ref 3.5–11.3)
WBC OTHER # BLD: 8.2 K/UL (ref 3.5–11.3)

## 2025-01-30 PROCEDURE — 99223 1ST HOSP IP/OBS HIGH 75: CPT | Performed by: PSYCHIATRY & NEUROLOGY

## 2025-01-30 PROCEDURE — 99285 EMERGENCY DEPT VISIT HI MDM: CPT

## 2025-01-30 PROCEDURE — 6360000002 HC RX W HCPCS

## 2025-01-30 PROCEDURE — 80048 BASIC METABOLIC PNL TOTAL CA: CPT

## 2025-01-30 PROCEDURE — 85610 PROTHROMBIN TIME: CPT

## 2025-01-30 PROCEDURE — 82550 ASSAY OF CK (CPK): CPT

## 2025-01-30 PROCEDURE — 84484 ASSAY OF TROPONIN QUANT: CPT

## 2025-01-30 PROCEDURE — 93005 ELECTROCARDIOGRAM TRACING: CPT | Performed by: PSYCHIATRY & NEUROLOGY

## 2025-01-30 PROCEDURE — 85025 COMPLETE CBC W/AUTO DIFF WBC: CPT

## 2025-01-30 PROCEDURE — 70450 CT HEAD/BRAIN W/O DYE: CPT

## 2025-01-30 PROCEDURE — 6360000004 HC RX CONTRAST MEDICATION: Performed by: EMERGENCY MEDICINE

## 2025-01-30 PROCEDURE — 93005 ELECTROCARDIOGRAM TRACING: CPT | Performed by: EMERGENCY MEDICINE

## 2025-01-30 PROCEDURE — 70498 CT ANGIOGRAPHY NECK: CPT

## 2025-01-30 PROCEDURE — 70551 MRI BRAIN STEM W/O DYE: CPT

## 2025-01-30 PROCEDURE — 83874 ASSAY OF MYOGLOBIN: CPT

## 2025-01-30 PROCEDURE — 85730 THROMBOPLASTIN TIME PARTIAL: CPT

## 2025-01-30 PROCEDURE — 2060000000 HC ICU INTERMEDIATE R&B

## 2025-01-30 PROCEDURE — 82947 ASSAY GLUCOSE BLOOD QUANT: CPT

## 2025-01-30 PROCEDURE — 82553 CREATINE MB FRACTION: CPT

## 2025-01-30 PROCEDURE — 71045 X-RAY EXAM CHEST 1 VIEW: CPT

## 2025-01-30 PROCEDURE — 6360000004 HC RX CONTRAST MEDICATION

## 2025-01-30 PROCEDURE — 0042T CT BRAIN PERFUSION: CPT

## 2025-01-30 PROCEDURE — 6360000002 HC RX W HCPCS: Performed by: EMERGENCY MEDICINE

## 2025-01-30 PROCEDURE — 6370000000 HC RX 637 (ALT 250 FOR IP)

## 2025-01-30 PROCEDURE — 81001 URINALYSIS AUTO W/SCOPE: CPT

## 2025-01-30 RX ORDER — CLOPIDOGREL 300 MG/1
300 TABLET, FILM COATED ORAL ONCE
Status: DISCONTINUED | OUTPATIENT
Start: 2025-01-30 | End: 2025-01-30

## 2025-01-30 RX ORDER — IOPAMIDOL 755 MG/ML
50 INJECTION, SOLUTION INTRAVASCULAR
Status: COMPLETED | OUTPATIENT
Start: 2025-01-30 | End: 2025-01-30

## 2025-01-30 RX ORDER — LABETALOL HYDROCHLORIDE 5 MG/ML
10 INJECTION, SOLUTION INTRAVENOUS ONCE
Status: COMPLETED | OUTPATIENT
Start: 2025-01-30 | End: 2025-01-30

## 2025-01-30 RX ORDER — SODIUM CHLORIDE 0.9 % (FLUSH) 0.9 %
5-40 SYRINGE (ML) INJECTION EVERY 12 HOURS SCHEDULED
Status: DISCONTINUED | OUTPATIENT
Start: 2025-01-30 | End: 2025-02-02 | Stop reason: HOSPADM

## 2025-01-30 RX ORDER — ONDANSETRON 4 MG/1
4 TABLET, ORALLY DISINTEGRATING ORAL EVERY 8 HOURS PRN
Status: DISCONTINUED | OUTPATIENT
Start: 2025-01-30 | End: 2025-02-02 | Stop reason: HOSPADM

## 2025-01-30 RX ORDER — ASPIRIN 81 MG/1
324 TABLET, CHEWABLE ORAL ONCE
Status: DISCONTINUED | OUTPATIENT
Start: 2025-01-30 | End: 2025-01-30

## 2025-01-30 RX ORDER — POLYETHYLENE GLYCOL 3350 17 G/17G
17 POWDER, FOR SOLUTION ORAL DAILY PRN
Status: DISCONTINUED | OUTPATIENT
Start: 2025-01-30 | End: 2025-02-02 | Stop reason: HOSPADM

## 2025-01-30 RX ORDER — ASPIRIN 81 MG/1
81 TABLET, CHEWABLE ORAL DAILY
Status: DISCONTINUED | OUTPATIENT
Start: 2025-01-31 | End: 2025-02-02 | Stop reason: HOSPADM

## 2025-01-30 RX ORDER — ASPIRIN 300 MG/1
300 SUPPOSITORY RECTAL ONCE
Status: COMPLETED | OUTPATIENT
Start: 2025-01-30 | End: 2025-01-30

## 2025-01-30 RX ORDER — SODIUM CHLORIDE 9 MG/ML
INJECTION, SOLUTION INTRAVENOUS PRN
Status: DISCONTINUED | OUTPATIENT
Start: 2025-01-30 | End: 2025-02-02 | Stop reason: HOSPADM

## 2025-01-30 RX ORDER — IOPAMIDOL 755 MG/ML
75 INJECTION, SOLUTION INTRAVASCULAR
Status: COMPLETED | OUTPATIENT
Start: 2025-01-30 | End: 2025-01-30

## 2025-01-30 RX ORDER — CLOPIDOGREL BISULFATE 75 MG/1
300 TABLET ORAL ONCE
Status: DISCONTINUED | OUTPATIENT
Start: 2025-01-30 | End: 2025-01-30

## 2025-01-30 RX ORDER — ASPIRIN 300 MG/1
SUPPOSITORY RECTAL
Status: COMPLETED
Start: 2025-01-30 | End: 2025-01-30

## 2025-01-30 RX ORDER — LABETALOL HYDROCHLORIDE 5 MG/ML
10 INJECTION, SOLUTION INTRAVENOUS EVERY 4 HOURS PRN
Status: DISCONTINUED | OUTPATIENT
Start: 2025-01-30 | End: 2025-01-31

## 2025-01-30 RX ORDER — ENOXAPARIN SODIUM 100 MG/ML
40 INJECTION SUBCUTANEOUS DAILY
Status: DISCONTINUED | OUTPATIENT
Start: 2025-01-31 | End: 2025-01-30

## 2025-01-30 RX ORDER — CLOPIDOGREL BISULFATE 75 MG/1
75 TABLET ORAL DAILY
Status: DISCONTINUED | OUTPATIENT
Start: 2025-01-31 | End: 2025-01-31

## 2025-01-30 RX ORDER — HYDRALAZINE HYDROCHLORIDE 20 MG/ML
10 INJECTION INTRAMUSCULAR; INTRAVENOUS EVERY 6 HOURS PRN
Status: DISCONTINUED | OUTPATIENT
Start: 2025-01-30 | End: 2025-01-31

## 2025-01-30 RX ORDER — ROSUVASTATIN CALCIUM 20 MG/1
40 TABLET, COATED ORAL NIGHTLY
Status: DISCONTINUED | OUTPATIENT
Start: 2025-01-30 | End: 2025-02-02 | Stop reason: HOSPADM

## 2025-01-30 RX ORDER — ONDANSETRON 2 MG/ML
4 INJECTION INTRAMUSCULAR; INTRAVENOUS EVERY 6 HOURS PRN
Status: DISCONTINUED | OUTPATIENT
Start: 2025-01-30 | End: 2025-02-02 | Stop reason: HOSPADM

## 2025-01-30 RX ORDER — SODIUM CHLORIDE 0.9 % (FLUSH) 0.9 %
5-40 SYRINGE (ML) INJECTION PRN
Status: DISCONTINUED | OUTPATIENT
Start: 2025-01-30 | End: 2025-02-02 | Stop reason: HOSPADM

## 2025-01-30 RX ADMIN — IOPAMIDOL 75 ML: 755 INJECTION, SOLUTION INTRAVENOUS at 14:25

## 2025-01-30 RX ADMIN — LABETALOL HYDROCHLORIDE 10 MG: 5 INJECTION INTRAVENOUS at 14:48

## 2025-01-30 RX ADMIN — LABETALOL HYDROCHLORIDE 10 MG: 5 INJECTION INTRAVENOUS at 15:20

## 2025-01-30 RX ADMIN — IOPAMIDOL 50 ML: 755 INJECTION, SOLUTION INTRAVENOUS at 17:02

## 2025-01-30 RX ADMIN — ASPIRIN 300 MG: 300 SUPPOSITORY RECTAL at 15:18

## 2025-01-30 RX ADMIN — HYDRALAZINE HYDROCHLORIDE 10 MG: 20 INJECTION INTRAMUSCULAR; INTRAVENOUS at 23:41

## 2025-01-30 ASSESSMENT — PAIN SCALES - WONG BAKER
WONGBAKER_NUMERICALRESPONSE: NO HURT

## 2025-01-30 ASSESSMENT — PAIN - FUNCTIONAL ASSESSMENT
PAIN_FUNCTIONAL_ASSESSMENT: NONE - DENIES PAIN
PAIN_FUNCTIONAL_ASSESSMENT: WONG-BAKER FACES

## 2025-01-30 NOTE — H&P
[facility] Neurology   IN-PATIENT SERVICE   [facility]    HISTORY AND PHYSICAL EXAMINATION            Date:   1/30/2025  Patient name:  Radha Coburn  Date of admission:  1/30/2025  4:37 PM  MRN:   [id number]  Account:  [id number]  YOB: 1933  PCP:    Amandeep Ramos MD  Room:   27/27  Code Status:    Prior    Chief Complaint:     Chief Complaint   Patient presents with    Cerebrovascular Accident       History Obtained From:    electronic medical record    History of Present Illness:     Radha Coburn is a 89 y.o. right handed female with a history of essential hypertension, chronic diastolic CHF NYHA class II, DVT, anxiety presents to [facility] for aphasia.     Patient was last known well at 10 PM on 1/29/2025.  This morning, patient's family noted that she had difficulty speaking.  They checked on her at 1 PM and was noted to have significant difficulty with speech production, and she was transported to [facility].  She underwent CT and CTA at that time, and showed left M3 occlusion.  CT head without IV contrast showed wedge-shaped hypodensity in left frontal lobe.     She was transferred to [facility] for further care.  She was seen and examined at bedside.  She had global aphasia with severe dysarthria.  NIHSS12.  She does not follow verbal commands, however will follow some mimicking actions (such as closing eyes or waving).  She is not able to follow complex commands.      CT perfusion was done and shows no mismatch, however this could be confounded by her heart failure.  In addition, she was found to have atrial fibrillation while at [facility].       LKW: 10 PM on 1/29/2025  NIHSS: 12  Modified Luna Scale: 0  SBP: 152/108  Glucose: 127  CT head without contrast: Left frontal hypodensity.  TNK: Not indicated due to last known well greater than 4.5 hours.  Endovascular: No intervention planned.        Past Medical History:     Past Medical  taking: Reported on 2024    Bryn Ortiz MD   sodium chloride 1 g tablet Take 1 tablet by mouth in the morning and at bedtime 10/29/24   Amandeep Ramos MD   ondansetron (ZOFRAN-ODT) 4 MG disintegrating tablet Take 1 tablet by mouth 3 times daily as needed for Nausea or Vomiting 10/11/24   Amandeep Ramos MD   albuterol sulfate HFA (VENTOLIN HFA) 108 (90 Base) MCG/ACT inhaler Inhale 2 puffs into the lungs daily as needed for Wheezing or Shortness of Breath (SOB/WHEEZING) 10/1/24   Amandeep Ramos MD   cloNIDine (CATAPRES-TTS-3) 0.3 MG/24HR PTWK Place 1 patch onto the skin every 7 days 4/15/24 4/15/25  Amandeep Ramos MD   Handicap Placard MISC by Does not apply route Duration;4 years 23   Amandeep Ramos MD   omeprazole (PRILOSEC) 20 MG delayed release capsule Take 1 capsule by mouth Daily 22   Bryn Ortiz MD   escitalopram (LEXAPRO) 10 MG tablet Take 1 tablet by mouth daily  Patient taking differently: Take 1 tablet by mouth daily Takes as needed 10/15/19   Amandeep Ramos MD   aspirin EC 81 MG EC tablet Take 1 tablet by mouth daily  Patient taking differently: Take 1 tablet by mouth nightly 3/22/16   Amandeep Ramos MD        Allergies:     Percocet [oxycodone-acetaminophen]    Social History:     Tobacco:    reports that she has never smoked. She has never used smokeless tobacco.  Alcohol:      reports no history of alcohol use.  Drug Use:  reports no history of drug use.    Family History:     Family History   Problem Relation Age of Onset    Heart Disease Mother     Heart Disease Father     Heart Disease Brother     Heart Disease Sister     Cancer Brother     Heart Disease Sister     Cancer Sister        Review of Systems:     ROS unable to be obtained secondary to patient's aphasia.      Physical Exam:   BP (!) 216/109   Pulse 77   Temp 98.6 °F (37 °C) (Oral)   Resp 22   Ht 1.702 m (5' 7\")   SpO2 94%   BMI 28.66 kg/m²   Temp (24hrs), Av.6

## 2025-01-30 NOTE — ED NOTES
[facility] called with telestroke doc from [facility] on line to speak to Dr. Estrada.

## 2025-01-30 NOTE — ED PROVIDER NOTES
[facility] EMERGENCY DEPARTMENT  Emergency Department Encounter  Emergency Medicine Resident     Pt Name: Radha Coburn  MRN: [id number]  Birthdate 11/4/1933  Date of evaluation: 1/30/25  PCP:  Amandeep Ramos MD    Chief Complaint     Chief Complaint   Patient presents with    Cerebrovascular Accident       History of present illness (HPI)  (Location/Symptom, Timing/Onset, Context/Setting, Quality, Duration, Modifying Factors, Severity.)      Radha Coburn is a 89 y.o. female who presented to the emergency department as transfer from outside hospital.  Left MCA occlusion.  Unknown last known well time.  Patient aphasic, unable to cooperate in review of system testing.    Past medical / surgical/ social/ family history      Past Medical Hx:      has a past medical history of Allergic rhinitis, Anxiety, Arthritis, DVT (deep venous thrombosis) (HCC), Essential hypertension, Hyperlipidemia, Hypertensive urgency, Impaired fasting glucose, Osteoarthritis of right hip / Total Right Arthroplasty 2014, Osteoarthritis of right knee / Right TKA 2014, PONV (postoperative nausea and vomiting), Shingles, and Stroke (HCC).    Past Surgical Hx:     has a past surgical history that includes Appendectomy; Total hip arthroplasty (Right, 2-); Knee Arthroplasty (Right, 2014); Breast surgery (Left); eye surgery; joint replacement (Left, 1/11/2016); Knee arthroscopy (Left, 05/16/2016); and Injection Procedure For Sacroiliac Joint (Right, 10/30/2019).    Social Hx:  Social History     Socioeconomic History    Marital status:      Spouse name: Not on file    Number of children: Not on file    Years of education: Not on file    Highest education level: Not on file   Occupational History    Not on file   Tobacco Use    Smoking status: Never    Smokeless tobacco: Never   Vaping Use    Vaping status: Never Used   Substance and Sexual Activity    Alcohol use: No    Drug use: No    Sexual activity: Not Currently

## 2025-01-30 NOTE — ED NOTES
Pt to ED w/ c/o stroke like symptoms.   Pt EMS transfer from [place].   Pt was in NSR en route, had EKG changes and in rate controled afib.   Pt has left sided gaze, garbled speech.   Pt resting in cot.   Neuro at bedside

## 2025-01-30 NOTE — ED PROVIDER NOTES
[facility]  Emergency Department  Faculty Attestation     I performed a history and physical examination of the patient and discussed management with the resident. I reviewed the resident’s note and agree with the documented findings and plan of care. Any areas of disagreement are noted on the chart. I was personally present for the key portions of any procedures. I have documented in the chart those procedures where I was not present during the key portions. I have reviewed the emergency nurses triage note. I agree with the chief complaint, past medical history, past surgical history, allergies, medications, social and family history as documented unless otherwise noted below.    For Physician Assistant/ Nurse Practitioner cases/documentation I have personally evaluated this patient and have completed at least one if not all key elements of the E/M (history, physical exam, and MDM). Additional findings are as noted.    Preliminary note started at 4:57 PM EST    Primary Care Physician:  Amandeep Ramos MD    Screenings:  [unfilled]    CHIEF COMPLAINT       Chief Complaint   Patient presents with    Cerebrovascular Accident       RECENT VITALS:   BP (!) 152/108   Pulse 78   Resp 16   SpO2 94%     LABS:  Labs Reviewed   STROKE PANEL       Radiology  CT BRAIN PERFUSION    (Results Pending)       CRITICAL CARE: There was a high probability of clinically significant/life threatening deterioration in this patient's condition which required my urgent intervention.  Total critical care time was 10 minutes.  This excludes any time for separately reportable procedures.     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  Her last known well was 10 PM on 1/29/2025.  NIH stroke score is 7.  No TNK due to delay in presentation.  On exam here unable to obtain history.  She is mildly hypertensive but other vital signs are normal.  No signs of trauma.  She has difficulty following commands.  Plan is review labs and imaging, cardiac monitor, anticipate admission.            Thad Barrera MD, FACEP  Attending Emergency  Physician                Thad Barrera MD  01/30/25 1700

## 2025-01-30 NOTE — CONSULTS
Endovascular Neurosurgery Consult      Reason for evaluation: Left M3 occlusion    SUBJECTIVE:   History of Chief Complaint:      Radha Coburn is a 89 y.o. right handed female with a history of essential hypertension, chronic diastolic CHF NYHA class II, DVT, anxiety presents to [facility] for aphasia.     Patient was last known well at 10 PM on 1/29/2025.  This morning, patient's family noted that she had difficulty speaking.  They checked on her at 1 PM and was noted to have significant difficulty with speech production, and she was transported to [facility].  She underwent CT and CTA at that time, and showed left M3 occlusion.  CT head without IV contrast showed wedge-shaped hypodensity in left frontal lobe.     She was transferred to [facility] for further care.  She was seen and examined at bedside.  She had global aphasia with severe dysarthria.  NIHSS12.  She does not follow verbal commands, however will follow some mimicking actions (such as closing eyes or waving).  She is not able to follow complex commands.      CT perfusion was done and shows no mismatch, however this could be confounded by her heart failure.  In addition, she was found to have atrial fibrillation while at [place].       LKW: 10 PM on 1/29/2025  NIHSS: 12  Modified Luna Scale: 0  SBP: 152/108  Glucose: 127  CT head without contrast: Left frontal hypodensity.  TNK: Not indicated due to last known well greater than 4.5 hours.  Endovascular: No intervention planned.    Allergies  is allergic to percocet [oxycodone-acetaminophen].  Medications  Prior to Admission medications    Medication Sig Start Date End Date Taking? Authorizing Provider   olmesartan (BENICAR) 40 MG tablet Take 1 tablet by mouth daily 12/13/24   Amandeep Ramos MD   hydrocortisone (PROCTO-MED HC) 2.5 % CREA rectal cream Use TID prn for hemorroids 12/13/24   Amandeep Ramos MD   carvedilol (COREG) 25 MG tablet Take 1 tablet by mouth 2

## 2025-01-30 NOTE — CONSULTS
Stroke Neurology Consult Note  Stroke Alert @ 4:21 PM on 1/30/2025  Arrival at bedside @4:22 PM    Reason for Consult:  ED Stroke Alert  Requesting Physician:  ED team   Endovascular Neurosurgeon: Crow Rolle MD  Stroke Team: Girma Gallegos MD  History Obtained From:  patient, electronic medical record  Chief Complaint:  Acute neurological deficits   Allergies:  Percocet [oxycodone-acetaminophen]    History of Presenting Illness     Radha Coburn is a 89 y.o. right handed female with a history of essential hypertension, chronic diastolic CHF NYHA class II, DVT, anxiety presents to [facility] for aphasia.    Patient was last known well at 10 PM on 1/29/2025.  This morning, patient's family noted that she had difficulty speaking.  They checked on her at 1 PM and was noted to have significant difficulty with speech production, and she was transported to [place] [facility].  She underwent CT and CTA at that time, and showed left M3 occlusion.  CT head without IV contrast showed wedge-shaped hypodensity in left frontal lobe.    She was transferred to [facility] for further care.  She was seen and examined at bedside.  She had global aphasia with severe dysarthria.  NIHSS12.  She does not follow verbal commands, however will follow some mimicking actions (such as closing eyes or waving).  She is not able to follow complex commands.     CT perfusion was done and shows no mismatch, however this could be confounded by her heart failure.  In addition, she was found to have atrial fibrillation while at [place].      LKW: 10 PM on 1/29/2025  NIHSS: 12  Modified Luna Scale: 0  SBP: 152/108  Glucose: 127  CT head without contrast: Left frontal hypodensity.  TNK: Not indicated due to last known well greater than 4.5 hours.  Endovascular: No intervention planned.    Past Histories          Diagnosis Date    Allergic rhinitis     Anxiety     Arthritis     DVT (deep venous thrombosis) ([facility]) 01/01/1970    rt leg     arm:  0 - no drift, limb holds 90 (or 45) degrees for full 10 seconds  5b.  Motor right arm:  0 - no drift, limb holds 90 (or 45) degrees for full 10 seconds  6a.  Motor left le - no drift; leg holds 30 degree position for full 5 seconds  6b.  Motor right le - no drift; leg holds 30 degree position for full 5 seconds  7.    Limb Ataxia:  0 - absent  8.    Sensory:  0 - normal; no sensory loss  9.    Best Language:  3 - mute, global aphasia; no usable speech or auditory comprehension  10.  Dysarthria:  2 - severe; patient speech is so slurred as to be unintelligible in the absence of or our of proportion to any dysphagia, or is mute/anarthric   11.  Extinction and Inattention:  1 - visual, tactile, auditory, spatial or personal inattention or extinction to bilateral simultaneous stimulation in one of the sensory modalities     Total:  12   Modified [place] Score Scale (mRS):   Pre-admission mRS: 0: No symptoms at all   Current mRS: 3: Moderate disability; requiring some help, but able to walk without assistance      Last Known Well (date and time)   10 PM on 2025   Candidate for IV Tenecteplase therapy    Yes []  Risks including 6% of sich/death, benefits of potential improved thrombolysis, and alternatives to IV thrombolytics discussed with patient and/or family.  N/A  N/A  No   [] due to the following exclusion criteria: Last well more than 4.5 hrs   Candidate for Thrombectomy   Yes []    No  [x] due to the following exclusion criteria: Age, comorbidities, distal occlusion       Assessment & Plan       Radha Coburn is a 89 y.o. right handed female with a medical history as noted above who presents for evaluation of aphasia.  Patient's last known well was 10 PM on 2025.  She eventually presented to [facility] where she was found to have left M3 occlusion.  CT perfusion was done after she was transported to [facility] which showed no evidence of mismatch, however this could be

## 2025-01-30 NOTE — ED NOTES
Daughter who is out of town is reporting that she spoke with the patient last night at 2200 and she seemed ok. Today family called patient at 1300 and patient answered the phone but was unable to speak with them.

## 2025-01-30 NOTE — ED NOTES
Daughter at bedside. Patient recognizes family and attempted to speak with them but no comprehensible words produced.

## 2025-01-30 NOTE — PROGRESS NOTES
[facility] [place]    PATIENT NAME: Radha Coburn    Shift date: 2025  Shift day: Thursday   Shift # 2    Room #    Name: Radha Coburn            Age: 89 y.o.  Gender: female          Sikhism: Gnosticism   Place of [place]ism: Unknown    Trauma/Incident type:  Stroke Alert  Admit Date & Time: 2025  4:37 PM  TRAUMA NAME: Radha Coburn    ADVANCE DIRECTIVES IN CHART?  No    NAME OF DECISION MAKER: Cindy Cabral    RELATIONSHIP OF DECISION MAKER TO PATIENT: Daughter    PATIENT/EVENT DESCRIPTION:  Radha Coburn is a 89 y.o. female who arrived via ground from home as a stroke alert. Pt awake alert. Pt was calm and able to communicate verbally. Pt to be admitted to .         SPIRITUAL ASSESSMENT-INTERVENTION-OUTCOME:   awaiting daughter and son in law to come to ED to support mom.  offered sustained ministry of presence and active listening.      PATIENT BELONGINGS:   did not handle belongings    ANY BELONGINGS OF SIGNIFICANT VALUE NOTED:  Unknown    REGISTRATION STAFF NOTIFIED?  Yes      WHAT IS YOUR SPIRITUAL CARE PLAN FOR THIS PATIENT?:   Spiritual  available upon further referral or request for any spiritual or emotional needs.      25 1651   Encounter Summary   Encounter Overview/Reason Crisis   Service Provided For Patient;Family   Referral/Consult From Multi-disciplinary team   Support System Children;Family members   Last Encounter  25   Complexity of Encounter Moderate   Begin Time 1630   End Time  1655   Total Time Calculated 25 min   Crisis   Type Trauma  (Stroke Alert)   Assessment/Intervention/Outcome   Assessment Calm;Coping   Intervention Active listening;Sustaining Presence/Ministry of presence   Outcome Coping   Plan and Referrals   Plan/Referrals Continue Support (comment)       Electronically signed by Melecio Crum,Chaplain Resident, on 2025 at 4:53 PM.  [facility]

## 2025-01-30 NOTE — ED NOTES
Per family, patient usually has no problems swallowing and takes her pills whole with water. Dr. Estrada notified of swallow screen. Med orders updated.

## 2025-01-30 NOTE — ED PROVIDER NOTES
EMERGENCY DEPARTMENT ENCOUNTER    Pt Name: Smiley Chiang  MRN: [id number]  Birthdate 11/4/1933  Date of evaluation: 1/30/25  CHIEF COMPLAINT       Chief Complaint   Patient presents with    Altered Mental Status     Pt arrives to ED via EMS from home with c/o AMS per family. Pt nonverbal at this time & able to some follow commands. LKW unknown at this time.     HISTORY OF PRESENT ILLNESS   This is a female patient, uncertain age that presents with complaints of difficulty with speech.  Patient's daughter check on the patient this morning and she was unable to get a hold of her so when she went over there the patient was mute and not able to speak.  Uncertain the last known well.           REVIEW OF SYSTEMS     Review of Systems  PASTMEDICAL HISTORY   No past medical history on file.  Past Problem List  There is no problem list on file for this patient.    SURGICAL HISTORY     No past surgical history on file.  CURRENT MEDICATIONS       Previous Medications    No medications on file     ALLERGIES     has no allergies on file.  FAMILY HISTORY     has no family status information on file.      SOCIAL HISTORY        PHYSICAL EXAM     INITIAL VITALS: BP (!) 218/133   Pulse 77   Temp 98.9 °F (37.2 °C) (Oral)   Resp 23   Ht 1.626 m (5' 4\")   Wt 77.1 kg (170 lb)   SpO2 94%   BMI 29.18 kg/m²    Physical Exam  Constitutional:       Appearance: Normal appearance.   HENT:      Head: Normocephalic and atraumatic.   Eyes:      Extraocular Movements: Extraocular movements intact.      Pupils: Pupils are equal, round, and reactive to light.   Cardiovascular:      Rate and Rhythm: Normal rate and regular rhythm.   Pulmonary:      Effort: Pulmonary effort is normal.      Breath sounds: Normal breath sounds.   Abdominal:      General: Abdomen is flat.      Palpations: Abdomen is soft.      Tenderness: There is no abdominal tenderness.   Neurological:      Mental Status: She is alert.      Comments: NIH stroke scale of 7 for

## 2025-01-30 NOTE — VIRTUAL HEALTH
[facility] Stroke and Telestroke Consult for  [place] Stroke Alert through [facility] @   1/30/2025 2:22 PM    Pt Name: Smiley Chiang  MRN: [id number]  YOB: 1933  Date of evaluation: 1/30/2025  Primary Care Physician: Pernell Lange MD  Reason for Evaluation: Stroke Evaluation with Discussion with Ed or primary team with Telemedicine and stroke evaluation with Review of imaging and labs    Smiley Chiang is a 89 y.o. female with PMHx of HTN, HLD, hx of DVT, right ICA aneurysm, who presented to ED due to speech disturbance     Patient received a call from her daughter at 1 pm yesterday at that time she had no complaints, she called again at 10 pm and patient expressed that she was feeling tired. She denies any slurred speech up until she call again at noon. On arrival patient intermittently following commands, globally aphasic, no weakness or numbness. Initial /114.     LKW: 10:00 pm 1/29/2024  NIH:  7    Allergies  has no allergies on file.  Medications  Prior to Admission medications    Not on File    Scheduled Meds:   aspirin  300 mg Rectal Once    labetalol  10 mg IntraVENous Once     Continuous Infusions:  PRN Meds:.  Past Medical History   has no past medical history on file.  Social History  Social History     Socioeconomic History    Marital status:      Spouse name: Not on file    Number of children: Not on file    Years of education: Not on file    Highest education level: Not on file   Occupational History    Not on file   Tobacco Use    Smoking status: Not on file    Smokeless tobacco: Not on file   Substance and Sexual Activity    Alcohol use: Not on file    Drug use: Not on file    Sexual activity: Not on file   Other Topics Concern    Not on file   Social History Narrative    Not on file     Social Determinants of Health     Financial Resource Strain: Not on file   Food Insecurity: Not on file   Transportation Needs: Not on file   Physical Activity: Not

## 2025-01-30 NOTE — ED NOTES
Radha Coburn  11/04/1933  CVA - CTA - left mca occlusion  NIHSS 7 - speech sx  No TNK - unknown last known well  CT Perfusion

## 2025-01-30 NOTE — ED NOTES
ED to inpatient nurses report      Chief Complaint:  Chief Complaint   Patient presents with    Cerebrovascular Accident     Present to ED from: [facility]    MOA:     LOC:  able to follow commands but mute  Mobility: Fully dependent  Oxygen Baseline: na    Current needs required: MRI    Pending ED orders: MRI  Present condition: stable    Why did the patient come to the ED? Transfer for stroke   What is the plan? MRI  Any procedures or intervention occur?   Any safety concerns??    Mental Status:  Level of Consciousness: Alert (0)    Psych Assessment:   Psychosocial  Psychosocial (WDL): Within Defined Limits  Vital signs   Vitals:    01/30/25 1650 01/30/25 1705 01/30/25 1715 01/30/25 1731   BP: (!) 216/109      Pulse: 76   77   Resp: 20   22   Temp:   98.6 °F (37 °C)    TempSrc:   Oral    SpO2: 94%      Height:  1.702 m (5' 7\")          Vitals:  Patient Vitals for the past 24 hrs:   BP Temp Temp src Pulse Resp SpO2 Height   01/30/25 1731 -- -- -- 77 22 -- --   01/30/25 1715 -- 98.6 °F (37 °C) Oral -- -- -- --   01/30/25 1705 -- -- -- -- -- -- 1.702 m (5' 7\")   01/30/25 1650 (!) 216/109 -- -- 76 20 94 % --   01/30/25 1648 -- -- -- 78 16 94 % --   01/30/25 1639 (!) 152/108 -- -- 72 20 92 % --      Visit Vitals  BP (!) 216/109   Pulse 77   Temp 98.6 °F (37 °C) (Oral)   Resp 22   Ht 1.702 m (5' 7\")   SpO2 94%   BMI 28.66 kg/m²        LDAs:   Peripheral IV 01/30/25 Left;Proximal;Anterior Forearm (Active)       Peripheral IV 01/30/25 Right Antecubital (Active)       Ambulatory Status:  Presents to emergency department  because of falls (Syncope, seizure, or loss of consciousness): Yes, Age > 70: Yes, Altered Mental Status, Intoxication with alcohol or substance confusion (Disorientation, impaired judgment, poor safety awaremess, or inability to follow instructions): Yes, Impaired Mobility: Ambulates or transfers with assistive devices or assistance; Unable to ambulate or transer.: Yes, Nursing Judgement:        Labs:  Labs Reviewed   STROKE PANEL - Abnormal; Notable for the following components:       Result Value    Sodium 125 (*)     Chloride 89 (*)     Glucose 134 (*)     Neutrophils % 72 (*)     Lymphocytes % 17 (*)     Troponin, High Sensitivity 16 (*)     All other components within normal limits       Electronically signed by Nilam Cruz RN on 1/30/2025 at 5:42 PM

## 2025-01-31 ENCOUNTER — APPOINTMENT (OUTPATIENT)
Dept: GENERAL RADIOLOGY | Age: 89
DRG: 067 | End: 2025-01-31
Payer: MEDICARE

## 2025-01-31 ENCOUNTER — APPOINTMENT (OUTPATIENT)
Dept: CT IMAGING | Age: 89
DRG: 067 | End: 2025-01-31
Payer: MEDICARE

## 2025-01-31 PROBLEM — R47.01 GLOBAL APHASIA: Status: ACTIVE | Noted: 2025-01-31

## 2025-01-31 PROBLEM — I63.512 ACUTE ISCHEMIC LEFT MIDDLE CEREBRAL ARTERY (MCA) STROKE (HCC): Status: ACTIVE | Noted: 2025-01-31

## 2025-01-31 PROBLEM — I63.9 ISCHEMIC STROKE (HCC): Status: ACTIVE | Noted: 2025-01-31

## 2025-01-31 PROBLEM — I63.512 ACUTE ISCHEMIC LEFT MIDDLE CEREBRAL ARTERY (MCA) STROKE (HCC): Status: ACTIVE | Noted: 2025-01-30

## 2025-01-31 LAB
ANION GAP SERPL CALCULATED.3IONS-SCNC: 13 MMOL/L (ref 9–16)
BUN SERPL-MCNC: 15 MG/DL (ref 8–23)
CALCIUM SERPL-MCNC: 9.1 MG/DL (ref 8.6–10.4)
CHLORIDE SERPL-SCNC: 93 MMOL/L (ref 98–107)
CHOLEST SERPL-MCNC: 159 MG/DL (ref 0–199)
CHOLESTEROL/HDL RATIO: 2.7
CO2 SERPL-SCNC: 21 MMOL/L (ref 20–31)
CREAT SERPL-MCNC: 0.6 MG/DL (ref 0.6–0.9)
EKG ATRIAL RATE: 357 BPM
EKG ATRIAL RATE: 86 BPM
EKG Q-T INTERVAL: 416 MS
EKG Q-T INTERVAL: 434 MS
EKG QRS DURATION: 90 MS
EKG QRS DURATION: 94 MS
EKG QTC CALCULATION (BAZETT): 455 MS
EKG QTC CALCULATION (BAZETT): 488 MS
EKG R AXIS: -46 DEGREES
EKG R AXIS: -48 DEGREES
EKG T AXIS: -17 DEGREES
EKG T AXIS: 52 DEGREES
EKG VENTRICULAR RATE: 72 BPM
EKG VENTRICULAR RATE: 76 BPM
ERYTHROCYTE [DISTWIDTH] IN BLOOD BY AUTOMATED COUNT: 13.5 % (ref 11.8–14.4)
EST. AVERAGE GLUCOSE BLD GHB EST-MCNC: 140 MG/DL
GFR, ESTIMATED: 85 ML/MIN/1.73M2
GLUCOSE SERPL-MCNC: 124 MG/DL (ref 74–99)
HBA1C MFR BLD: 6.5 % (ref 4–6)
HCT VFR BLD AUTO: 37.9 % (ref 36.3–47.1)
HDLC SERPL-MCNC: 58 MG/DL
HGB BLD-MCNC: 13.1 G/DL (ref 11.9–15.1)
LDLC SERPL CALC-MCNC: 89 MG/DL (ref 0–100)
MAGNESIUM SERPL-MCNC: 2.2 MG/DL (ref 1.7–2.3)
MCH RBC QN AUTO: 29.7 PG (ref 25.2–33.5)
MCHC RBC AUTO-ENTMCNC: 34.6 G/DL (ref 28.4–34.8)
MCV RBC AUTO: 85.9 FL (ref 82.6–102.9)
NRBC BLD-RTO: 0 PER 100 WBC
PLATELET # BLD AUTO: 244 K/UL (ref 138–453)
PMV BLD AUTO: 9.8 FL (ref 8.1–13.5)
POTASSIUM SERPL-SCNC: 3.4 MMOL/L (ref 3.7–5.3)
RBC # BLD AUTO: 4.41 M/UL (ref 3.95–5.11)
SODIUM SERPL-SCNC: 127 MMOL/L (ref 136–145)
TRIGL SERPL-MCNC: 62 MG/DL
VLDLC SERPL CALC-MCNC: 12 MG/DL (ref 1–30)
WBC OTHER # BLD: 9.5 K/UL (ref 3.5–11.3)

## 2025-01-31 PROCEDURE — 70450 CT HEAD/BRAIN W/O DYE: CPT

## 2025-01-31 PROCEDURE — 92610 EVALUATE SWALLOWING FUNCTION: CPT

## 2025-01-31 PROCEDURE — 93005 ELECTROCARDIOGRAM TRACING: CPT | Performed by: PSYCHIATRY & NEUROLOGY

## 2025-01-31 PROCEDURE — 97166 OT EVAL MOD COMPLEX 45 MIN: CPT

## 2025-01-31 PROCEDURE — 36415 COLL VENOUS BLD VENIPUNCTURE: CPT

## 2025-01-31 PROCEDURE — 97162 PT EVAL MOD COMPLEX 30 MIN: CPT

## 2025-01-31 PROCEDURE — 99233 SBSQ HOSP IP/OBS HIGH 50: CPT | Performed by: PSYCHIATRY & NEUROLOGY

## 2025-01-31 PROCEDURE — 2060000000 HC ICU INTERMEDIATE R&B

## 2025-01-31 PROCEDURE — 83735 ASSAY OF MAGNESIUM: CPT

## 2025-01-31 PROCEDURE — 2580000003 HC RX 258

## 2025-01-31 PROCEDURE — 6370000000 HC RX 637 (ALT 250 FOR IP): Performed by: PSYCHIATRY & NEUROLOGY

## 2025-01-31 PROCEDURE — 74018 RADEX ABDOMEN 1 VIEW: CPT

## 2025-01-31 PROCEDURE — 97530 THERAPEUTIC ACTIVITIES: CPT

## 2025-01-31 PROCEDURE — 6370000000 HC RX 637 (ALT 250 FOR IP)

## 2025-01-31 PROCEDURE — 92523 SPEECH SOUND LANG COMPREHEN: CPT

## 2025-01-31 PROCEDURE — 80061 LIPID PANEL: CPT

## 2025-01-31 PROCEDURE — 6360000002 HC RX W HCPCS

## 2025-01-31 PROCEDURE — 85027 COMPLETE CBC AUTOMATED: CPT

## 2025-01-31 PROCEDURE — 83036 HEMOGLOBIN GLYCOSYLATED A1C: CPT

## 2025-01-31 PROCEDURE — 80048 BASIC METABOLIC PNL TOTAL CA: CPT

## 2025-01-31 PROCEDURE — 97535 SELF CARE MNGMENT TRAINING: CPT

## 2025-01-31 RX ORDER — LABETALOL HYDROCHLORIDE 5 MG/ML
5 INJECTION, SOLUTION INTRAVENOUS EVERY 4 HOURS PRN
Status: DISCONTINUED | OUTPATIENT
Start: 2025-01-31 | End: 2025-02-02 | Stop reason: HOSPADM

## 2025-01-31 RX ORDER — ESCITALOPRAM OXALATE 10 MG/1
5 TABLET ORAL DAILY
Status: DISCONTINUED | OUTPATIENT
Start: 2025-01-31 | End: 2025-02-02 | Stop reason: HOSPADM

## 2025-01-31 RX ORDER — SODIUM CHLORIDE 9 MG/ML
INJECTION, SOLUTION INTRAVENOUS CONTINUOUS
Status: DISCONTINUED | OUTPATIENT
Start: 2025-01-31 | End: 2025-02-02 | Stop reason: HOSPADM

## 2025-01-31 RX ORDER — HYDRALAZINE HYDROCHLORIDE 20 MG/ML
5 INJECTION INTRAMUSCULAR; INTRAVENOUS EVERY 6 HOURS PRN
Status: DISCONTINUED | OUTPATIENT
Start: 2025-01-31 | End: 2025-02-02 | Stop reason: HOSPADM

## 2025-01-31 RX ORDER — SCOPOLAMINE 1 MG/3D
1 PATCH, EXTENDED RELEASE TRANSDERMAL
Status: DISCONTINUED | OUTPATIENT
Start: 2025-01-31 | End: 2025-02-02 | Stop reason: HOSPADM

## 2025-01-31 RX ORDER — ALPRAZOLAM 0.25 MG/1
0.25 TABLET ORAL ONCE
Status: COMPLETED | OUTPATIENT
Start: 2025-01-31 | End: 2025-01-31

## 2025-01-31 RX ORDER — 0.9 % SODIUM CHLORIDE 0.9 %
500 INTRAVENOUS SOLUTION INTRAVENOUS ONCE
Status: COMPLETED | OUTPATIENT
Start: 2025-01-31 | End: 2025-01-31

## 2025-01-31 RX ADMIN — SODIUM CHLORIDE 500 ML: 9 INJECTION, SOLUTION INTRAVENOUS at 02:27

## 2025-01-31 RX ADMIN — ALPRAZOLAM 0.25 MG: 0.25 TABLET ORAL at 12:42

## 2025-01-31 RX ADMIN — ONDANSETRON 4 MG: 2 INJECTION INTRAMUSCULAR; INTRAVENOUS at 14:47

## 2025-01-31 RX ADMIN — ONDANSETRON 4 MG: 2 INJECTION INTRAMUSCULAR; INTRAVENOUS at 20:24

## 2025-01-31 RX ADMIN — ESCITALOPRAM OXALATE 5 MG: 10 TABLET ORAL at 12:42

## 2025-01-31 RX ADMIN — SODIUM CHLORIDE: 9 INJECTION, SOLUTION INTRAVENOUS at 01:42

## 2025-01-31 NOTE — ACP (ADVANCE CARE PLANNING)
Advance Care Planning     Advance Care Planning Inpatient Note  [facility] Department    Today's Date: 1/31/2025  Unit: STVZ 1A NEURO    Received request from HealthCare Provider. Upon review of chart and communication with care team, Spiritual Care will defer advance care planning with patient at this time. Per family, patient is \"altered.\" Child/Children was/were present in the room during visit.    Goals of ACP Conversation:  Discuss advance care planning documents    Health Care Decision Makers:      Primary Decision Maker: Cindy Cabral - Child - [phone]    Secondary Decision Maker: Medina Turpin - Child - [phone]  Summary:  Verified Documents    Advance Care Planning Documents (Patient Wishes):  Healthcare Power of /Advance Directive Appointment of Health Care Agent     Assessment:  Patient was \"altered\" at time of visit.  provided support to patient's daughters, who were tearful and concerned for patient's well-being.    Interventions:  Requested patient/family to submit existing document for our records: Healthcare Power of /Advance Directive Appointment of Health Care Agent    Care Preferences Communicated:   Resuscitation:  In the event the patient's heart stopped as a result of an underlying serious health condition, patient's code status is \"DNRCCA,\" per family.     Outcomes/Plan:  ACP Discussion: Confirmed existing document.    Electronically signed by ARELI Carter on 1/31/2025 at 9:00 AM

## 2025-01-31 NOTE — PROGRESS NOTES
Facility/Department: [address] NEURO  Initial Speech/Language/Cognitive Assessment    NAME: Radha Coburn  : 1933   MRN: [id number]  ADMISSION DATE: 2025  ADMITTING DIAGNOSIS: has Essential hypertension /  edema from amlodipine /   Hyponatremia from hydrochlorothiazide; HLA B27 (HLA B27 positive) suspect ankylosing spondylitis / normal x-ray lumbar spine 2017; Right carpal tunnel syndrome; Sacroiliac joint dysfunction of right side; Piriformis syndrome of right side; Type 2 diabetes mellitus without complication, without long-term current use of insulin (HCC); Right internal carotid artery aneurysm; Acute respiratory failure with hypoxia; Chronic diastolic CHF (congestive heart failure), NYHA class 2 (HCC); Hyponatremia; Ischemic stroke (HCC); Atrial fibrillation (HCC); Aphasia; Acute cerebrovascular accident (CVA) due to occlusion of left middle cerebral artery (HCC); Cerebrovascular accident (CVA) (HCC); and Acute ischemic stroke (HCC) on their problem list.    Date of Eval: 2025   Evaluating Therapist: Isaiah Smith    RECENT RESULTS  CT OF HEAD/MRI:  IMPRESSION:  Evolving left frontal lobe cortical infarct with petechial hemorrhage. No  significant mass effect or midline shift.    Primary Complaint:  Radha Coburn is a 89 y.o. right handed female with a history of essential hypertension, chronic diastolic CHF NYHA class II, DVT, anxiety presents to [facility] for aphasia.     Patient was last known well at 10 PM on 2025.  This morning, patient's family noted that she had difficulty speaking.  They checked on her at 1 PM and was noted to have significant difficulty with speech production, and she was transported to [facility].  She underwent CT and CTA at that time, and showed left M3 occlusion.  CT head without IV contrast showed wedge-shaped hypodensity in left frontal lobe.     She was transferred to [facility] for further care.  She was seen and examined at

## 2025-01-31 NOTE — PROGRESS NOTES
Resuscitation/Code Status Note on Radha Coburn (YOB: 1933)    At 3:10 am on January 31, 2025, resuscitation/code status decision was based on a thorough discussion with the patient's healthcare power of  - Medina Turpin Nichols, Becky.  The code status was made DNR CCA with no Intubation    Electronically signed by Ashley Ryan MD on 1/31/25 at 3:13 AM EST

## 2025-01-31 NOTE — PROGRESS NOTES
activities    Goals  Short Term Goals  Time Frame for Short Term Goals: 14 visits  Short Term Goal 1: Complete transfers with RW and mod I  Short Term Goal 2: Complete 300 ft of gait with RW and mod I  Short Term Goal 3: Complete 3 steps with no handrail and mod I  Short Term Goal 4: Participate in 30 minutes of therapy to promote endurance    Minutes  PT Individual Minutes  Time In: 1409  Time Out: 1431  Minutes: 22  Time Code Minutes  Timed Code Treatment Minutes: 18 Minutes    Electronically signed by Zee Kong PT on 1/31/25 at 3:37 PM EST

## 2025-01-31 NOTE — PROGRESS NOTES
Endovascular Neurosurgery Consult      Reason for evaluation: Left M3 occlusion    SUBJECTIVE:     NAEO from EVN perspective. Rpt scans reviewed, MRI showing infarct with hemorrhagic transformation. Rpt CT stable.    History of Chief Complaint:      Radha Coburn is a 89 y.o. right handed female with a history of essential hypertension, chronic diastolic CHF NYHA class II, DVT, anxiety presents to [place] for aphasia.     Patient was last known well at 10 PM on 1/29/2025.  This morning, patient's family noted that she had difficulty speaking.  They checked on her at 1 PM and was noted to have significant difficulty with speech production, and she was transported to [facility].  She underwent CT and CTA at that time, and showed left M3 occlusion.  CT head without IV contrast showed wedge-shaped hypodensity in left frontal lobe.     She was transferred to [place] for further care.  She was seen and examined at bedside.  She had global aphasia with severe dysarthria.  NIHSS12.  She does not follow verbal commands, however will follow some mimicking actions (such as closing eyes or waving).  She is not able to follow complex commands.      CT perfusion was done and shows no mismatch, however this could be confounded by her heart failure.  In addition, she was found to have atrial fibrillation while at [place].       LKW: 10 PM on 1/29/2025  NIHSS: 12  Modified Culberson Scale: 0  SBP: 152/108  Glucose: 127  CT head without contrast: Left frontal hypodensity.  TNK: Not indicated due to last known well greater than 4.5 hours.  Endovascular: No intervention planned.    Allergies  is allergic to percocet [oxycodone-acetaminophen].  Medications  Prior to Admission medications    Medication Sig Start Date End Date Taking? Authorizing Provider   olmesartan (BENICAR) 40 MG tablet Take 1 tablet by mouth daily 12/13/24  Yes Amandeep Ramos MD   hydrocortisone (PROCTO-MED HC) 2.5 % CREA rectal cream  provider] aspirin  81 mg Per NG tube Daily    [Held by provider] clopidogrel  75 mg Per NG tube Daily     Continuous Infusions:   sodium chloride 100 mL/hr at 01/31/25 0438    sodium chloride       PRN Meds:.hydrALAZINE, labetalol, sodium chloride flush, sodium chloride, ondansetron **OR** ondansetron, polyethylene glycol, sulfur hexafluoride microspheres  Past Medical History   has a past medical history of Allergic rhinitis, Anxiety, Arthritis, DVT (deep venous thrombosis) ([facility]), Essential hypertension, Hyperlipidemia, Hypertensive urgency, Impaired fasting glucose, Osteoarthritis of right hip / Total Right Arthroplasty 2014, Osteoarthritis of right knee / Right TKA 2014, PONV (postoperative nausea and vomiting), Shingles, and Stroke ([facility]).  Past Surgical History   has a past surgical history that includes Appendectomy; Total hip arthroplasty (Right, 2-); Knee Arthroplasty (Right, 2014); Breast surgery (Left); eye surgery; joint replacement (Left, 1/11/2016); Knee arthroscopy (Left, 05/16/2016); and Injection Procedure For Sacroiliac Joint (Right, 10/30/2019).  Social History   reports that she has never smoked. She has never used smokeless tobacco.   reports no history of alcohol use.   reports no history of drug use.  Family History  family history includes Cancer in her brother and sister; Heart Disease in her brother, father, mother, sister, and sister.    Review of Systems:    Unable to obtain ROS due to patient's severe aphasia      OBJECTIVE:     Vitals:    01/31/25 0800   BP: (!) 112/45   Pulse: 70   Resp: 16   Temp: 98.7 °F (37.1 °C)   SpO2: 90%        Gen: No acute distress  MS: Arouses to voice. Global aphasia. Does not follow commands. Unable to articulate meaningful words.  CN: Pupils reactive, slight L gaze deviation, mild R facial droop, tongue midline, no dysarthria  Motor: Moves all extremities antigravity 4/5  Sens: Responds to LT in all extremities  Gait: Deferred          Initial

## 2025-01-31 NOTE — PROGRESS NOTES
Spiritual Health History and Assessment/Progress Note  [facility]    (P) Advance Care Planning, Trauma (Stroke Alert),  ,      Name: Radha Coburn MRN: [id number]    Age: 89 y.o.     Sex: female   Language: English   Jewish: Mormon   Ischemic stroke (HCC)     Date: 1/31/2025            Total Time Calculated: (P) 26 min              Spiritual Assessment continued in [facility]        Referral/Consult From: (P) Multi-disciplinary team (Spiritual Care Consult)   Encounter Overview/Reason: (P) Advance Care Planning  Service Provided For: (P) Family    Narrative:  visited unit, per Spiritual Care Consult for Advance Directives. Patient was undergoing CT Scan, when  visited room. Patient's two daughters, Cindy and Medina, were present in room at time of visit. Per daughters, patient experienced a \"Stroke\" and remains \"Altered.\" Patient is otherwise \"sharp as a tack,\" per family.  reviewed patient's chart in room to confirm her Healthcare Power of  paperwork.  ensured that patient's chart reflected the correct decision makers. Patient was brought back to room during visit and remained \"altered.\" Patient was non-verbal at time of visit.  offered support and care to family, who were tearful and concerned for patient.     Kelley, Belief, Meaning:   Patient unable to assess at this time  Family/Friends have beliefs or practices that help with coping during difficult times      Importance and Influence:  Patient unable to assess at this time  Family/Friends have spiritual/personal beliefs that influence decisions regarding the patient's health    Community:  Patient Other: Patient altered at time of visit.  Family/Friends No family/friends present    Assessment and Plan of Care:     Patient Interventions include: Other: Patient altered at time of visit.  Family/Friends Interventions include: Facilitated expression of thoughts and feelings and Affirmed  coping skills/support systems    Patient Plan of Care: Spiritual Care available upon further referral  Family/Friends Plan of Care: Spiritual Care available upon further referral    Electronically signed by ARELI Carter on 1/31/2025 at 8:56 AM

## 2025-01-31 NOTE — PROGRESS NOTES
Occupational Therapy Initial Evaluation  Facility/Department: [address] NEURO   Patient Name: Radha Coburn        MRN: [id number]    : 1933    Date of Service: 2025    Chief Complaint   Patient presents with    Cerebrovascular Accident     Past Medical History:  has a past medical history of Allergic rhinitis, Anxiety, Arthritis, DVT (deep venous thrombosis) (HCC), Essential hypertension, Hyperlipidemia, Hypertensive urgency, Impaired fasting glucose, Osteoarthritis of right hip / Total Right Arthroplasty , Osteoarthritis of right knee / Right TKA , PONV (postoperative nausea and vomiting), Shingles, and Stroke (HCC).  Past Surgical History:  has a past surgical history that includes Appendectomy; Total hip arthroplasty (Right, 2014); Knee Arthroplasty (Right, ); Breast surgery (Left); eye surgery; joint replacement (Left, 2016); Knee arthroscopy (Left, 2016); and Injection Procedure For Sacroiliac Joint (Right, 10/30/2019).    Discharge Recommendations  Discharge Recommendations: Patient would benefit from continued therapy after discharge  OT Equipment Recommendations  Equipment Needed: No    Assessment  Performance deficits / Impairments: Decreased safe awareness;Decreased cognition;Decreased ADL status  Assessment: Pt completes bed mobility SBA and functional mobility and transfers at CGA with RW support. Cuing needed to follow commands and for safety as pt appears to be globally aphasic, but with Good ability to minic actions to follow through with commands. Pt is IND at baseline with all tasks and lives alone per daughters at bedside. Pt limited by above deficits impacting functional performance. Pt would benefit from continued therapy prior to and following discharge from acute setting to increase safety and IND in self care.  Prognosis: Good  Decision Making: Medium Complexity  REQUIRES OT FOLLOW-UP: Yes  Activity Tolerance  Activity Tolerance: Treatment limited

## 2025-01-31 NOTE — PROGRESS NOTES
mmol/L    Chloride 89 (L) 98 - 107 mmol/L    CO2 22 20 - 31 mmol/L    Anion Gap 14 9 - 16 mmol/L    Glucose 134 (H) 74 - 99 mg/dL    BUN 9 8 - 23 mg/dL    Creatinine 0.6 0.6 - 0.9 mg/dL    Est, Glom Filt Rate 85 >60 mL/min/1.73m2    Calcium 9.5 8.6 - 10.4 mg/dL    WBC 8.0 3.5 - 11.3 k/uL    RBC 4.73 3.95 - 5.11 m/uL    Hemoglobin 13.7 11.9 - 15.1 g/dL    Hematocrit 41.7 36.3 - 47.1 %    MCV 88.2 82.6 - 102.9 fL    MCH 29.0 25.2 - 33.5 pg    MCHC 32.9 28.4 - 34.8 g/dL    RDW 13.3 11.8 - 14.4 %    Platelets 239 138 - 453 k/uL    MPV 10.1 8.1 - 13.5 fL    NRBC Automated 0.0 0.0 per 100 WBC    Total CK 40 26 - 192 U/L    Neutrophils % 72 (H) 36 - 65 %    Lymphocytes % 17 (L) 24 - 43 %    Monocytes % 10 3 - 12 %    Eosinophils % 1 1 - 4 %    Basophils % 0 0 - 2 %    Immature Granulocytes % 0 0 %    Neutrophils Absolute 5.70 1.50 - 8.10 k/uL    Lymphocytes Absolute 1.38 1.10 - 3.70 k/uL    Monocytes Absolute 0.79 0.10 - 1.20 k/uL    Eosinophils Absolute 0.11 0.00 - 0.44 k/uL    Basophils Absolute <0.03 0.00 - 0.20 k/uL    Immature Granulocytes Absolute <0.03 0.00 - 0.30 k/uL    Myoglobin 37 25 - 58 ng/mL    Protime 13.9 11.7 - 14.9 sec    INR 1.1     APTT 33.7 23.0 - 36.5 sec    Troponin, High Sensitivity 16 (H) 0 - 14 ng/L   Basic Metabolic Panel w/ Reflex to MG    Collection Time: 01/31/25  6:12 AM   Result Value Ref Range    Sodium 127 (L) 136 - 145 mmol/L    Potassium 3.4 (L) 3.7 - 5.3 mmol/L    Chloride 93 (L) 98 - 107 mmol/L    CO2 21 20 - 31 mmol/L    Anion Gap 13 9 - 16 mmol/L    Glucose 124 (H) 74 - 99 mg/dL    BUN 15 8 - 23 mg/dL    Creatinine 0.6 0.6 - 0.9 mg/dL    Est, Glom Filt Rate 85 >60 mL/min/1.73m2    Calcium 9.1 8.6 - 10.4 mg/dL   CBC    Collection Time: 01/31/25  6:12 AM   Result Value Ref Range    WBC 9.5 3.5 - 11.3 k/uL    RBC 4.41 3.95 - 5.11 m/uL    Hemoglobin 13.1 11.9 - 15.1 g/dL    Hematocrit 37.9 36.3 - 47.1 %    MCV 85.9 82.6 - 102.9 fL    MCH 29.7 25.2 - 33.5 pg    MCHC 34.6 28.4 - 34.8 g/dL     RDW 13.5 11.8 - 14.4 %    Platelets 244 138 - 453 k/uL    MPV 9.8 8.1 - 13.5 fL    NRBC Automated 0.0 0.0 per 100 WBC   Lipid Panel    Collection Time: 01/31/25  6:12 AM   Result Value Ref Range    Cholesterol, Total 159 0 - 199 mg/dL    HDL 58 >40 mg/dL    LDL Cholesterol 89 0 - 100 mg/dL    Chol/HDL Ratio 2.7     Triglycerides 62 <150 mg/dL    VLDL 12 1 - 30 mg/dL   Magnesium    Collection Time: 01/31/25  6:12 AM   Result Value Ref Range    Magnesium 2.2 1.7 - 2.3 mg/dL       Radiology:  CT HEAD WO CONTRAST    Result Date: 1/31/2025  Evolving left frontal lobe cortical infarct with petechial hemorrhage. No significant mass effect or midline shift.     XR ABDOMEN FOR NG/OG/NE TUBE PLACEMENT    Result Date: 1/31/2025  Enteric tube terminates in the gastric antrum in satisfactory position.     MRI BRAIN WO CONTRAST    Result Date: 1/30/2025  1. Acute infarct in the suprasylvian left frontal lobe with areas of associated hemorrhage. 2. Chronic microvascular ischemic changes. Remote infarct in the right cerebellar hemisphere. 3. Stable calcified 12 mm left parafalcine lesion most consistent with a meningioma. The findings were sent to the Radiology Results Communication Center at 8:29 pm on 1/30/2025 to be communicated to a licensed caregiver.     CT BRAIN PERFUSION    Addendum Date: 1/30/2025    ADDENDUM: Results were reported to Dr. Gallegos by radiology results communication at 5:50 p.m. on January 30, 2025.     Result Date: 1/30/2025  On the CT head from earlier today, there is acute to subacute infarction of the left frontal lobe. This area was not well evaluated on the CT head perfusion. No perfusion mismatch in the visualized portion of the brain.       Assessment & Plan     Radha Coburn is a 89 y.o. female who presents with severe mixed aphasia.     1. Left M3 ischemic stroke with HT-2 hemorrhagic transformation. Etiology: cardioembolism.   HAS BLED score of 2. Can consider starting DOAC in 2 weeks  S/P

## 2025-01-31 NOTE — PLAN OF CARE
Problem: Chronic Conditions and Co-morbidities  Goal: Patient's chronic conditions and co-morbidity symptoms are monitored and maintained or improved  Outcome: Progressing     Problem: Discharge Planning  Goal: Discharge to home or other facility with appropriate resources  Outcome: Progressing     Problem: Pain  Goal: Verbalizes/displays adequate comfort level or baseline comfort level  Outcome: Progressing     Problem: Safety - Adult  Goal: Free from fall injury  Outcome: Progressing     Problem: ABCDS Injury Assessment  Goal: Absence of physical injury  Outcome: Progressing     Problem: Skin/Tissue Integrity  Goal: Skin integrity remains intact  Description: 1.  Monitor for areas of redness and/or skin breakdown  2.  Assess vascular access sites hourly  3.  Every 4-6 hours minimum:  Change oxygen saturation probe site  4.  Every 4-6 hours:  If on nasal continuous positive airway pressure, respiratory therapy assess nares and determine need for appliance change or resting period  Outcome: Progressing

## 2025-01-31 NOTE — PROGRESS NOTES
Facility/Department: [address] NEURO   CLINICAL BEDSIDE SWALLOW EVALUATION    NAME: Radha Coburn  : 1933  MRN: [id number]    ADMISSION DATE: 2025  ADMITTING DIAGNOSIS: has Essential hypertension /  edema from amlodipine /   Hyponatremia from hydrochlorothiazide; HLA B27 (HLA B27 positive) suspect ankylosing spondylitis / normal x-ray lumbar spine 2017; Right carpal tunnel syndrome; Sacroiliac joint dysfunction of right side; Piriformis syndrome of right side; Type 2 diabetes mellitus without complication, without long-term current use of insulin (HCC); Right internal carotid artery aneurysm; Acute respiratory failure with hypoxia; Chronic diastolic CHF (congestive heart failure), NYHA class 2 (HCC); Hyponatremia; Ischemic stroke (HCC); Atrial fibrillation (HCC); Aphasia; Acute cerebrovascular accident (CVA) due to occlusion of left middle cerebral artery (HCC); Cerebrovascular accident (CVA) (HCC); and Acute ischemic stroke (HCC) on their problem list.      Date of Eval: 2025  Evaluating Therapist: OVI Azar    Current Diet level:  Current Diet : NPO  Current Liquid Diet : NPO    Primary Complaint   Radha Coburn is a 89 y.o. right handed female with a history of essential hypertension, chronic diastolic CHF NYHA class II, DVT, anxiety presents to [facility] for aphasia.     Patient was last known well at 10 PM on 2025.  This morning, patient's family noted that she had difficulty speaking.  They checked on her at 1 PM and was noted to have significant difficulty with speech production, and she was transported to [facility].  She underwent CT and CTA at that time, and showed left M3 occlusion.  CT head without IV contrast showed wedge-shaped hypodensity in left frontal lobe.     She was transferred to [facility] for further care.  She was seen and examined at bedside.  She had global aphasia with severe dysarthria.  NIHSS12.  She does not follow verbal commands,

## 2025-02-01 PROBLEM — I48.92 ATRIAL FLUTTER (HCC): Status: ACTIVE | Noted: 2025-02-01

## 2025-02-01 LAB
EKG Q-T INTERVAL: 390 MS
EKG QRS DURATION: 92 MS
EKG QTC CALCULATION (BAZETT): 469 MS
EKG R AXIS: -34 DEGREES
EKG T AXIS: 59 DEGREES
EKG VENTRICULAR RATE: 87 BPM

## 2025-02-01 PROCEDURE — 99222 1ST HOSP IP/OBS MODERATE 55: CPT | Performed by: INTERNAL MEDICINE

## 2025-02-01 PROCEDURE — 97530 THERAPEUTIC ACTIVITIES: CPT

## 2025-02-01 PROCEDURE — 99222 1ST HOSP IP/OBS MODERATE 55: CPT

## 2025-02-01 PROCEDURE — 6370000000 HC RX 637 (ALT 250 FOR IP)

## 2025-02-01 PROCEDURE — 6360000002 HC RX W HCPCS

## 2025-02-01 PROCEDURE — 99232 SBSQ HOSP IP/OBS MODERATE 35: CPT | Performed by: PSYCHIATRY & NEUROLOGY

## 2025-02-01 PROCEDURE — 2500000003 HC RX 250 WO HCPCS

## 2025-02-01 PROCEDURE — 2060000000 HC ICU INTERMEDIATE R&B

## 2025-02-01 PROCEDURE — 97110 THERAPEUTIC EXERCISES: CPT

## 2025-02-01 PROCEDURE — 6370000000 HC RX 637 (ALT 250 FOR IP): Performed by: PSYCHIATRY & NEUROLOGY

## 2025-02-01 PROCEDURE — 93010 ELECTROCARDIOGRAM REPORT: CPT | Performed by: FAMILY MEDICINE

## 2025-02-01 PROCEDURE — 92526 ORAL FUNCTION THERAPY: CPT

## 2025-02-01 PROCEDURE — 6370000000 HC RX 637 (ALT 250 FOR IP): Performed by: INTERNAL MEDICINE

## 2025-02-01 PROCEDURE — 92507 TX SP LANG VOICE COMM INDIV: CPT

## 2025-02-01 RX ORDER — AMLODIPINE BESYLATE 5 MG/1
5 TABLET ORAL DAILY
Status: DISCONTINUED | OUTPATIENT
Start: 2025-02-01 | End: 2025-02-02 | Stop reason: HOSPADM

## 2025-02-01 RX ORDER — GUAIFENESIN/DEXTROMETHORPHAN 100-10MG/5
5 SYRUP ORAL EVERY 4 HOURS PRN
Status: DISCONTINUED | OUTPATIENT
Start: 2025-02-01 | End: 2025-02-02 | Stop reason: HOSPADM

## 2025-02-01 RX ORDER — PROCHLORPERAZINE EDISYLATE 5 MG/ML
5 INJECTION INTRAMUSCULAR; INTRAVENOUS ONCE
Status: COMPLETED | OUTPATIENT
Start: 2025-02-01 | End: 2025-02-01

## 2025-02-01 RX ORDER — CARVEDILOL 6.25 MG/1
6.25 TABLET ORAL 2 TIMES DAILY
Status: DISCONTINUED | OUTPATIENT
Start: 2025-02-01 | End: 2025-02-02 | Stop reason: HOSPADM

## 2025-02-01 RX ADMIN — SODIUM CHLORIDE, PRESERVATIVE FREE 10 ML: 5 INJECTION INTRAVENOUS at 07:50

## 2025-02-01 RX ADMIN — Medication 5 MG: at 07:50

## 2025-02-01 RX ADMIN — GUAIFENESIN AND DEXTROMETHORPHAN 5 ML: 100; 10 SYRUP ORAL at 09:04

## 2025-02-01 RX ADMIN — SODIUM CHLORIDE, PRESERVATIVE FREE 10 ML: 5 INJECTION INTRAVENOUS at 20:06

## 2025-02-01 RX ADMIN — SODIUM CHLORIDE, PRESERVATIVE FREE 10 ML: 5 INJECTION INTRAVENOUS at 09:50

## 2025-02-01 RX ADMIN — HYDRALAZINE HYDROCHLORIDE 5 MG: 20 INJECTION INTRAMUSCULAR; INTRAVENOUS at 09:49

## 2025-02-01 RX ADMIN — PROCHLORPERAZINE EDISYLATE 5 MG: 5 INJECTION INTRAMUSCULAR; INTRAVENOUS at 16:28

## 2025-02-01 RX ADMIN — SODIUM CHLORIDE, PRESERVATIVE FREE 10 ML: 5 INJECTION INTRAVENOUS at 16:28

## 2025-02-01 RX ADMIN — CARVEDILOL 6.25 MG: 6.25 TABLET, FILM COATED ORAL at 20:06

## 2025-02-01 RX ADMIN — ROSUVASTATIN CALCIUM 40 MG: 20 TABLET, FILM COATED ORAL at 20:06

## 2025-02-01 RX ADMIN — AMLODIPINE BESYLATE 5 MG: 5 TABLET ORAL at 16:27

## 2025-02-01 RX ADMIN — GUAIFENESIN AND DEXTROMETHORPHAN 5 ML: 100; 10 SYRUP ORAL at 04:51

## 2025-02-01 RX ADMIN — GUAIFENESIN AND DEXTROMETHORPHAN 5 ML: 100; 10 SYRUP ORAL at 15:53

## 2025-02-01 RX ADMIN — ONDANSETRON 4 MG: 2 INJECTION INTRAMUSCULAR; INTRAVENOUS at 11:35

## 2025-02-01 RX ADMIN — ESCITALOPRAM OXALATE 5 MG: 10 TABLET ORAL at 07:50

## 2025-02-01 RX ADMIN — SODIUM CHLORIDE, PRESERVATIVE FREE 10 ML: 5 INJECTION INTRAVENOUS at 11:39

## 2025-02-01 RX ADMIN — CARVEDILOL 6.25 MG: 6.25 TABLET, FILM COATED ORAL at 09:04

## 2025-02-01 RX ADMIN — GUAIFENESIN AND DEXTROMETHORPHAN 5 ML: 100; 10 SYRUP ORAL at 21:24

## 2025-02-01 ASSESSMENT — PAIN SCALES - WONG BAKER: WONGBAKER_NUMERICALRESPONSE: NO HURT

## 2025-02-01 NOTE — PROGRESS NOTES
Physical Therapy  Facility/Department: [address] NEURO   Physical Therapy Daily Treatment Note    Patient Name: Radha Coburn        MRN: [id number]    : 1933    Date of Service: 2025    Chief Complaint   Patient presents with    Cerebrovascular Accident     Past Medical History:  has a past medical history of Allergic rhinitis, Anxiety, Arthritis, DVT (deep venous thrombosis) ([facility]), Essential hypertension, Hyperlipidemia, Hypertensive urgency, Impaired fasting glucose, Osteoarthritis of right hip / Total Right Arthroplasty , Osteoarthritis of right knee / Right TKA , PONV (postoperative nausea and vomiting), Shingles, and Stroke ([facility]).  Past Surgical History:  has a past surgical history that includes Appendectomy; Total hip arthroplasty (Right, 2014); Knee Arthroplasty (Right, ); Breast surgery (Left); eye surgery; joint replacement (Left, 2016); Knee arthroscopy (Left, 2016); and Injection Procedure For Sacroiliac Joint (Right, 10/30/2019).    Discharge Recommendations  Discharge Recommendations: Patient would benefit from continued therapy after discharge, Home with Home health PT  PT Equipment Recommendations  Equipment Needed: No  Other: owns rollator    Assessment  Body Structures, Functions, Activity Limitations Requiring Skilled Therapeutic Intervention: Decreased functional mobility ;Decreased ADL status;Decreased body mechanics;Decreased strength;Decreased high-level IADLs;Decreased balance;Decreased endurance;Decreased safe awareness;Decreased coordination;Decreased cognition;Decreased posture  Assessment: Pt able to ambulate 20ft with rollator and CGA. Pt is CGA for transfers. Pt is most limited by decreased balance and safety awareness. Pt could benefit from therapy following d/c to address deficits and facilitate a safe return to home.  Therapy Prognosis: Good  Activity Tolerance  Activity Tolerance: Patient limited by fatigue;Patient limited by

## 2025-02-01 NOTE — PROGRESS NOTES
[facility] Neurology   IN-PATIENT SERVICE   [facility]    Progress Note             Date:   2/1/2025  Patient name:  Radha Coburn  Date of admission:  1/30/2025  4:37 PM  MRN:   [id number]  Account:  [id number]  YOB: 1933  PCP:    Amandeep Ramos MD  Room:   [address] [place] [address]  Code Status:    DNR-CCA    Chief Complaint:     Chief Complaint   Patient presents with    Cerebrovascular Accident       Interval hx:     The patient was seen and examined at bedside. Is vitally stable, alert, severe mixed aphasia, difficult to assess for orientation. No acute events overnight.      Blood pressure goal 100-140  Multiple readings above goal  Coreg 6.25 mg twice daily was started by cardiology   Received labetalol 5 mg once at 7:30 AM    Remains off AC for now due to hemorrhagic transformation of new left frontal stroke  Last CT head was 1/31: Evolving left frontal lobe cortical infarct with petechial hemorrhage. No significant mass effect or midline shift.    Is on aspirin alone    PT OT eval and treat    Consider PM&R consult  Family might not consider placement     Echo pending     Brief History of Present Illness:     Radha Coburn is a 89 y.o. female with PMHx of HTN, HLD, hx of DVT, right ICA aneurysm, who presented to ED due to speech disturbance      Patient received a call from her daughter at 1 pm yesterday at that time she had no complaints, she called again at 10 pm and patient expressed that she was feeling tired. She denies any slurred speech up until she call again at noon. On arrival to [facility], patient intermittently following commands, globally aphasic, no weakness or numbness. Initial /114. LKW: 10:00 pm 1/29/2024.    She underwent CT and CTA at that time, and showed left M3 occlusion.  CT head without IV contrast showed wedge-shaped hypodensity in left frontal lobe.     She was transferred to [facility] for further care.  She was seen and examined

## 2025-02-01 NOTE — PROGRESS NOTES
Speech Language Pathology  [facility]    Speech Language Treatment Note    Date: 2/1/2025  Patient’s Name: Radha Coburn  MRN: [id number]  Diagnosis:   Patient Active Problem List   Diagnosis Code    Essential hypertension /  edema from amlodipine /   Hyponatremia from hydrochlorothiazide I10    HLA B27 (HLA B27 positive) suspect ankylosing spondylitis / normal x-ray lumbar spine 2017 Z15.89    Right carpal tunnel syndrome G56.01    Sacroiliac joint dysfunction of right side M53.3    Piriformis syndrome of right side G57.01    Type 2 diabetes mellitus without complication, without long-term current use of insulin ([facility]) E11.9    Right internal carotid artery aneurysm I67.1    Acute respiratory failure with hypoxia J96.01    Chronic diastolic CHF (congestive heart failure), NYHA class 2 ([facility]) I50.32    Hyponatremia E87.1    Ischemic stroke ([facility]) I63.9    Atrial fibrillation ([facility]) I48.91    Global aphasia R47.01    Acute cerebrovascular accident (CVA) due to occlusion of left middle cerebral artery ([facility]) I63.512    Cerebrovascular accident (CVA) ([facility]) I63.9    Acute ischemic left middle cerebral artery (MCA) stroke ([facility]) I63.512       Pain: does not appear in pain; responded with thumbs down when prompted with yes/no about pain    Speech and Language Treatment  Treatment time: speech/lang 9699-0046  Dysphagia 1316-26    Subjective: [x] Alert [] Cooperative     [x] Confused     [x] Agitated    [] Lethargic      Objective/Assessment:  Auditory Comprehension: yes/no 2/5 using thumbs up/down; bio yes/no 2/3 thumbs up/down  Pt refused to complete other task such as song actions/completion, or object identification.    Speech: attempted imitation of speech sounds/vowels, 0/5x given models and verbal cues.       Dysphagia: Pt 1x sip of thin and 1x bite of solid (grape) without overt s/s of aspiration; pt refused repeat trials to determine consistency of these attempts. Daughter reported pt has been

## 2025-02-01 NOTE — CARE COORDINATION
Case Management Assessment  Initial Evaluation    Date/Time of Evaluation: 2/1/2025 1046 AM  Assessment Completed by: Odalys Leblanc    If patient is discharged prior to next notation, then this note serves as note for discharge by case management.    Patient Name: Radha Coburn                   YOB: 1933  Diagnosis: Ischemic stroke (HCC) [I63.9]  Cerebrovascular accident (CVA), unspecified mechanism (HCC) [I63.9]                   Date / Time: 1/30/2025  4:37 PM    Patient Admission Status: Inpatient   Readmission Risk (Low < 19, Mod (19-27), High > 27): Readmission Risk Score: 15.4    Current PCP: Amandeep Ramos MD  PCP verified by CM? (P) Yes (Dr Amandeep Ramos)    Chart Reviewed: Yes      History Provided by: (P) Patient, Child/Family  Patient Orientation: (P) Alert and Oriented, Person, Place, Situation, Self    Patient Cognition: (P) Alert    Hospitalization in the last 30 days (Readmission):  No    If yes, Readmission Assessment in  Navigator will be completed.    Advance Directives:      Code Status: DNR-CCA   Patient's Primary Decision Maker is: (P) Legal Next of Kin    Primary Decision Maker: MariannaCindy - Child - [phone]    Secondary Decision Maker: Medina Turpin - Child - [phone]    Discharge Planning:    Patient lives with: (P) Alone Type of Home: (P) House  Primary Care Giver: (P) Self  Patient Support Systems include: (P) Children   Current Financial resources: (P) Medicare, [place]  Current community resources:    Current services prior to admission: (P) Durable Medical Equipment            Current DME: (P) Cane, Shower Chair, Other (Comment) (has high toilet, rollator and quad cane)            Type of Home Care services:  (P) OT, PT, Skilled Therapy    ADLS  Prior functional level: (P) Independent in ADLs/IADLs  Current functional level: (P) Assistance with the following:, Bathing, Dressing, Toileting, Cooking, Housework, Shopping, Mobility, Other (see

## 2025-02-01 NOTE — PROGRESS NOTES
Endovascular Neurosurgery Progress Notes      Reason for evaluation: Left M3 occlusion    SUBJECTIVE:     NAEO from EVN perspective. No new focal neuro deficits. Continues to struggle with aphasia.    History of Chief Complaint:      Radha Coburn is a 89 y.o. right handed female with a history of essential hypertension, chronic diastolic CHF NYHA class II, DVT, anxiety presents to [facility] for aphasia.     Patient was last known well at 10 PM on 1/29/2025.  This morning, patient's family noted that she had difficulty speaking.  They checked on her at 1 PM and was noted to have significant difficulty with speech production, and she was transported to [facility].  She underwent CT and CTA at that time, and showed left M3 occlusion.  CT head without IV contrast showed wedge-shaped hypodensity in left frontal lobe.     She was transferred to [facility] for further care.  She was seen and examined at bedside.  She had global aphasia with severe dysarthria.  NIHSS12.  She does not follow verbal commands, however will follow some mimicking actions (such as closing eyes or waving).  She is not able to follow complex commands.      CT perfusion was done and shows no mismatch, however this could be confounded by her heart failure.  In addition, she was found to have atrial fibrillation while at [place].       LKW: 10 PM on 1/29/2025  NIHSS: 12  Modified Luna Scale: 0  SBP: 152/108  Glucose: 127  CT head without contrast: Left frontal hypodensity.  TNK: Not indicated due to last known well greater than 4.5 hours.  Endovascular: No intervention planned.    Allergies  is allergic to percocet [oxycodone-acetaminophen].  Medications  Prior to Admission medications    Medication Sig Start Date End Date Taking? Authorizing Provider   olmesartan (BENICAR) 40 MG tablet Take 1 tablet by mouth daily 12/13/24  Yes Amandeep Ramos MD   hydrocortisone (PROCTO-MED HC) 2.5 % CREA rectal cream Use TID prn for

## 2025-02-01 NOTE — CONSULTS
Cardiology Consultation         Date:   2/1/2025  Patient name: Radha Coburn  Date of admission:  1/30/2025  4:37 PM  MRN:   [id number]  YOB: 1933    Reason for Admission: CVA     Chief Complaint: aphasia     History of present illness:    89 y.o. female with past cardiac hx HFpEF, HTN, was transferred from outside hospital for CVA, found to have L MCA occlusion. She is aphasic, family is at bedside. She is currently in afib, which is new to her. BP is hypertensive, HR controlled in the 80-90s, on RA.    Past Medical History:   has a past medical history of Allergic rhinitis, Anxiety, Arthritis, DVT (deep venous thrombosis) (HCC), Essential hypertension, Hyperlipidemia, Hypertensive urgency, Impaired fasting glucose, Osteoarthritis of right hip / Total Right Arthroplasty 2014, Osteoarthritis of right knee / Right TKA 2014, PONV (postoperative nausea and vomiting), Shingles, and Stroke (HCC).    Past Surgical History:   has a past surgical history that includes Appendectomy; Total hip arthroplasty (Right, 2-); Knee Arthroplasty (Right, 2014); Breast surgery (Left); eye surgery; joint replacement (Left, 1/11/2016); Knee arthroscopy (Left, 05/16/2016); and Injection Procedure For Sacroiliac Joint (Right, 10/30/2019).     Home Medications:    Prior to Admission medications    Medication Sig Start Date End Date Taking? Authorizing Provider   olmesartan (BENICAR) 40 MG tablet Take 1 tablet by mouth daily 12/13/24  Yes Amandeep Ramos MD   hydrocortisone (PROCTO-MED HC) 2.5 % CREA rectal cream Use TID prn for hemorroids 12/13/24  Yes Amandeep Ramos MD   carvedilol (COREG) 25 MG tablet Take 1 tablet by mouth 2 times daily 12/13/24  Yes Amandeep Ramos MD   amLODIPine (NORVASC) 2.5 MG tablet Take 1 tablet by mouth daily 11/9/24  Yes Amandeep Ramos MD   Probiotic Product (PROBIOTIC DAILY PO) Take 1 tablet by mouth daily as needed (constipation)   Yes Provider, Historical,  MD   sodium chloride 1 g tablet Take 1 tablet by mouth in the morning and at bedtime 10/29/24  Yes Amandeep Ramos MD   ondansetron (ZOFRAN-ODT) 4 MG disintegrating tablet Take 1 tablet by mouth 3 times daily as needed for Nausea or Vomiting 10/11/24  Yes Amandeep Ramos MD   albuterol sulfate HFA (VENTOLIN HFA) 108 (90 Base) MCG/ACT inhaler Inhale 2 puffs into the lungs daily as needed for Wheezing or Shortness of Breath (SOB/WHEEZING) 10/1/24  Yes Amandeep Ramos MD   cloNIDine (CATAPRES-TTS-3) 0.3 MG/24HR PTWK Place 1 patch onto the skin every 7 days 4/15/24 4/15/25 Yes Amandeep Ramos MD   omeprazole (PRILOSEC) 20 MG delayed release capsule Take 1 capsule by mouth Daily 9/7/22  Yes Bryn Ortiz MD   escitalopram (LEXAPRO) 10 MG tablet Take 1 tablet by mouth daily  Patient taking differently: Take 1 tablet by mouth daily Takes as needed 10/15/19  Yes Amandeep Ramos MD   aspirin EC 81 MG EC tablet Take 1 tablet by mouth daily  Patient taking differently: Take 1 tablet by mouth nightly 3/22/16  Yes Amandeep Ramos MD   furosemide (LASIX) 40 MG tablet Take 1 tablet by mouth daily 11/9/24   Amandeep Ramos MD   Elderberry-Vitamin C-Zinc (ELDERBERRY IMMUNE HEALTH GUMMY PO) Take 1 gum by mouth daily  Patient not taking: Reported on 11/12/2024    Bryn Ortiz MD   Handicap Placard MISC by Does not apply route Duration;4 years 11/6/23   Amandeep Ramos MD       Allergies:  Percocet [oxycodone-acetaminophen]    Social History:   reports that she has never smoked. She has never used smokeless tobacco. She reports that she does not drink alcohol and does not use drugs.     Family History:      REVIEW OF SYSTEMS:    Constitutional: there has been decline in functional capacity.     Eyes: No visual changes or diplopia.   ENT: No Headaches, hearing loss or vertigo. No mouth sores or sore throat.  Cardiovascular: as described in HPI   Respiratory: No hx of productive cough,

## 2025-02-01 NOTE — PLAN OF CARE
Problem: Chronic Conditions and Co-morbidities  Goal: Patient's chronic conditions and co-morbidity symptoms are monitored and maintained or improved  2/1/2025 1557 by Madonna Delacruz, RN  Outcome: Progressing  Flowsheets (Taken 2/1/2025 0800)  Care Plan - Patient's Chronic Conditions and Co-Morbidity Symptoms are Monitored and Maintained or Improved:   Monitor and assess patient's chronic conditions and comorbid symptoms for stability, deterioration, or improvement   Collaborate with multidisciplinary team to address chronic and comorbid conditions and prevent exacerbation or deterioration   Update acute care plan with appropriate goals if chronic or comorbid symptoms are exacerbated and prevent overall improvement and discharge  2/1/2025 0402 by Linda Fry RN  Outcome: Progressing     Problem: Discharge Planning  Goal: Discharge to home or other facility with appropriate resources  2/1/2025 1557 by Madonna Delacruz, RN  Outcome: Progressing  Flowsheets (Taken 2/1/2025 0800)  Discharge to home or other facility with appropriate resources:   Identify barriers to discharge with patient and caregiver   Arrange for needed discharge resources and transportation as appropriate   Identify discharge learning needs (meds, wound care, etc)  2/1/2025 0402 by Linda Fry RN  Outcome: Progressing     Problem: Pain  Goal: Verbalizes/displays adequate comfort level or baseline comfort level  2/1/2025 1557 by Madonna Delacruz RN  Outcome: Progressing  Flowsheets (Taken 2/1/2025 1000)  Verbalizes/displays adequate comfort level or baseline comfort level: Encourage patient to monitor pain and request assistance  2/1/2025 0402 by Linda Fry RN  Outcome: Progressing     Problem: Safety - Adult  Goal: Free from fall injury  2/1/2025 1557 by Madonna Delacruz, RN  Outcome: Progressing  Flowsheets (Taken 2/1/2025 1128)  Free From Fall Injury: Instruct family/caregiver on patient safety  2/1/2025 0402 by Linda Fry

## 2025-02-01 NOTE — PLAN OF CARE
Problem: Chronic Conditions and Co-morbidities  Goal: Patient's chronic conditions and co-morbidity symptoms are monitored and maintained or improved  2/1/2025 0402 by Linda Fry RN  Outcome: Progressing  1/31/2025 1826 by Kyle Cortez RN  Outcome: Progressing     Problem: Discharge Planning  Goal: Discharge to home or other facility with appropriate resources  2/1/2025 0402 by Linda Fry RN  Outcome: Progressing  1/31/2025 1826 by Kyle Cortez RN  Outcome: Progressing     Problem: Pain  Goal: Verbalizes/displays adequate comfort level or baseline comfort level  2/1/2025 0402 by Linda Fry RN  Outcome: Progressing  1/31/2025 1826 by Kyle Cortez RN  Outcome: Progressing     Problem: Safety - Adult  Goal: Free from fall injury  2/1/2025 0402 by Linda Fry RN  Outcome: Progressing  1/31/2025 1826 by Kyle Cortez RN  Outcome: Progressing     Problem: ABCDS Injury Assessment  Goal: Absence of physical injury  2/1/2025 0402 by Linda Fry RN  Outcome: Progressing  1/31/2025 1826 by Kyle Cortez RN  Outcome: Progressing     Problem: Skin/Tissue Integrity  Goal: Skin integrity remains intact  Description: 1.  Monitor for areas of redness and/or skin breakdown  2.  Assess vascular access sites hourly  3.  Every 4-6 hours minimum:  Change oxygen saturation probe site  4.  Every 4-6 hours:  If on nasal continuous positive airway pressure, respiratory therapy assess nares and determine need for appliance change or resting period  2/1/2025 0402 by Linda Fry RN  Outcome: Progressing  1/31/2025 1826 by Kyle Cortez RN  Outcome: Progressing

## 2025-02-02 VITALS
TEMPERATURE: 98.5 F | HEART RATE: 86 BPM | DIASTOLIC BLOOD PRESSURE: 83 MMHG | OXYGEN SATURATION: 93 % | SYSTOLIC BLOOD PRESSURE: 166 MMHG | RESPIRATION RATE: 25 BRPM | BODY MASS INDEX: 27.83 KG/M2 | HEIGHT: 68 IN

## 2025-02-02 PROCEDURE — 2500000003 HC RX 250 WO HCPCS

## 2025-02-02 PROCEDURE — 6370000000 HC RX 637 (ALT 250 FOR IP)

## 2025-02-02 PROCEDURE — 99232 SBSQ HOSP IP/OBS MODERATE 35: CPT | Performed by: PSYCHIATRY & NEUROLOGY

## 2025-02-02 PROCEDURE — 6370000000 HC RX 637 (ALT 250 FOR IP): Performed by: PSYCHIATRY & NEUROLOGY

## 2025-02-02 PROCEDURE — 6370000000 HC RX 637 (ALT 250 FOR IP): Performed by: INTERNAL MEDICINE

## 2025-02-02 RX ORDER — CARVEDILOL 6.25 MG/1
25 TABLET ORAL 2 TIMES DAILY
Qty: 60 TABLET | Refills: 3 | Status: SHIPPED | OUTPATIENT
Start: 2025-02-02

## 2025-02-02 RX ORDER — MIRTAZAPINE 7.5 MG/1
7.5 TABLET, FILM COATED ORAL NIGHTLY
Qty: 90 TABLET | Refills: 1 | Status: SHIPPED | OUTPATIENT
Start: 2025-02-02

## 2025-02-02 RX ORDER — CARVEDILOL 6.25 MG/1
6.25 TABLET ORAL 2 TIMES DAILY
Qty: 60 TABLET | Refills: 3 | Status: SHIPPED | OUTPATIENT
Start: 2025-02-02 | End: 2025-02-02

## 2025-02-02 RX ORDER — CLONIDINE 0.3 MG/24H
1 PATCH, EXTENDED RELEASE TRANSDERMAL
Status: DISCONTINUED | OUTPATIENT
Start: 2025-02-05 | End: 2025-02-02 | Stop reason: HOSPADM

## 2025-02-02 RX ORDER — ROSUVASTATIN CALCIUM 40 MG/1
40 TABLET, COATED ORAL NIGHTLY
Qty: 30 TABLET | Refills: 3 | Status: SHIPPED | OUTPATIENT
Start: 2025-02-02

## 2025-02-02 RX ORDER — ROSUVASTATIN CALCIUM 40 MG/1
40 TABLET, COATED ORAL NIGHTLY
Qty: 30 TABLET | Refills: 3 | Status: SHIPPED | OUTPATIENT
Start: 2025-02-02 | End: 2025-02-02

## 2025-02-02 RX ORDER — MIRTAZAPINE 7.5 MG/1
7.5 TABLET, FILM COATED ORAL NIGHTLY
Qty: 90 TABLET | Refills: 1 | Status: SHIPPED | OUTPATIENT
Start: 2025-02-02 | End: 2025-02-02

## 2025-02-02 RX ORDER — AMLODIPINE BESYLATE 5 MG/1
5 TABLET ORAL DAILY
Qty: 30 TABLET | Refills: 3 | Status: SHIPPED | OUTPATIENT
Start: 2025-02-03 | End: 2025-02-10 | Stop reason: SDUPTHER

## 2025-02-02 RX ORDER — CLONIDINE 0.3 MG/24H
1 PATCH, EXTENDED RELEASE TRANSDERMAL
Qty: 4 PATCH | Refills: 3 | Status: SHIPPED | OUTPATIENT
Start: 2025-02-05 | End: 2025-02-10 | Stop reason: SDUPTHER

## 2025-02-02 RX ORDER — SCOPOLAMINE 1 MG/3D
1 PATCH, EXTENDED RELEASE TRANSDERMAL
Qty: 1 PATCH | Refills: 0 | Status: SHIPPED | OUTPATIENT
Start: 2025-02-03 | End: 2025-02-05

## 2025-02-02 RX ORDER — ESCITALOPRAM OXALATE 5 MG/1
5 TABLET ORAL DAILY
Qty: 30 TABLET | Refills: 3 | Status: SHIPPED | OUTPATIENT
Start: 2025-02-03 | End: 2025-02-05 | Stop reason: SDUPTHER

## 2025-02-02 RX ADMIN — GUAIFENESIN AND DEXTROMETHORPHAN 5 ML: 100; 10 SYRUP ORAL at 03:16

## 2025-02-02 RX ADMIN — ESCITALOPRAM OXALATE 5 MG: 10 TABLET ORAL at 07:52

## 2025-02-02 RX ADMIN — CARVEDILOL 6.25 MG: 6.25 TABLET, FILM COATED ORAL at 07:52

## 2025-02-02 RX ADMIN — GUAIFENESIN AND DEXTROMETHORPHAN 5 ML: 100; 10 SYRUP ORAL at 14:04

## 2025-02-02 RX ADMIN — GUAIFENESIN AND DEXTROMETHORPHAN 5 ML: 100; 10 SYRUP ORAL at 08:38

## 2025-02-02 RX ADMIN — SODIUM CHLORIDE, PRESERVATIVE FREE 10 ML: 5 INJECTION INTRAVENOUS at 07:52

## 2025-02-02 RX ADMIN — AMLODIPINE BESYLATE 5 MG: 5 TABLET ORAL at 07:52

## 2025-02-02 NOTE — PROGRESS NOTES
C-Zinc (Mojostreet GUMMY PO) Take 1 gum by mouth daily  Patient not taking: Reported on 2024    Provider, Historical, MD   Handicap Placard MISC by Does not apply route Duration;4 years 23   Amandeep Ramos MD        Allergies:     Percocet [oxycodone-acetaminophen]    Social History:     Tobacco:    reports that she has never smoked. She has never used smokeless tobacco.  Alcohol:      reports no history of alcohol use.  Drug Use:  reports no history of drug use.    Family History:     Family History   Problem Relation Age of Onset    Heart Disease Mother     Heart Disease Father     Heart Disease Brother     Heart Disease Sister     Cancer Brother     Heart Disease Sister     Cancer Sister        Review of Systems:     Review of Systems   Constitutional:  Negative for chills, fatigue and fever.   HENT:  Negative for rhinorrhea and sore throat.    Eyes:  Negative for photophobia and visual disturbance.   Respiratory:  Negative for cough, choking, shortness of breath and wheezing.    Cardiovascular:  Negative for chest pain, palpitations and leg swelling.   Gastrointestinal:  Negative for abdominal distention, abdominal pain, constipation, diarrhea, nausea and vomiting.   Endocrine: Negative for polyuria.   Genitourinary:  Negative for difficulty urinating, dysuria, enuresis, frequency and hematuria.   Musculoskeletal:  Negative for arthralgias and back pain.   Skin:  Negative for color change and wound.   Neurological:  Positive for speech difficulty. Negative for tremors, seizures, weakness, light-headedness and headaches.   Hematological:  Does not bruise/bleed easily.   Psychiatric/Behavioral:  Negative for agitation, behavioral problems, confusion and hallucinations. The patient is not nervous/anxious.        Physical Exam:   BP (!) 164/70   Pulse 92   Temp 98.5 °F (36.9 °C) (Axillary)   Resp 18   Ht 1.727 m (5' 8\")   SpO2 94%   BMI 27.83 kg/m²   Temp (24hrs), Av.4 °F  Labs     01/31/25  0612   *   K 3.4*   CL 93*   CO2 21   BUN 15   CREATININE 0.6   GLUCOSE 124*   CALCIUM 9.1     Hemoglobin A1C   Date Value Ref Range Status   01/31/2025 6.5 (H) 4.0 - 6.0 % Final       Assessment :      Primary Problem  Ischemic stroke (HCC)    Active Hospital Problems    Diagnosis Date Noted    Atrial flutter (HCC) [I48.92] 02/01/2025     Priority: High    Ischemic stroke (HCC) [I63.9] 01/30/2025    Atrial fibrillation (HCC) [I48.91] 01/30/2025    Global aphasia [R47.01] 01/30/2025    Acute cerebrovascular accident (CVA) due to occlusion of left middle cerebral artery (HCC) [I63.512] 01/30/2025    Cerebrovascular accident (CVA) (HCC) [I63.9] 01/30/2025    Acute ischemic left middle cerebral artery (MCA) stroke (HCC) [I63.512] 01/30/2025       Radha Coburn is a 89 y.o. female with PMHx of HTN, HLD, hx of DVT, right ICA aneurysm, who presented to ED due to speech disturbance. NIHSS12. She underwent CT and CTA at that time, and showed left M3 occlusion.  CT head without IV contrast showed wedge-shaped hypodensity in left frontal lobe. She was loaded with rectal aspirin 300 mg and Plavix 300 mg via NG tube. Rosuvastatin 40 mg qhs.  MRI brain 1/30/2025: Acute infarct in supra sylvian left frontal lobe with areas of associated hemorrhage.  Remote infarct in right cerebellar hemisphere.  Stable calcified 12 mm left parafalcine lesion consistent with meningioma. CT head was 1/31: Evolving left frontal lobe cortical infarct with petechial hemorrhage. No significant mass effect or midline shift. Is on ASA alone. Pending starting AC for new onset A-Fib.     Plan:         1. Left M3 ischemic stroke with HT-2 hemorrhagic transformation. Etiology: cardioembolism.   HAS BLED score of 2. Can consider starting DOAC in 2 weeks  S/P loading dose: Rectal aspirin 300 mg and Plavix 300 mg via NG.  Continue: Aspirin 81 mg daily   A1c: 6.2%, LDL-C 89  TTE pending   Rehabilitation: PT/OT/SLP.     2. New Onset

## 2025-02-02 NOTE — DISCHARGE INSTR - COC
Continuity of Care Form    Patient Name: Radha Coburn   :  1933  MRN:  [id number]    Admit date:  2025  Discharge date:  25    Code Status Order: DNR-CCA   Advance Directives:   Advance Care Flowsheet Documentation             Admitting Physician:  Jonny Oneill MD  PCP: Amandeep Ramos MD    Discharging Nurse: GREY Peacock  Discharging Hospital Unit/Room#: 0105/0105-01  Discharging Unit Phone Number: [phone]    Emergency Contact:   Extended Emergency Contact Information  Primary Emergency Contact: Cindy Cabral  Mobile Phone: [phone]  Relation: Child  Secondary Emergency Contact: Medina Turpin  Home Phone: [phone]  Work Phone: [phone]  Mobile Phone: [phone]  Relation: Child    Past Surgical History:  Past Surgical History:   Procedure Laterality Date    APPENDECTOMY      BREAST SURGERY Left     bx    EYE SURGERY      lorena cataracts    HC INJECTION PROCEDURE FOR SACROILIAC JOINT Right 10/30/2019    SACROILIAC JOINT INJECTION WITH PIRIFORMIS performed by Claire Ni MD at [facility] OR    JOINT REPLACEMENT Left 2016    LEFT TOTAL KNEE [place]/Barb/    KNEE ARTHROPLASTY Right     TOTAL KNEE    KNEE ARTHROSCOPY Left 2016    Dr. Maurer    TOTAL HIP ARTHROPLASTY Right 2014       Immunization History:   Immunization History   Administered Date(s) Administered    COVID-19, J&J, (age 18y+), IM, 0.5 mL 2021    Influenza Whole 10/22/2007    Influenza, AFLURIA (age 3 y+), FLUZONE, (age 6 mo+), Quadv MDV, 0.5mL 10/18/2016    Influenza, FLUZONE High Dose (age 65 y+), IM, Quadv, 0.7mL 10/09/2020    Pneumococcal, PCV-13, PREVNAR 13, (age 6w+), IM, 0.5mL 2017    Pneumococcal, PPSV23, PNEUMOVAX 23, (age 2y+), SC/IM, 0.5mL 2014, 2017       Active Problems:  Patient Active Problem List   Diagnosis Code    Essential hypertension /  edema from amlodipine /   Hyponatremia from hydrochlorothiazide I10    HLA B27 (HLA B27 positive) suspect  ankylosing spondylitis / normal x-ray lumbar spine 2017 Z15.89    Right carpal tunnel syndrome G56.01    Sacroiliac joint dysfunction of right side M53.3    Piriformis syndrome of right side G57.01    Type 2 diabetes mellitus without complication, without long-term current use of insulin ([facility]) E11.9    Right internal carotid artery aneurysm I67.1    Acute respiratory failure with hypoxia J96.01    Chronic diastolic CHF (congestive heart failure), NYHA class 2 ([facility]) I50.32    Hyponatremia E87.1    Ischemic stroke ([facility]) I63.9    Atrial fibrillation ([facility]) I48.91    Global aphasia R47.01    Acute cerebrovascular accident (CVA) due to occlusion of left middle cerebral artery ([facility]) I63.512    Cerebrovascular accident (CVA) ([facility]) I63.9    Acute ischemic left middle cerebral artery (MCA) stroke ([facility]) I63.512    Atrial flutter ([facility]) I48.92       Isolation/Infection:   Isolation            No Isolation          Patient Infection Status       None to display                     Nurse Assessment:  Last Vital Signs: BP (!) 166/83   Pulse 86   Temp 98.5 °F (36.9 °C)   Resp 25   Ht 1.727 m (5' 8\")   SpO2 93%   BMI 27.83 kg/m²     Last documented pain score (0-10 scale):    Last Weight:   Wt Readings from Last 1 Encounters:   11/13/24 83 kg (183 lb)     Mental Status:   global aphasia    IV Access:  - None    Nursing Mobility/ADLs:  Walking   Assisted  Transfer  Assisted  Bathing  Assisted  Dressing  Assisted  Toileting  Assisted  Feeding  Independent  Med Admin  Assisted  Med Delivery   whole    Wound Care Documentation and Therapy:  Wound 07/24/14 Other (Comment) Knee Right two drain holes  (Active)   Number of days: 3846       Incision 02/17/14 Hip Right (Active)   Number of days: 4003       Incision 07/21/14 Knee Anterior (Active)   Number of days: 3849       Incision 01/11/16 Knee Left (Active)   Number of days: 3310       Incision 05/16/16 Knee Anterior;Left (Active)   Number of days: 3183

## 2025-02-02 NOTE — PLAN OF CARE
Problem: Chronic Conditions and Co-morbidities  Goal: Patient's chronic conditions and co-morbidity symptoms are monitored and maintained or improved  2/2/2025 1541 by Madonna Delacruz RN  Outcome: Progressing  2/2/2025 0406 by Linda Fry RN  Outcome: Progressing     Problem: Discharge Planning  Goal: Discharge to home or other facility with appropriate resources  2/2/2025 1541 by Madonna Delacruz RN  Outcome: Progressing  2/2/2025 0406 by Linda Fry RN  Outcome: Progressing     Problem: Pain  Goal: Verbalizes/displays adequate comfort level or baseline comfort level  2/2/2025 1541 by Madonna Delacruz RN  Outcome: Progressing  2/2/2025 0406 by Linda Fry RN  Outcome: Progressing     Problem: Safety - Adult  Goal: Free from fall injury  2/2/2025 1541 by Madonna Delacruz RN  Outcome: Progressing  Flowsheets (Taken 2/2/2025 1539)  Free From Fall Injury:   Instruct family/caregiver on patient safety   Based on caregiver fall risk screen, instruct family/caregiver to ask for assistance with transferring infant if caregiver noted to have fall risk factors  2/2/2025 0406 by Linda Fry RN  Outcome: Progressing     Problem: ABCDS Injury Assessment  Goal: Absence of physical injury  2/2/2025 1541 by Madonna Delacruz RN  Outcome: Progressing  Flowsheets (Taken 2/2/2025 1541)  Absence of Physical Injury: Implement safety measures based on patient assessment  2/2/2025 0406 by Linda Fry RN  Outcome: Progressing     Problem: Skin/Tissue Integrity  Goal: Skin integrity remains intact  Description: 1.  Monitor for areas of redness and/or skin breakdown  2.  Assess vascular access sites hourly  3.  Every 4-6 hours minimum:  Change oxygen saturation probe site  4.  Every 4-6 hours:  If on nasal continuous positive airway pressure, respiratory therapy assess nares and determine need for appliance change or resting period  2/2/2025 1541 by Madonna Delacruz RN  Outcome: Progressing  Flowsheets (Taken

## 2025-02-02 NOTE — PROGRESS NOTES
Endovascular Neurosurgery Progress Notes      Reason for evaluation: Left M3 occlusion    SUBJECTIVE:     NAEO from EVN perspective. Aphasia continues unchanged. Able to walk to bathroom with walker and assistance (baseline). No new focal neuro deficits.     History of Chief Complaint:      Radha Coburn is a 89 y.o. right handed female with a history of essential hypertension, chronic diastolic CHF NYHA class II, DVT, anxiety presents to [facility] for aphasia.     Patient was last known well at 10 PM on 1/29/2025.  This morning, patient's family noted that she had difficulty speaking.  They checked on her at 1 PM and was noted to have significant difficulty with speech production, and she was transported to [facility].  She underwent CT and CTA at that time, and showed left M3 occlusion.  CT head without IV contrast showed wedge-shaped hypodensity in left frontal lobe.     She was transferred to [facility] for further care.  She was seen and examined at bedside.  She had global aphasia with severe dysarthria.  NIHSS12.  She does not follow verbal commands, however will follow some mimicking actions (such as closing eyes or waving).  She is not able to follow complex commands.      CT perfusion was done and shows no mismatch, however this could be confounded by her heart failure.  In addition, she was found to have atrial fibrillation while at [facility].       LKW: 10 PM on 1/29/2025  NIHSS: 12  Modified McDonough Scale: 0  SBP: 152/108  Glucose: 127  CT head without contrast: Left frontal hypodensity.  TNK: Not indicated due to last known well greater than 4.5 hours.  Endovascular: No intervention planned.    Allergies  is allergic to percocet [oxycodone-acetaminophen].  Medications  Prior to Admission medications    Medication Sig Start Date End Date Taking? Authorizing Provider   olmesartan (BENICAR) 40 MG tablet Take 1 tablet by mouth daily 12/13/24  Yes Amandeep Ramos MD

## 2025-02-02 NOTE — PLAN OF CARE
Problem: Chronic Conditions and Co-morbidities  Goal: Patient's chronic conditions and co-morbidity symptoms are monitored and maintained or improved  2/2/2025 0406 by Linda Fry RN  Outcome: Progressing  2/1/2025 1557 by Madonna Delacruz RN  Outcome: Progressing  Flowsheets (Taken 2/1/2025 0800)  Care Plan - Patient's Chronic Conditions and Co-Morbidity Symptoms are Monitored and Maintained or Improved:   Monitor and assess patient's chronic conditions and comorbid symptoms for stability, deterioration, or improvement   Collaborate with multidisciplinary team to address chronic and comorbid conditions and prevent exacerbation or deterioration   Update acute care plan with appropriate goals if chronic or comorbid symptoms are exacerbated and prevent overall improvement and discharge     Problem: Discharge Planning  Goal: Discharge to home or other facility with appropriate resources  2/2/2025 0406 by Linda Fry RN  Outcome: Progressing  2/1/2025 1557 by Madonna Delacruz RN  Outcome: Progressing  Flowsheets (Taken 2/1/2025 0800)  Discharge to home or other facility with appropriate resources:   Identify barriers to discharge with patient and caregiver   Arrange for needed discharge resources and transportation as appropriate   Identify discharge learning needs (meds, wound care, etc)     Problem: Pain  Goal: Verbalizes/displays adequate comfort level or baseline comfort level  2/2/2025 0406 by Linda Fry RN  Outcome: Progressing  2/1/2025 1557 by Madonna Delacruz RN  Outcome: Progressing  Flowsheets (Taken 2/1/2025 1000)  Verbalizes/displays adequate comfort level or baseline comfort level: Encourage patient to monitor pain and request assistance     Problem: Safety - Adult  Goal: Free from fall injury  2/2/2025 0406 by Linda Fry RN  Outcome: Progressing  2/1/2025 1557 by Madonna Delacruz, RN  Outcome: Progressing  Flowsheets (Taken 2/1/2025 1128)  Free From Fall Injury: Instruct

## 2025-02-02 NOTE — PROGRESS NOTES
[facility] Neurology   IN-PATIENT SERVICE   [facility]    Progress Note             Date:   2/2/2025  Patient name:  Radha Coburn  Date of admission:  1/30/2025  4:37 PM  MRN:   [id number]  Account:  [id number]  YOB: 1933  PCP:    Amandeep Ramos MD  Room:   [address] [place] [address]  Code Status:    DNR-CCA    Chief Complaint:     Chief Complaint   Patient presents with    Cerebrovascular Accident       Interval hx:     The patient was seen and examined at bedside. Is vitally stable, alert, severe mixed aphasia, difficult to assess for orientation. No acute events overnight.  Daughter at bedside providing majority of history.  She states that the patient has improved which she can intermittently gives a thumbs up and thumbs down and understands raising that up and down.  Can write one-word phrases.      Blood pressure goal 100-140  Multiple readings above goal  Restarted home clonidine patch.    Remains off AC for now due to hemorrhagic transformation of new left frontal stroke    Is on aspirin alone    PT OT eval and treat    Consider PM&R consult    Family requesting DC with home care    Echo pending     Brief History of Present Illness:     Radha Coburn is a 89 y.o. female with PMHx of HTN, HLD, hx of DVT, right ICA aneurysm, who presented to ED due to speech disturbance      Patient received a call from her daughter at 1 pm yesterday at that time she had no complaints, she called again at 10 pm and patient expressed that she was feeling tired. She denies any slurred speech up until she call again at noon. On arrival to [facility], patient intermittently following commands, globally aphasic, no weakness or numbness. Initial /114. LKW: 10:00 pm 1/29/2024.    She underwent CT and CTA at that time, and showed left M3 occlusion.  CT head without IV contrast showed wedge-shaped hypodensity in left frontal lobe.     She was transferred to [facility] for further care.   05/16/2016    Dr. Maurer    TOTAL HIP ARTHROPLASTY Right 2-        Medications Prior to Admission:     Prior to Admission medications    Medication Sig Start Date End Date Taking? Authorizing Provider   olmesartan (BENICAR) 40 MG tablet Take 1 tablet by mouth daily 12/13/24  Yes Amandeep Ramos MD   hydrocortisone (PROCTO-MED HC) 2.5 % CREA rectal cream Use TID prn for hemorroids 12/13/24  Yes Amandeep Ramos MD   carvedilol (COREG) 25 MG tablet Take 1 tablet by mouth 2 times daily 12/13/24  Yes Amandeep Ramos MD   amLODIPine (NORVASC) 2.5 MG tablet Take 1 tablet by mouth daily 11/9/24  Yes Amandeep aRmos MD   Probiotic Product (PROBIOTIC DAILY PO) Take 1 tablet by mouth daily as needed (constipation)   Yes ProviderBryn MD   sodium chloride 1 g tablet Take 1 tablet by mouth in the morning and at bedtime 10/29/24  Yes Amandeep Ramos MD   ondansetron (ZOFRAN-ODT) 4 MG disintegrating tablet Take 1 tablet by mouth 3 times daily as needed for Nausea or Vomiting 10/11/24  Yes Amandeep Ramos MD   albuterol sulfate HFA (VENTOLIN HFA) 108 (90 Base) MCG/ACT inhaler Inhale 2 puffs into the lungs daily as needed for Wheezing or Shortness of Breath (SOB/WHEEZING) 10/1/24  Yes Amandeep Ramos MD   cloNIDine (CATAPRES-TTS-3) 0.3 MG/24HR PTWK Place 1 patch onto the skin every 7 days 4/15/24 4/15/25 Yes Amandeep Ramos MD   omeprazole (PRILOSEC) 20 MG delayed release capsule Take 1 capsule by mouth Daily 9/7/22  Yes ProviderBryn MD   escitalopram (LEXAPRO) 10 MG tablet Take 1 tablet by mouth daily  Patient taking differently: Take 1 tablet by mouth daily Takes as needed 10/15/19  Yes Amandeep Ramos MD   aspirin EC 81 MG EC tablet Take 1 tablet by mouth daily  Patient taking differently: Take 1 tablet by mouth nightly 3/22/16  Yes Amandeep Ramos MD   furosemide (LASIX) 40 MG tablet Take 1 tablet by mouth daily 11/9/24   Amandeep Ramos MD   [place]-[facility]

## 2025-02-03 ENCOUNTER — CARE COORDINATION (OUTPATIENT)
Dept: CARE COORDINATION | Age: 89
End: 2025-02-03

## 2025-02-03 DIAGNOSIS — R47.01 GLOBAL APHASIA: Primary | ICD-10-CM

## 2025-02-03 PROCEDURE — 1111F DSCHRG MED/CURRENT MED MERGE: CPT | Performed by: INTERNAL MEDICINE

## 2025-02-03 NOTE — PROGRESS NOTES
[facility]     Progress Note             Date:                            2/2/2025  Patient name:              Radha Coburn  Date of admission:      1/30/2025  4:37 PM  MRN:                           [id number]  Account:                      [id number]  YOB: 1933  PCP:                            Amandeep Ramos MD  Room:                          [address] [place] [address]  Code Status:               DNR-CCA     Chief Complaint:          Chief Complaint   Patient presents with    Cerebrovascular Accident         Interval hx:      The patient was seen and examined at bedside. Is vitally stable, alert, severe mixed aphasia, difficult to assess for orientation. No acute events overnight.  Daughter at bedside providing majority of history.  She states that the patient has improved which she can intermittently gives a thumbs up and thumbs down and understands raising that up and down.  Can write one-word phrases.        Blood pressure goal 100-140  Multiple readings above goal  Restarted home clonidine patch.     Remains off AC for now due to hemorrhagic transformation of new left frontal stroke     Is on aspirin alone     PT OT eval and treat     Consider PM&R consult     Family requesting DC with home care     Echo pending      Brief History of Present Illness:      Radha Coburn is a 89 y.o. female with PMHx of HTN, HLD, hx of DVT, right ICA aneurysm, who presented to ED due to speech disturbance      Patient received a call from her daughter at 1 pm yesterday at that time she had no complaints, she called again at 10 pm and patient expressed that she was feeling tired. She denies any slurred speech up until she call again at noon. On arrival to [facility], patient intermittently following commands, globally aphasic, no weakness or numbness. Initial /114. LKW: 10:00 pm 1/29/2024.     She underwent CT and CTA at that time, and showed left M3 occlusion.  CT head  Normal gait and tandem station, normal tip toes and heel walking         Investigations:       Laboratory Testing:  Recent Results   No results found for this or any previous visit (from the past 24 hour(s)).            Recent Labs     01/31/25  0612   WBC 9.5   RBC 4.41   HGB 13.1   HCT 37.9   MCV 85.9   MCH 29.7   MCHC 34.6   RDW 13.5      MPV 9.8          Recent Labs     01/31/25  0612   *   K 3.4*   CL 93*   CO2 21   BUN 15   CREATININE 0.6   GLUCOSE 124*   CALCIUM 9.1            Hemoglobin A1C   Date Value Ref Range Status   01/31/2025 6.5 (H) 4.0 - 6.0 % Final         Assessment :       Primary Problem  Ischemic stroke (HCC)           Active Hospital Problems     Diagnosis Date Noted    Atrial flutter (HCC) [I48.92] 02/01/2025       Priority: High    Ischemic stroke (HCC) [I63.9] 01/30/2025    Atrial fibrillation (HCC) [I48.91] 01/30/2025    Global aphasia [R47.01] 01/30/2025    Acute cerebrovascular accident (CVA) due to occlusion of left middle cerebral artery (HCC) [I63.512] 01/30/2025    Cerebrovascular accident (CVA) (HCC) [I63.9] 01/30/2025    Acute ischemic left middle cerebral artery (MCA) stroke (HCC) [I63.512] 01/30/2025         Radha Coburn is a 89 y.o. female with PMHx of HTN, HLD, hx of DVT, right ICA aneurysm, who presented to ED due to speech disturbance. NIHSS12. She underwent CT and CTA at that time, and showed left M3 occlusion.  CT head without IV contrast showed wedge-shaped hypodensity in left frontal lobe. She was loaded with rectal aspirin 300 mg and Plavix 300 mg via NG tube. Rosuvastatin 40 mg qhs.  MRI brain 1/30/2025: Acute infarct in supra sylvian left frontal lobe with areas of associated hemorrhage.  Remote infarct in right cerebellar hemisphere.  Stable calcified 12 mm left parafalcine lesion consistent with meningioma. CT head was 1/31: Evolving left frontal lobe cortical infarct with petechial hemorrhage. No significant mass effect or midline shift. Is on ASA

## 2025-02-03 NOTE — CARE COORDINATION
Care Transitions Note    Initial Call - Call within 2 business days of discharge: Yes    Patient Current Location:  Home: [address] [place] [id number]    Care Transition Nurse contacted the family, daughter Medina  by telephone to perform post hospital discharge assessment, verified name and  as identifiers. Provided introduction to self, and explanation of the Care Transition Nurse role.     Patient: Radha Coburn      Patient : 1933   MRN: [id number]      Reason for Admission: L- MCA CVA + aphasia, new on set AF  Discharge Date: 25    RURS: Readmission Risk Score: 16.3      [place] transfer to [facility] - for L- MCA CVA + aphasia, new on set AF. Home Care was not ordered or arranged while at hospital - daughter wants home care and will be addressed at PCP HFU on  immediately following discharge.  Medication readjusted for HTN.    Last Discharge Facility       Date Complaint Diagnosis Description Type Department Provider    25 Altered Mental Status Ischemic stroke (HCC) ED (TRANSFER) [facility] ED Sam Manzanares MD    25 Cerebrovascular Accident Cerebrovascular accident (CVA), unspecified mechanism (HCC) ... ED to Hosp-Admission (Discharged) (ADMITTED) [facility] 1A Jonny Oneill MD; Ple...            Was this an external facility discharge? No    Additional needs identified to be addressed with provider   High priority: needs home care referral - was not initiated at hospital             Method of communication with provider: chart routing.    Patients top risk factors for readmission: medical condition-L- MCA CVA + aphasia, new on set AF    Interventions to address risk factors:   Review of patient management of conditions/medications: reviewed  Appts - PCP appt  - requesting HC referral    Care Summary Note:   [place] transfer to [facility] - for L- MCA CVA + aphasia, new on set AF. Home Care was not ordered or arranged while at hospital - daughter wants home care and will

## 2025-02-06 ENCOUNTER — CARE COORDINATION (OUTPATIENT)
Dept: CARE COORDINATION | Age: 89
End: 2025-02-06

## 2025-02-06 NOTE — CARE COORDINATION
Care Transitions Note    Follow Up Call     Patient Current Location:  Home: [address] [place] [id number]    [facility] Care Coordinator contacted the patient by telephone. Verified name and  as identifiers.    Additional needs identified to be addressed with provider   Patient's daughter states she wants a referral to home care thought she discussed with you at appointment yesterday. Please send referral to home care provider for nursing visits.                  Method of communication with provider: chart routing.    Care Summary Note: Writer spoke to patient's daughter Cindy she states that patient is doing as good as expected and is not worse. She is getting around still has some issues swallowing but they are making sure things are mashed up good. Able to go to rest room. Hospital follow up completed trazodone was 100 mg was added she states patient was up every other hour. Advised that it could take some time to adjust. If she does not see improvement reach out to provider. Cipro 500 mg added bid x 5 day, scopolamine patch sent to mail order pharmacy . Referrals were provided for OT and PT. Cindy states she believes that she asked for a referral for home care. Writer will send message to provider.     Plan of care updates since last contact:  Communication with providers: sent message to provider , discussed HFU OT/PT       Advance Care Planning:   Does patient have an Advance Directive: reviewed during previous call, see note. .    Medication Review:  No changes since last call.     Remote Patient Monitoring:  Offered patient enrollment in the Remote Patient Monitoring (RPM) program for in-home monitoring: Patient is not eligible for RPM program because: affiliate provider.    Assessments:  Care Transitions Subsequent and Final Call    Subsequent and Final Calls  Do you have any ongoing symptoms?: No  Have your medications changed?: Yes  Do you have any questions related to your medications?: No  Do you

## 2025-02-06 NOTE — CARE COORDINATION
Care Transitions Note    Follow Up Call     Attempted to reach patient for transitions of care follow up.  Unable to reach patient.      Outreach Attempts:# 1   HIPAA compliant voicemail left for family, Daughter Medina .     Care Summary Note: 1st attempt     Follow Up Appointment:   Future Appointments         Provider Specialty Dept Phone    3/19/2025 2:10 PM Amandeep Ramos MD Internal Medicine [phone]    4/11/2025 10:30 AM Girma Gallegos MD Neurology [phone]    4/21/2025 2:30 PM Amandeep Ramos MD Internal Medicine [phone]    8/27/2025 11:30 AM Crow Rolle MD Neurology [phone]            Plan for follow-up on next business day.  based on severity of symptoms and risk factors. Plan for next call: eassess aphasia, alertness and cognitive state  follow-up appointment-review PCP HFU and check if HC referral initiated  medication management-any new changes?  referrals-check HC referral      Socorro Lucia LPN

## 2025-02-06 NOTE — CARE COORDINATION
Writer returned call to patient's daughter states she's doing well as to be expected but patient is in the restroom. Writer advised that she could call writer back if she needed to. Advised to leave VM if she does not reach writer.   Socorro Schultz LPN   Care Transitions Nurse  Cell

## 2025-02-07 NOTE — PROGRESS NOTES
Physician Progress Note      PATIENT:               JANET FINK  CSN #:                  [id number]  :                       1933  ADMIT DATE:       2025 4:37 PM  DISCH DATE:        2025 3:43 PM  RESPONDING  PROVIDER #:        JOSE L GIL          QUERY TEXT:    Pt admitted with CVA . Noted documentation of  HT-2 hemorrhagic transformation   in neurology note on . After further review, can the HT-2 hemorrhagic   transformation be further specified as;    The medical record reflects the following:  Risk Factors: Patient presented to ED due to speech disturbance. She underwent   CT and CTA at that time and showed left M3 occlusion.  Clinical Indicators: The final Neurology progress note documents, Acute CVA    due to occlusion of left middle cerebral artery. Patient was diagnosed with   Left M3 ischemic stroke with HT-2 hemorrhagic transformation. Etiology: cardio   embolism.   CT brain-acute to subacute infarction of the left frontal lobe.   brain MRI acute infarct in the suprasylvian left frontal lobe with areas   of  associated hemorrhage. table calcified 12 mm left parafalcine lesion most   consistent with a meningioma. Per final Neurology progress note, \"HAS BLED   score of 2.  Treatment: consider starting DOAC in 2 weeks. S/P loading dose: \"Rectal   aspirin 300 mg and Plavix 300 mg via NG. Aspirin 81 mg daily.\"  Consults from   endovascular neurosurgery cardiology and telestroke, CT and MRI of the brain  Options provided:  -- Clinically significant HT-2 hemorrhagic transformation  -- Clinically insignificant HT-2 hemorrhagic transformation  -- Other - I will add my own diagnosis  -- Disagree - Not applicable / Not valid  -- Disagree - Clinically unable to determine / Unknown  -- Refer to Clinical Documentation Reviewer    PROVIDER RESPONSE TEXT:    Clinically significant HT-2 hemorrhagic transformation    Query created by: Lyubov Trinidad on 2025 9:36 AM      Electronically

## 2025-02-13 ENCOUNTER — CARE COORDINATION (OUTPATIENT)
Dept: CARE COORDINATION | Age: 89
End: 2025-02-13

## 2025-02-13 NOTE — CARE COORDINATION
Care Transitions Note    Follow Up Call     Patient Current Location:  Home: [address] [place] [id number]    [facility] Care Coordinator contacted the patient by telephone. Verified name and  as identifiers.    Additional needs identified to be addressed with provider   No needs identified                 Method of communication with provider: none.    Care Summary Note: Writer spoke to patient's daughter Cindy she is on he way out the door. She reports that home care nursing and PT has started. Writer asked how her mom is doing she states better. She states she will have to call writer back later call was ended.     Plan of care updates since last contact:  Reports mom doing better PT/home care started        Advance Care Planning:   Does patient have an Advance Directive: reviewed during previous call, see note. .    Medication Review:  No changes since last call.     Remote Patient Monitoring:  Offered patient enrollment in the Remote Patient Monitoring (RPM) program for in-home monitoring: Patient is not eligible for RPM program because: affiliate provider.    Assessments:  Care Transitions Subsequent and Final Call    Subsequent and Final Calls  Are you currently active with any services?: Home Health  Care Transitions Interventions  Other Interventions:              Follow Up Appointment:   Reviewed upcoming appointment(s).  Future Appointments         Provider Specialty Dept Phone    3/19/2025 2:10 PM Amandeep Ramos MD Internal Medicine [phone]    2025 10:30 AM Girma Gallegos MD Neurology [phone]    2025 2:30 PM Amandeep Ramos MD Internal Medicine [phone]    2025 11:30 AM Crow Rolle MD Neurology [phone]            [facility] Care Coordinator provided contact information.  Plan for follow-up call in 6-10 days based on severity of symptoms and risk factors.  Plan for next call: symptom management-Is she sleeping better? Finish Cipro?       Socorro Lucia LPN      Emergency Dept.

## 2025-02-20 ENCOUNTER — APPOINTMENT (OUTPATIENT)
Dept: CT IMAGING | Age: 89
End: 2025-02-20
Payer: MEDICARE

## 2025-02-20 ENCOUNTER — HOSPITAL ENCOUNTER (EMERGENCY)
Age: 89
Discharge: HOME | End: 2025-02-20
Attending: STUDENT IN AN ORGANIZED HEALTH CARE EDUCATION/TRAINING PROGRAM
Payer: MEDICARE

## 2025-02-20 VITALS
TEMPERATURE: 97.5 F | HEIGHT: 67 IN | RESPIRATION RATE: 12 BRPM | DIASTOLIC BLOOD PRESSURE: 49 MMHG | OXYGEN SATURATION: 94 % | BODY MASS INDEX: 27.94 KG/M2 | HEART RATE: 63 BPM | WEIGHT: 178 LBS | SYSTOLIC BLOOD PRESSURE: 108 MMHG

## 2025-02-20 DIAGNOSIS — M48.54XA NON-TRAUMATIC COMPRESSION FRACTURE OF T6 THORACIC VERTEBRA, INITIAL ENCOUNTER (HCC): Primary | ICD-10-CM

## 2025-02-20 LAB
ALBUMIN SERPL-MCNC: 3.7 G/DL (ref 3.5–5.2)
ALBUMIN/GLOB SERPL: 1.3 {RATIO} (ref 1–2.5)
ALP SERPL-CCNC: 170 U/L (ref 35–104)
ALT SERPL-CCNC: 22 U/L (ref 10–35)
AMORPH SED URNS QL MICRO: ABNORMAL
ANION GAP SERPL CALCULATED.3IONS-SCNC: 9 MMOL/L (ref 9–16)
AST SERPL-CCNC: 28 U/L (ref 10–35)
BACTERIA URNS QL MICRO: ABNORMAL
BASOPHILS # BLD: 0 K/UL (ref 0–0.2)
BASOPHILS NFR BLD: 0 % (ref 0–2)
BILIRUB SERPL-MCNC: 0.8 MG/DL (ref 0–1.2)
BILIRUB UR QL STRIP: NEGATIVE
BUN SERPL-MCNC: 12 MG/DL (ref 8–23)
BUN/CREAT SERPL: 17 (ref 9–20)
CALCIUM SERPL-MCNC: 9.3 MG/DL (ref 8.6–10.4)
CHLORIDE SERPL-SCNC: 90 MMOL/L (ref 98–107)
CLARITY UR: CLEAR
CO2 SERPL-SCNC: 28 MMOL/L (ref 20–31)
COLOR UR: YELLOW
CREAT SERPL-MCNC: 0.7 MG/DL (ref 0.5–0.9)
EOSINOPHIL # BLD: 0.35 K/UL (ref 0–0.44)
EOSINOPHILS RELATIVE PERCENT: 3 % (ref 1–4)
EPI CELLS #/AREA URNS HPF: ABNORMAL /HPF (ref 0–25)
ERYTHROCYTE [DISTWIDTH] IN BLOOD BY AUTOMATED COUNT: 14.2 % (ref 11.8–14.4)
GFR, ESTIMATED: 82 ML/MIN/1.73M2
GLUCOSE SERPL-MCNC: 135 MG/DL (ref 74–99)
GLUCOSE UR STRIP-MCNC: NEGATIVE MG/DL
HCT VFR BLD AUTO: 39.3 % (ref 36.3–47.1)
HGB BLD-MCNC: 13.1 G/DL (ref 11.9–15.1)
HGB UR QL STRIP.AUTO: NEGATIVE
IMM GRANULOCYTES # BLD AUTO: 0 K/UL (ref 0–0.3)
IMM GRANULOCYTES NFR BLD: 0 %
KETONES UR STRIP-MCNC: NEGATIVE MG/DL
LEUKOCYTE ESTERASE UR QL STRIP: NEGATIVE
LIPASE SERPL-CCNC: 34 U/L (ref 13–60)
LYMPHOCYTES NFR BLD: 1.65 K/UL (ref 1.1–3.7)
LYMPHOCYTES RELATIVE PERCENT: 14 % (ref 24–43)
MCH RBC QN AUTO: 29.8 PG (ref 25.2–33.5)
MCHC RBC AUTO-ENTMCNC: 33.3 G/DL (ref 28.4–34.8)
MCV RBC AUTO: 89.3 FL (ref 82.6–102.9)
MONOCYTES NFR BLD: 1.42 K/UL (ref 0.1–1.2)
MONOCYTES NFR BLD: 12 % (ref 3–12)
MORPHOLOGY: NORMAL
NEUTROPHILS NFR BLD: 71 % (ref 36–65)
NEUTS SEG NFR BLD: 8.38 K/UL (ref 1.5–8.1)
NITRITE UR QL STRIP: NEGATIVE
NRBC BLD-RTO: 0 PER 100 WBC
PH UR STRIP: 7 [PH] (ref 5–9)
PLATELET # BLD AUTO: 271 K/UL (ref 138–453)
PMV BLD AUTO: 9.3 FL (ref 8.1–13.5)
POTASSIUM SERPL-SCNC: 3.7 MMOL/L (ref 3.7–5.3)
PROT SERPL-MCNC: 6.7 G/DL (ref 6.6–8.7)
PROT UR STRIP-MCNC: NEGATIVE MG/DL
RBC # BLD AUTO: 4.4 M/UL (ref 3.95–5.11)
RBC #/AREA URNS HPF: ABNORMAL /HPF (ref 0–2)
SODIUM SERPL-SCNC: 127 MMOL/L (ref 136–145)
SP GR UR STRIP: 1.01 (ref 1.01–1.02)
TROPONIN I SERPL HS-MCNC: 18 NG/L (ref 0–14)
UROBILINOGEN UR STRIP-ACNC: NORMAL EU/DL (ref 0–1)
WBC #/AREA URNS HPF: ABNORMAL /HPF (ref 0–5)
WBC OTHER # BLD: 11.8 K/UL (ref 3.5–11.3)

## 2025-02-20 PROCEDURE — 85025 COMPLETE CBC W/AUTO DIFF WBC: CPT

## 2025-02-20 PROCEDURE — 96374 THER/PROPH/DIAG INJ IV PUSH: CPT

## 2025-02-20 PROCEDURE — 6360000004 HC RX CONTRAST MEDICATION: Performed by: STUDENT IN AN ORGANIZED HEALTH CARE EDUCATION/TRAINING PROGRAM

## 2025-02-20 PROCEDURE — 81001 URINALYSIS AUTO W/SCOPE: CPT

## 2025-02-20 PROCEDURE — 93005 ELECTROCARDIOGRAM TRACING: CPT | Performed by: STUDENT IN AN ORGANIZED HEALTH CARE EDUCATION/TRAINING PROGRAM

## 2025-02-20 PROCEDURE — 83690 ASSAY OF LIPASE: CPT

## 2025-02-20 PROCEDURE — 80053 COMPREHEN METABOLIC PANEL: CPT

## 2025-02-20 PROCEDURE — 6360000002 HC RX W HCPCS: Performed by: STUDENT IN AN ORGANIZED HEALTH CARE EDUCATION/TRAINING PROGRAM

## 2025-02-20 PROCEDURE — 99285 EMERGENCY DEPT VISIT HI MDM: CPT

## 2025-02-20 PROCEDURE — 84484 ASSAY OF TROPONIN QUANT: CPT

## 2025-02-20 PROCEDURE — 96376 TX/PRO/DX INJ SAME DRUG ADON: CPT

## 2025-02-20 PROCEDURE — 96375 TX/PRO/DX INJ NEW DRUG ADDON: CPT

## 2025-02-20 PROCEDURE — 74174 CTA ABD&PLVS W/CONTRAST: CPT

## 2025-02-20 RX ORDER — ONDANSETRON 2 MG/ML
4 INJECTION INTRAMUSCULAR; INTRAVENOUS ONCE
Status: COMPLETED | OUTPATIENT
Start: 2025-02-20 | End: 2025-02-20

## 2025-02-20 RX ORDER — HYDROCODONE BITARTRATE AND ACETAMINOPHEN 5; 325 MG/1; MG/1
0.5 TABLET ORAL EVERY 8 HOURS PRN
Qty: 5 TABLET | Refills: 0 | Status: SHIPPED | OUTPATIENT
Start: 2025-02-20 | End: 2025-02-21 | Stop reason: SDUPTHER

## 2025-02-20 RX ORDER — LIDOCAINE 4 G/G
1 PATCH TOPICAL DAILY
Qty: 30 PATCH | Refills: 0 | Status: SHIPPED | OUTPATIENT
Start: 2025-02-20 | End: 2025-03-22

## 2025-02-20 RX ORDER — IOPAMIDOL 755 MG/ML
100 INJECTION, SOLUTION INTRAVASCULAR
Status: COMPLETED | OUTPATIENT
Start: 2025-02-20 | End: 2025-02-20

## 2025-02-20 RX ORDER — FENTANYL CITRATE 50 UG/ML
25 INJECTION, SOLUTION INTRAMUSCULAR; INTRAVENOUS ONCE
Status: COMPLETED | OUTPATIENT
Start: 2025-02-20 | End: 2025-02-20

## 2025-02-20 RX ORDER — MORPHINE SULFATE 2 MG/ML
2 INJECTION, SOLUTION INTRAMUSCULAR; INTRAVENOUS ONCE
Status: COMPLETED | OUTPATIENT
Start: 2025-02-20 | End: 2025-02-20

## 2025-02-20 RX ORDER — ONDANSETRON 4 MG/1
4 TABLET, ORALLY DISINTEGRATING ORAL 3 TIMES DAILY PRN
Qty: 21 TABLET | Refills: 0 | Status: SHIPPED | OUTPATIENT
Start: 2025-02-20

## 2025-02-20 RX ADMIN — FENTANYL CITRATE 25 MCG: 50 INJECTION INTRAMUSCULAR; INTRAVENOUS at 00:43

## 2025-02-20 RX ADMIN — ONDANSETRON 4 MG: 2 INJECTION, SOLUTION INTRAMUSCULAR; INTRAVENOUS at 00:44

## 2025-02-20 RX ADMIN — ONDANSETRON 4 MG: 2 INJECTION, SOLUTION INTRAMUSCULAR; INTRAVENOUS at 02:48

## 2025-02-20 RX ADMIN — MORPHINE SULFATE 2 MG: 2 INJECTION, SOLUTION INTRAMUSCULAR; INTRAVENOUS at 02:49

## 2025-02-20 RX ADMIN — IOPAMIDOL 100 ML: 755 INJECTION, SOLUTION INTRAVENOUS at 01:53

## 2025-02-20 ASSESSMENT — PAIN DESCRIPTION - LOCATION
LOCATION: BACK
LOCATION: BACK

## 2025-02-20 ASSESSMENT — LIFESTYLE VARIABLES
HOW OFTEN DO YOU HAVE A DRINK CONTAINING ALCOHOL: NEVER
HOW MANY STANDARD DRINKS CONTAINING ALCOHOL DO YOU HAVE ON A TYPICAL DAY: PATIENT DOES NOT DRINK

## 2025-02-20 ASSESSMENT — PAIN SCALES - GENERAL
PAINLEVEL_OUTOF10: 7
PAINLEVEL_OUTOF10: 9

## 2025-02-20 ASSESSMENT — PAIN DESCRIPTION - ORIENTATION
ORIENTATION: MID
ORIENTATION: UPPER;MID

## 2025-02-20 ASSESSMENT — PAIN DESCRIPTION - DESCRIPTORS
DESCRIPTORS: SHARP
DESCRIPTORS: SHARP

## 2025-02-20 ASSESSMENT — PAIN DESCRIPTION - ONSET: ONSET: ON-GOING

## 2025-02-20 ASSESSMENT — PAIN - FUNCTIONAL ASSESSMENT: PAIN_FUNCTIONAL_ASSESSMENT: ACTIVITIES ARE NOT PREVENTED

## 2025-02-20 ASSESSMENT — PAIN DESCRIPTION - PAIN TYPE: TYPE: ACUTE PAIN

## 2025-02-20 ASSESSMENT — PAIN DESCRIPTION - FREQUENCY: FREQUENCY: CONTINUOUS

## 2025-02-20 NOTE — ED NOTES
Patient lives with daughter for primary care taker. Daughter state she not sure what medications patient takes but that patient was recently admitted. States that she can bring in medications tomorrow for if any reason she would have to be admitted.

## 2025-02-20 NOTE — ED PROVIDER NOTES
time.      Motor: No weakness.      Comments: Patient is able to write her name location and month.  She does follow my verbal commands.  She is able to flex her hips bilaterally.  Moves all extremities antigravity 4 out of 5           FORMAL DIAGNOSTIC RESULTS     RADIOLOGY: Interpretation per the Radiologist below, if available at the time of this note (none if blank):    CT LUMBAR SPINE BONY RECONSTRUCTION   Final Result   1. Mild T6 compression deformity is a new finding with 20% height loss.   Subtle superior endplate deformity of T7 is also a new finding.   2. No acute osseous abnormality identified in the lumbar spine.         CT THORACIC SPINE BONY RECONSTRUCTION   Final Result   1. Mild T6 compression deformity is a new finding with 20% height loss.   Subtle superior endplate deformity of T7 is also a new finding.   2. No acute osseous abnormality identified in the lumbar spine.         CTA CHEST ABDOMEN PELVIS W CONTRAST   Final Result   1. No acute aortic abnormality, aneurysm or dissection.      2. No evidence for acute pulmonary embolism.      3.  Mild interstitial edema.      4. No acute abnormality identified in the abdomen or pelvis.      6.  Mild compression deformities of T6 and subtle superior endplate deformity   of T7 are new findings since 11/07/2024.             LABS: (none if blank)  Labs Reviewed   CBC WITH AUTO DIFFERENTIAL - Abnormal; Notable for the following components:       Result Value    WBC 11.8 (*)     Neutrophils % 71 (*)     Lymphocytes % 14 (*)     Neutrophils Absolute 8.38 (*)     Monocytes Absolute 1.42 (*)     All other components within normal limits   COMPREHENSIVE METABOLIC PANEL - Abnormal; Notable for the following components:    Sodium 127 (*)     Chloride 90 (*)     Glucose 135 (*)     Alkaline Phosphatase 170 (*)     All other components within normal limits   TROPONIN - Abnormal; Notable for the following components:    Troponin, High Sensitivity 18 (*)     All other  grossly to date/person/place

## 2025-02-20 NOTE — ED NOTES
Patient's daughter at bedside. States she has been massaging back all day. States they used ice and heat on it and patient wrote on a piece of paper, \"911 back broke.\"

## 2025-02-20 NOTE — DISCHARGE INSTRUCTIONS
Follow up with the listed physician or medical clinic within 24-72 hours.  Return to the Emergency Department if you develop any new or concerning symptoms or if your are getting worse    Take the Odansetron (Zofran) 30 minutes prior to giving Hydrocodone-acetaminophen (NORCO)

## 2025-02-21 ENCOUNTER — CARE COORDINATION (OUTPATIENT)
Dept: CARE COORDINATION | Age: 89
End: 2025-02-21

## 2025-02-21 LAB
EKG Q-T INTERVAL: 490 MS
EKG QRS DURATION: 92 MS
EKG QTC CALCULATION (BAZETT): 497 MS
EKG R AXIS: -32 DEGREES
EKG T AXIS: 89 DEGREES
EKG VENTRICULAR RATE: 62 BPM

## 2025-02-21 PROCEDURE — 93010 ELECTROCARDIOGRAM REPORT: CPT | Performed by: FAMILY MEDICINE

## 2025-02-24 NOTE — CARE COORDINATION
Care Transitions Note    Follow Up Call  ED F/U call #1 attempt - compression fracture T6    Attempted to reach patient, family, daughter  for transitions of care follow up.  Unable to reach patient, family,   .      Outreach Attempts:   Multiple attempts to contact patient, family, daughter at phone numbers on file.        Patient: Radha Coburn                                 Patient : 1933   MRN: [id number]                               Reason for Admission: L- MCA CVA + aphasia, new on set AF  Discharge Date: 25           RURS: Readmission Risk Score: 16.3        [place] transfer to [facility] - for L- MCA CVA + aphasia, new on set AF. Home Care was not ordered or arranged while at hospital - daughter wants home care and will be addressed at PCP HFU on  immediately following discharge.  Medication readjusted for HTN.    Follow Up Appointment:   Future Appointments         Provider Specialty Dept Phone    3/19/2025 2:10 PM Amandeep Ramos MD Internal Medicine [phone]    2025 10:30 AM Girma Gallegos MD Neurology [phone]    2025 2:30 PM Amandeep Ramos MD Internal Medicine [phone]    2025 11:30 AM Crow Rolle MD Neurology [phone]            Plan for follow-up call in 2-5 days based on severity of symptoms and risk factors. Plan for next call: symptom management-check back pain, mobility  follow-up appointment-remind next PCP appt    Kerri Tse RN

## 2025-02-25 ENCOUNTER — CARE COORDINATION (OUTPATIENT)
Dept: CARE COORDINATION | Age: 89
End: 2025-02-25

## 2025-02-25 NOTE — CARE COORDINATION
Care Transitions Note    Follow Up Call ED f/u    Patient Current Location:  Home: daughter Cindy's home  Care Transition Nurse contacted the  daughter, Gideon  by telephone. Verified name and  as identifiers.    Patient: Radha Coburn                                 Patient : 1933   MRN: [id number]                               Reason for Admission: L- MCA CVA + aphasia, new on set AF  Discharge Date: 25           RURS: Readmission Risk Score: 16.3     [place] transfer to [facility] - for L- MCA CVA + aphasia, new on set AF. Home Care was not ordered or arranged while at hospital - daughter wants home care and will be addressed at PCP HFU on  immediately following discharge.  Medication readjusted for HTN.     ED for nontraumatic compression fx of T6    Additional needs identified to be addressed with provider   Was seen by new spine doctor today - wearing back brace now to promote lhealing of T6 Fx as opposed to risky kyphoplasty for patient at this time according to daughter                   Method of communication with provider: none.    Care Summary Note:   I contacted daughter gideon who stayed with patient since patient initially discharged back to home on 25.  She has since gone back to her home in [place] and patient has moved in with her local daughter, Cindy.     Gideon informs me that patient was seen by new neuro surg in [place] today and is now wearing a back brace for her T6 fracture.  Patient and family decided on treatment with back brace for healing as opposed to kyphoplasty which has the risks of anesthesia.  Patient is now working with Aury Parmar  for PT/OT/speech and skilled nursing visits with some improvement.  We discussed course of transition and daughter Gideon agrees to ACM referral after next week's call to daughterCindy.    Plan for next call: symptom management-check if back symptoms are improving with new back brace  referral to ambulatory care

## 2025-03-01 ENCOUNTER — APPOINTMENT (OUTPATIENT)
Dept: GENERAL RADIOLOGY | Age: 89
End: 2025-03-01
Payer: MEDICARE

## 2025-03-01 ENCOUNTER — HOSPITAL ENCOUNTER (EMERGENCY)
Age: 89
Discharge: HOME | End: 2025-03-01
Payer: MEDICARE

## 2025-03-01 VITALS
DIASTOLIC BLOOD PRESSURE: 86 MMHG | RESPIRATION RATE: 18 BRPM | TEMPERATURE: 98.2 F | WEIGHT: 174 LBS | HEIGHT: 67 IN | BODY MASS INDEX: 27.31 KG/M2 | OXYGEN SATURATION: 97 % | SYSTOLIC BLOOD PRESSURE: 127 MMHG | HEART RATE: 70 BPM

## 2025-03-01 DIAGNOSIS — R13.10 PAIN WITH SWALLOWING: Primary | ICD-10-CM

## 2025-03-01 PROCEDURE — 99283 EMERGENCY DEPT VISIT LOW MDM: CPT

## 2025-03-01 PROCEDURE — 6370000000 HC RX 637 (ALT 250 FOR IP)

## 2025-03-01 PROCEDURE — 71045 X-RAY EXAM CHEST 1 VIEW: CPT

## 2025-03-01 RX ORDER — ONDANSETRON 4 MG/1
TABLET, ORALLY DISINTEGRATING ORAL
Status: COMPLETED
Start: 2025-03-01 | End: 2025-03-01

## 2025-03-01 RX ORDER — HYDROCODONE BITARTRATE AND ACETAMINOPHEN 5; 325 MG/1; MG/1
1 TABLET ORAL EVERY 4 HOURS PRN
Qty: 18 TABLET | Refills: 0 | Status: SHIPPED | OUTPATIENT
Start: 2025-03-01 | End: 2025-03-04

## 2025-03-01 RX ORDER — ONDANSETRON 4 MG/1
4 TABLET, ORALLY DISINTEGRATING ORAL ONCE
Status: COMPLETED | OUTPATIENT
Start: 2025-03-01 | End: 2025-03-01

## 2025-03-01 RX ADMIN — LIDOCAINE HYDROCHLORIDE: 20 SOLUTION ORAL at 20:42

## 2025-03-01 RX ADMIN — ONDANSETRON 4 MG: 4 TABLET, ORALLY DISINTEGRATING ORAL at 20:49

## 2025-03-01 ASSESSMENT — PAIN - FUNCTIONAL ASSESSMENT: PAIN_FUNCTIONAL_ASSESSMENT: NONE - DENIES PAIN

## 2025-03-02 NOTE — ED PROVIDER NOTES
Determinants of health, Records Reviewed, DDx, testing done/not done, ED Course, Reassessment, disposition considerations/shared decision making with patient, consults, disposition:      Medical Decision Making/ED COURSE:    I interpreted findings during patient's stay.   Records reviewed as detailed on the note.    History From: Patient's daughter    Limitations to history : None  Social Determinants : None    Chronic Conditions affecting care:    has a past medical history of Allergic rhinitis, Anxiety, Arthritis, DVT (deep venous thrombosis) ([facility]) (01/01/1970), Essential hypertension, Hyperlipidemia, Hypertensive urgency (06/28/2023), Impaired fasting glucose (01/01/2010), Osteoarthritis of right hip / Total Right Arthroplasty 2014 (02/17/2014), Osteoarthritis of right knee / Right TKA 2014 (08/01/2014), PONV (postoperative nausea and vomiting), Shingles (01/01/2009), and Stroke ([facility]).     Radha Coburn is a 89 y.o. female     Vital signs /86   Pulse 70   Temp 98.2 °F (36.8 °C)   Resp 18   Ht 1.702 m (5' 7\")   Wt 78.9 kg (174 lb)   SpO2 97%   BMI 27.25 kg/m²   While in the ED patient was afebrile, nontoxic-appearing, in no respiratory distress.   Physical exam remarkable forOropharynx clear and moist, handling secretions, no trismus, no ulcers or injuries to the oropharynx. RRR.  No murmurs.  Bilateral breath sounds are rhonchi.  Patient is in no distress.  She is sipping on water with no issues.  Ddx: Working diagnosis include but not limited to esophagitis, swallowing deficits  CXR with no obvious effusions or consolidations concerning pna, no ptx     Patient administered:  Orders Placed This Encounter   Medications    aluminum & magnesium hydroxide-simethicone (MAALOX PLUS) 30 mL, lidocaine viscous hcl (XYLOCAINE) 5 mL (GI COCKTAIL)    ondansetron (ZOFRAN-ODT) 4 MG disintegrating tablet     Patricia Schneider: cabinet override    ondansetron (ZOFRAN-ODT) disintegrating tablet 4 mg

## 2025-03-05 ENCOUNTER — CARE COORDINATION (OUTPATIENT)
Dept: CARE COORDINATION | Age: 89
End: 2025-03-05

## 2025-03-05 NOTE — CARE COORDINATION
Care Transitions Note    Final Call  and ED f/u    Patient Current Location:   moved in with Cindy mclean    Care Transition Nurse contacted the family, caridad White  by telephone. Verified name and  as identifiers.    Patient: Radha Coburn                                 Patient : 1933   MRN: [id number]                               Reason for Admission: L- MCA CVA + aphasia, new on set AF  Discharge Date: 25           RURS: Readmission Risk Score: 16.3      [facility] transfer to [facility] - for L- MCA CVA + aphasia, new on set AF. Home Care was not ordered or arranged while at hospital - daughter wants home care and will be addressed at PCP HFU on  immediately following discharge.  Medication readjusted for HTN.      ED for nontraumatic compression fx of T6  3/2 ED for difficulty swallowing which has now improved and patient is back to eating    Patient graduated from the Care Transitions program on 3/5/25.  Patient/family unable to progress towards self management. , but living with Cindy mclean now.  Continues with [facility] on days that patient agrees to have visits - continues PT.      Advance Care Planning:   Does patient have an Advance Directive: reviewed and current.    Handoff:   Patient was not referred to the ACM team due to daughterCindy and patient declined services.      Care Summary Note:   I spoke with Cindy mclean with whom patient now lives.  Cindy says that patient has up and down days - on \"down days\" she doesn't want to eat or have PT and said she wanted hospice.  On \"up days\" Belen is eating, more active and accepts Home Care visits + PT.  Cindy says she really has \"mood swings.\"    Belen has refused to even get the back brace which was ordered initially after her thoracic compression fracture.  Does not require pain medication at this time according to daughter and she will not take norco.    We discussed timing of next PCP appt which is

## 2025-03-12 ENCOUNTER — HOSPITAL ENCOUNTER (EMERGENCY)
Age: 89
Discharge: HOME | End: 2025-03-12
Attending: EMERGENCY MEDICINE
Payer: MEDICARE

## 2025-03-12 ENCOUNTER — APPOINTMENT (OUTPATIENT)
Dept: GENERAL RADIOLOGY | Age: 89
End: 2025-03-12
Payer: MEDICARE

## 2025-03-12 VITALS
RESPIRATION RATE: 16 BRPM | HEART RATE: 77 BPM | SYSTOLIC BLOOD PRESSURE: 133 MMHG | OXYGEN SATURATION: 92 % | TEMPERATURE: 97.3 F | DIASTOLIC BLOOD PRESSURE: 92 MMHG

## 2025-03-12 DIAGNOSIS — I50.23 ACUTE ON CHRONIC SYSTOLIC CONGESTIVE HEART FAILURE (HCC): Primary | ICD-10-CM

## 2025-03-12 LAB
ANION GAP SERPL CALCULATED.3IONS-SCNC: 9 MMOL/L (ref 9–16)
BASOPHILS # BLD: 0.04 K/UL (ref 0–0.2)
BASOPHILS NFR BLD: 1 % (ref 0–2)
BNP SERPL-MCNC: 1919 PG/ML (ref 0–450)
BUN SERPL-MCNC: 15 MG/DL (ref 8–23)
BUN/CREAT SERPL: 21 (ref 9–20)
CALCIUM SERPL-MCNC: 9 MG/DL (ref 8.6–10.4)
CHLORIDE SERPL-SCNC: 94 MMOL/L (ref 98–107)
CO2 SERPL-SCNC: 30 MMOL/L (ref 20–31)
CREAT SERPL-MCNC: 0.7 MG/DL (ref 0.5–0.9)
EOSINOPHIL # BLD: 0.13 K/UL (ref 0–0.44)
EOSINOPHILS RELATIVE PERCENT: 2 % (ref 1–4)
ERYTHROCYTE [DISTWIDTH] IN BLOOD BY AUTOMATED COUNT: 15 % (ref 11.8–14.4)
FLUAV AG SPEC QL: NEGATIVE
FLUBV AG SPEC QL: NEGATIVE
GFR, ESTIMATED: 82 ML/MIN/1.73M2
GLUCOSE SERPL-MCNC: 135 MG/DL (ref 74–99)
HCT VFR BLD AUTO: 41.4 % (ref 36.3–47.1)
HGB BLD-MCNC: 13.9 G/DL (ref 11.9–15.1)
IMM GRANULOCYTES # BLD AUTO: <0.03 K/UL (ref 0–0.3)
IMM GRANULOCYTES NFR BLD: 0 %
LYMPHOCYTES NFR BLD: 1.22 K/UL (ref 1.1–3.7)
LYMPHOCYTES RELATIVE PERCENT: 17 % (ref 24–43)
MCH RBC QN AUTO: 30.5 PG (ref 25.2–33.5)
MCHC RBC AUTO-ENTMCNC: 33.6 G/DL (ref 28.4–34.8)
MCV RBC AUTO: 91 FL (ref 82.6–102.9)
MONOCYTES NFR BLD: 0.83 K/UL (ref 0.1–1.2)
MONOCYTES NFR BLD: 11 % (ref 3–12)
NEUTROPHILS NFR BLD: 69 % (ref 36–65)
NEUTS SEG NFR BLD: 5.06 K/UL (ref 1.5–8.1)
NRBC BLD-RTO: 0 PER 100 WBC
PLATELET # BLD AUTO: 286 K/UL (ref 138–453)
PMV BLD AUTO: 9.5 FL (ref 8.1–13.5)
POTASSIUM SERPL-SCNC: 3.7 MMOL/L (ref 3.7–5.3)
RBC # BLD AUTO: 4.55 M/UL (ref 3.95–5.11)
SARS-COV-2 RDRP RESP QL NAA+PROBE: NOT DETECTED
SODIUM SERPL-SCNC: 133 MMOL/L (ref 136–145)
SPECIMEN DESCRIPTION: NORMAL
WBC OTHER # BLD: 7.3 K/UL (ref 3.5–11.3)

## 2025-03-12 PROCEDURE — 80048 BASIC METABOLIC PNL TOTAL CA: CPT

## 2025-03-12 PROCEDURE — 87804 INFLUENZA ASSAY W/OPTIC: CPT

## 2025-03-12 PROCEDURE — 99284 EMERGENCY DEPT VISIT MOD MDM: CPT

## 2025-03-12 PROCEDURE — 6370000000 HC RX 637 (ALT 250 FOR IP): Performed by: EMERGENCY MEDICINE

## 2025-03-12 PROCEDURE — 83880 ASSAY OF NATRIURETIC PEPTIDE: CPT

## 2025-03-12 PROCEDURE — 87635 SARS-COV-2 COVID-19 AMP PRB: CPT

## 2025-03-12 PROCEDURE — 85025 COMPLETE CBC W/AUTO DIFF WBC: CPT

## 2025-03-12 PROCEDURE — 71045 X-RAY EXAM CHEST 1 VIEW: CPT

## 2025-03-12 RX ORDER — FUROSEMIDE 20 MG/1
20 TABLET ORAL DAILY
Qty: 5 TABLET | Refills: 0 | Status: SHIPPED | OUTPATIENT
Start: 2025-03-12 | End: 2025-03-17

## 2025-03-12 RX ORDER — FUROSEMIDE 40 MG/1
20 TABLET ORAL DAILY
Status: DISCONTINUED | OUTPATIENT
Start: 2025-03-12 | End: 2025-03-12 | Stop reason: HOSPADM

## 2025-03-12 RX ADMIN — FUROSEMIDE 20 MG: 40 TABLET ORAL at 13:12

## 2025-03-12 ASSESSMENT — ENCOUNTER SYMPTOMS
BACK PAIN: 0
SORE THROAT: 0
ABDOMINAL DISTENTION: 0
SHORTNESS OF BREATH: 1
COUGH: 1

## 2025-03-12 ASSESSMENT — PAIN - FUNCTIONAL ASSESSMENT: PAIN_FUNCTIONAL_ASSESSMENT: NONE - DENIES PAIN

## 2025-03-12 NOTE — DISCHARGE INSTRUCTIONS
Please follow-up with your primary care doctor next week.  Take Lasix as prescribed for the next 5 days.  Once you have a follow-up they can determine if you need to continue with the Lasix or not.  Return to the emergency department if symptoms get worse.

## 2025-03-12 NOTE — ED PROVIDER NOTES
[facility] EMERGENCY DEPARTMENT  EMERGENCY DEPARTMENT ENCOUNTER      Pt Name: Radha Coburn  MRN: [id number]  Birthdate 11/4/1933  Date of evaluation: 3/12/2025  Provider: Nicole Cuba DO    CHIEF COMPLAINT       Chief Complaint   Patient presents with    Cough     Family reports patient up most the night coughing.         HISTORY OF PRESENT ILLNESS   (Location/Symptom, Timing/Onset, Context/Setting, Quality, Duration, Modifying Factors, Severity)  Note limiting factors.   Radha Coburn is a 89 y.o. female who presents to the emergency department with family with complaints of cough all night.  Patient is nonverbal at baseline so the history is provided by her daughter.  Daughter states that all night she was up coughing and this morning did cough up some yellow-colored phlegm.  She has been congested in her sinuses as well for the last couple days.  Patient has also had diarrhea for the past 5 days which has been running through the entire household apparently.  Daughter states that her appetite is fluctuating but she has been trying to keep fluids down.  She denies any fevers or chills.  Patient on arrival does not appear to be in any distress.    REVIEW OF SYSTEMS    (2-9 systems for level 4, 10 or more for level 5)     Review of Systems   Constitutional:  Negative for fatigue and fever.   HENT:  Positive for congestion. Negative for sore throat.    Eyes:  Negative for visual disturbance.   Respiratory:  Positive for cough and shortness of breath.    Cardiovascular:  Negative for chest pain and palpitations.   Gastrointestinal:  Negative for abdominal distention.   Genitourinary:  Negative for dysuria.   Musculoskeletal:  Negative for back pain.   Skin:  Negative for rash.   Psychiatric/Behavioral:  Negative for suicidal ideas.        Except as noted above the remainder of the review of systems was reviewed and negative.       PAST MEDICAL HISTORY     Past Medical History:   Diagnosis Date    Allergic

## 2025-03-19 PROBLEM — Z86.73 HISTORY OF STROKE: Status: ACTIVE | Noted: 2025-03-19

## 2025-03-31 ENCOUNTER — APPOINTMENT (OUTPATIENT)
Dept: CT IMAGING | Age: 89
DRG: 194 | End: 2025-03-31
Payer: MEDICARE

## 2025-03-31 ENCOUNTER — APPOINTMENT (OUTPATIENT)
Dept: GENERAL RADIOLOGY | Age: 89
DRG: 194 | End: 2025-03-31
Payer: MEDICARE

## 2025-03-31 ENCOUNTER — HOSPITAL ENCOUNTER (INPATIENT)
Age: 89
LOS: 2 days | Discharge: HOME | DRG: 194 | End: 2025-04-03
Attending: STUDENT IN AN ORGANIZED HEALTH CARE EDUCATION/TRAINING PROGRAM | Admitting: INTERNAL MEDICINE
Payer: MEDICARE

## 2025-03-31 DIAGNOSIS — R09.02 HYPOXIA: ICD-10-CM

## 2025-03-31 DIAGNOSIS — J18.9 PNEUMONIA DUE TO INFECTIOUS ORGANISM, UNSPECIFIED LATERALITY, UNSPECIFIED PART OF LUNG: Primary | ICD-10-CM

## 2025-03-31 DIAGNOSIS — J18.9 ACUTE PNEUMONIA: ICD-10-CM

## 2025-03-31 DIAGNOSIS — K59.00 CONSTIPATION, UNSPECIFIED CONSTIPATION TYPE: ICD-10-CM

## 2025-03-31 LAB
ALBUMIN SERPL-MCNC: 3.4 G/DL (ref 3.5–5.2)
ALBUMIN/GLOB SERPL: 1 {RATIO} (ref 1–2.5)
ALP SERPL-CCNC: 115 U/L (ref 35–104)
ALT SERPL-CCNC: 22 U/L (ref 10–35)
ANION GAP SERPL CALCULATED.3IONS-SCNC: 13 MMOL/L (ref 9–16)
AST SERPL-CCNC: 35 U/L (ref 10–35)
BASOPHILS # BLD: 0.03 K/UL (ref 0–0.2)
BASOPHILS NFR BLD: 0 % (ref 0–2)
BILIRUB SERPL-MCNC: 0.7 MG/DL (ref 0–1.2)
BNP SERPL-MCNC: 3879 PG/ML (ref 0–450)
BUN SERPL-MCNC: 21 MG/DL (ref 8–23)
BUN/CREAT SERPL: 35 (ref 9–20)
CALCIUM SERPL-MCNC: 9 MG/DL (ref 8.6–10.4)
CHLORIDE SERPL-SCNC: 91 MMOL/L (ref 98–107)
CO2 SERPL-SCNC: 24 MMOL/L (ref 20–31)
CREAT SERPL-MCNC: 0.6 MG/DL (ref 0.5–0.9)
EKG Q-T INTERVAL: 360 MS
EKG QRS DURATION: 84 MS
EKG QTC CALCULATION (BAZETT): 454 MS
EKG R AXIS: -39 DEGREES
EKG T AXIS: 54 DEGREES
EKG VENTRICULAR RATE: 96 BPM
EOSINOPHIL # BLD: 0.17 K/UL (ref 0–0.44)
EOSINOPHILS RELATIVE PERCENT: 1 % (ref 1–4)
ERYTHROCYTE [DISTWIDTH] IN BLOOD BY AUTOMATED COUNT: 14.9 % (ref 11.8–14.4)
GFR, ESTIMATED: 84 ML/MIN/1.73M2
GLUCOSE SERPL-MCNC: 169 MG/DL (ref 74–99)
HCT VFR BLD AUTO: 37.9 % (ref 36.3–47.1)
HGB BLD-MCNC: 12.4 G/DL (ref 11.9–15.1)
IMM GRANULOCYTES # BLD AUTO: 0.05 K/UL (ref 0–0.3)
IMM GRANULOCYTES NFR BLD: 0 %
LYMPHOCYTES NFR BLD: 1.48 K/UL (ref 1.1–3.7)
LYMPHOCYTES RELATIVE PERCENT: 11 % (ref 24–43)
MCH RBC QN AUTO: 30.5 PG (ref 25.2–33.5)
MCHC RBC AUTO-ENTMCNC: 32.7 G/DL (ref 28.4–34.8)
MCV RBC AUTO: 93.3 FL (ref 82.6–102.9)
MONOCYTES NFR BLD: 1.38 K/UL (ref 0.1–1.2)
MONOCYTES NFR BLD: 10 % (ref 3–12)
NEUTROPHILS NFR BLD: 78 % (ref 36–65)
NEUTS SEG NFR BLD: 10.34 K/UL (ref 1.5–8.1)
NRBC BLD-RTO: 0 PER 100 WBC
PLATELET # BLD AUTO: 316 K/UL (ref 138–453)
PMV BLD AUTO: 10 FL (ref 8.1–13.5)
POTASSIUM SERPL-SCNC: 4.1 MMOL/L (ref 3.7–5.3)
PROT SERPL-MCNC: 6.6 G/DL (ref 6.6–8.7)
RBC # BLD AUTO: 4.06 M/UL (ref 3.95–5.11)
SODIUM SERPL-SCNC: 128 MMOL/L (ref 136–145)
TROPONIN I SERPL HS-MCNC: 17 NG/L (ref 0–14)
WBC OTHER # BLD: 13.5 K/UL (ref 3.5–11.3)

## 2025-03-31 PROCEDURE — 85025 COMPLETE CBC W/AUTO DIFF WBC: CPT

## 2025-03-31 PROCEDURE — 83605 ASSAY OF LACTIC ACID: CPT

## 2025-03-31 PROCEDURE — 84484 ASSAY OF TROPONIN QUANT: CPT

## 2025-03-31 PROCEDURE — 6360000004 HC RX CONTRAST MEDICATION: Performed by: STUDENT IN AN ORGANIZED HEALTH CARE EDUCATION/TRAINING PROGRAM

## 2025-03-31 PROCEDURE — 99285 EMERGENCY DEPT VISIT HI MDM: CPT

## 2025-03-31 PROCEDURE — 83880 ASSAY OF NATRIURETIC PEPTIDE: CPT

## 2025-03-31 PROCEDURE — 93005 ELECTROCARDIOGRAM TRACING: CPT | Performed by: STUDENT IN AN ORGANIZED HEALTH CARE EDUCATION/TRAINING PROGRAM

## 2025-03-31 PROCEDURE — 74177 CT ABD & PELVIS W/CONTRAST: CPT

## 2025-03-31 PROCEDURE — 80053 COMPREHEN METABOLIC PANEL: CPT

## 2025-03-31 PROCEDURE — 93010 ELECTROCARDIOGRAM REPORT: CPT | Performed by: FAMILY MEDICINE

## 2025-03-31 PROCEDURE — 71046 X-RAY EXAM CHEST 2 VIEWS: CPT

## 2025-03-31 RX ORDER — IOPAMIDOL 755 MG/ML
75 INJECTION, SOLUTION INTRAVASCULAR
Status: COMPLETED | OUTPATIENT
Start: 2025-03-31 | End: 2025-03-31

## 2025-03-31 RX ADMIN — IOPAMIDOL 75 ML: 755 INJECTION, SOLUTION INTRAVENOUS at 23:12

## 2025-03-31 ASSESSMENT — ENCOUNTER SYMPTOMS
COUGH: 1
ABDOMINAL PAIN: 1
CONSTIPATION: 1

## 2025-04-01 PROBLEM — R09.02 HYPOXIA: Status: ACTIVE | Noted: 2025-04-01

## 2025-04-01 PROBLEM — J18.9 ACUTE PNEUMONIA: Status: ACTIVE | Noted: 2025-04-01

## 2025-04-01 PROBLEM — K59.00 CONSTIPATION: Status: ACTIVE | Noted: 2025-04-01

## 2025-04-01 LAB
ANION GAP SERPL CALCULATED.3IONS-SCNC: 8 MMOL/L (ref 9–16)
B PARAP IS1001 DNA NPH QL NAA+NON-PROBE: NOT DETECTED
B PERT DNA SPEC QL NAA+PROBE: NOT DETECTED
BACTERIA URNS QL MICRO: ABNORMAL
BASOPHILS # BLD: 0.14 K/UL (ref 0–0.2)
BASOPHILS NFR BLD: 1 % (ref 0–2)
BILIRUB UR QL STRIP: NEGATIVE
BUN SERPL-MCNC: 14 MG/DL (ref 8–23)
BUN/CREAT SERPL: 28 (ref 9–20)
C PNEUM DNA NPH QL NAA+NON-PROBE: NOT DETECTED
CALCIUM SERPL-MCNC: 8.6 MG/DL (ref 8.6–10.4)
CHLORIDE SERPL-SCNC: 94 MMOL/L (ref 98–107)
CLARITY UR: ABNORMAL
CO2 SERPL-SCNC: 27 MMOL/L (ref 20–31)
COLOR UR: YELLOW
CREAT SERPL-MCNC: 0.5 MG/DL (ref 0.5–0.9)
EOSINOPHIL # BLD: 0.14 K/UL (ref 0–0.44)
EOSINOPHILS RELATIVE PERCENT: 1 % (ref 1–4)
EPI CELLS #/AREA URNS HPF: ABNORMAL /HPF (ref 0–25)
ERYTHROCYTE [DISTWIDTH] IN BLOOD BY AUTOMATED COUNT: 14.9 % (ref 11.8–14.4)
FLUAV RNA NPH QL NAA+NON-PROBE: NOT DETECTED
FLUBV RNA NPH QL NAA+NON-PROBE: NOT DETECTED
GFR, ESTIMATED: 89 ML/MIN/1.73M2
GLUCOSE SERPL-MCNC: 132 MG/DL (ref 74–99)
GLUCOSE UR STRIP-MCNC: NEGATIVE MG/DL
HADV DNA NPH QL NAA+NON-PROBE: NOT DETECTED
HCOV 229E RNA NPH QL NAA+NON-PROBE: NOT DETECTED
HCOV HKU1 RNA NPH QL NAA+NON-PROBE: NOT DETECTED
HCOV NL63 RNA NPH QL NAA+NON-PROBE: NOT DETECTED
HCOV OC43 RNA NPH QL NAA+NON-PROBE: NOT DETECTED
HCT VFR BLD AUTO: 33.8 % (ref 36.3–47.1)
HGB BLD-MCNC: 11.1 G/DL (ref 11.9–15.1)
HGB UR QL STRIP.AUTO: NEGATIVE
HMPV RNA NPH QL NAA+NON-PROBE: NOT DETECTED
HPIV1 RNA NPH QL NAA+NON-PROBE: NOT DETECTED
HPIV2 RNA NPH QL NAA+NON-PROBE: NOT DETECTED
HPIV3 RNA NPH QL NAA+NON-PROBE: NOT DETECTED
HPIV4 RNA NPH QL NAA+NON-PROBE: NOT DETECTED
IMM GRANULOCYTES # BLD AUTO: 0 K/UL (ref 0–0.3)
IMM GRANULOCYTES NFR BLD: 0 %
KETONES UR STRIP-MCNC: ABNORMAL MG/DL
LACTATE BLDV-SCNC: 1.7 MMOL/L (ref 0.5–2.2)
LEUKOCYTE ESTERASE UR QL STRIP: ABNORMAL
LYMPHOCYTES NFR BLD: 1.79 K/UL (ref 1.1–3.7)
LYMPHOCYTES RELATIVE PERCENT: 13 % (ref 24–43)
M PNEUMO DNA NPH QL NAA+NON-PROBE: NOT DETECTED
MCH RBC QN AUTO: 30.6 PG (ref 25.2–33.5)
MCHC RBC AUTO-ENTMCNC: 32.8 G/DL (ref 28.4–34.8)
MCV RBC AUTO: 93.1 FL (ref 82.6–102.9)
MONOCYTES NFR BLD: 1.38 K/UL (ref 0.1–1.2)
MONOCYTES NFR BLD: 10 % (ref 3–12)
MORPHOLOGY: NORMAL
NEUTROPHILS NFR BLD: 75 % (ref 36–65)
NEUTS SEG NFR BLD: 10.35 K/UL (ref 1.5–8.1)
NITRITE UR QL STRIP: NEGATIVE
NRBC BLD-RTO: 0 PER 100 WBC
PH UR STRIP: 7.5 [PH] (ref 5–9)
PLATELET # BLD AUTO: 272 K/UL (ref 138–453)
PMV BLD AUTO: 9.4 FL (ref 8.1–13.5)
POTASSIUM SERPL-SCNC: 4 MMOL/L (ref 3.7–5.3)
PROT UR STRIP-MCNC: ABNORMAL MG/DL
RBC # BLD AUTO: 3.63 M/UL (ref 3.95–5.11)
RBC #/AREA URNS HPF: ABNORMAL /HPF (ref 0–2)
RSV RNA NPH QL NAA+NON-PROBE: NOT DETECTED
RV+EV RNA NPH QL NAA+NON-PROBE: NOT DETECTED
SARS-COV-2 RNA NPH QL NAA+NON-PROBE: NOT DETECTED
SODIUM SERPL-SCNC: 129 MMOL/L (ref 136–145)
SP GR UR STRIP: 1.01 (ref 1.01–1.02)
SPECIMEN DESCRIPTION: NORMAL
TROPONIN I SERPL HS-MCNC: 17 NG/L (ref 0–14)
UROBILINOGEN UR STRIP-ACNC: ABNORMAL EU/DL (ref 0–1)
WBC #/AREA URNS HPF: ABNORMAL /HPF (ref 0–5)
WBC OTHER # BLD: 13.8 K/UL (ref 3.5–11.3)

## 2025-04-01 PROCEDURE — 2500000003 HC RX 250 WO HCPCS

## 2025-04-01 PROCEDURE — 97162 PT EVAL MOD COMPLEX 30 MIN: CPT

## 2025-04-01 PROCEDURE — 36415 COLL VENOUS BLD VENIPUNCTURE: CPT

## 2025-04-01 PROCEDURE — 84145 PROCALCITONIN (PCT): CPT

## 2025-04-01 PROCEDURE — 81001 URINALYSIS AUTO W/SCOPE: CPT

## 2025-04-01 PROCEDURE — 94640 AIRWAY INHALATION TREATMENT: CPT

## 2025-04-01 PROCEDURE — 2500000003 HC RX 250 WO HCPCS: Performed by: STUDENT IN AN ORGANIZED HEALTH CARE EDUCATION/TRAINING PROGRAM

## 2025-04-01 PROCEDURE — 6370000000 HC RX 637 (ALT 250 FOR IP)

## 2025-04-01 PROCEDURE — 80048 BASIC METABOLIC PNL TOTAL CA: CPT

## 2025-04-01 PROCEDURE — 85025 COMPLETE CBC W/AUTO DIFF WBC: CPT

## 2025-04-01 PROCEDURE — 6370000000 HC RX 637 (ALT 250 FOR IP): Performed by: INTERNAL MEDICINE

## 2025-04-01 PROCEDURE — 84484 ASSAY OF TROPONIN QUANT: CPT

## 2025-04-01 PROCEDURE — 94669 MECHANICAL CHEST WALL OSCILL: CPT

## 2025-04-01 PROCEDURE — 6360000002 HC RX W HCPCS: Performed by: STUDENT IN AN ORGANIZED HEALTH CARE EDUCATION/TRAINING PROGRAM

## 2025-04-01 PROCEDURE — 2580000003 HC RX 258: Performed by: STUDENT IN AN ORGANIZED HEALTH CARE EDUCATION/TRAINING PROGRAM

## 2025-04-01 PROCEDURE — 92610 EVALUATE SWALLOWING FUNCTION: CPT

## 2025-04-01 PROCEDURE — 6360000002 HC RX W HCPCS

## 2025-04-01 PROCEDURE — 2580000003 HC RX 258

## 2025-04-01 PROCEDURE — 94664 DEMO&/EVAL PT USE INHALER: CPT

## 2025-04-01 PROCEDURE — 1200000000 HC SEMI PRIVATE

## 2025-04-01 PROCEDURE — 0202U NFCT DS 22 TRGT SARS-COV-2: CPT

## 2025-04-01 PROCEDURE — 97166 OT EVAL MOD COMPLEX 45 MIN: CPT

## 2025-04-01 PROCEDURE — 87040 BLOOD CULTURE FOR BACTERIA: CPT

## 2025-04-01 PROCEDURE — 2700000000 HC OXYGEN THERAPY PER DAY

## 2025-04-01 PROCEDURE — 94761 N-INVAS EAR/PLS OXIMETRY MLT: CPT

## 2025-04-01 RX ORDER — SODIUM CHLORIDE 0.9 % (FLUSH) 0.9 %
5-40 SYRINGE (ML) INJECTION PRN
Status: DISCONTINUED | OUTPATIENT
Start: 2025-04-01 | End: 2025-04-03 | Stop reason: HOSPADM

## 2025-04-01 RX ORDER — CLONIDINE 0.3 MG/24H
1 PATCH, EXTENDED RELEASE TRANSDERMAL
Status: DISCONTINUED | OUTPATIENT
Start: 2025-04-06 | End: 2025-04-03 | Stop reason: HOSPADM

## 2025-04-01 RX ORDER — ALPRAZOLAM 0.25 MG
0.25 TABLET ORAL DAILY PRN
Status: DISCONTINUED | OUTPATIENT
Start: 2025-04-01 | End: 2025-04-01

## 2025-04-01 RX ORDER — ALBUTEROL SULFATE 90 UG/1
2 INHALANT RESPIRATORY (INHALATION) DAILY PRN
Status: DISCONTINUED | OUTPATIENT
Start: 2025-04-01 | End: 2025-04-03 | Stop reason: HOSPADM

## 2025-04-01 RX ORDER — ENOXAPARIN SODIUM 100 MG/ML
40 INJECTION SUBCUTANEOUS DAILY
Status: DISCONTINUED | OUTPATIENT
Start: 2025-04-01 | End: 2025-04-03 | Stop reason: HOSPADM

## 2025-04-01 RX ORDER — SODIUM CHLORIDE 9 MG/ML
INJECTION, SOLUTION INTRAVENOUS CONTINUOUS
Status: DISCONTINUED | OUTPATIENT
Start: 2025-04-01 | End: 2025-04-01

## 2025-04-01 RX ORDER — ASPIRIN 81 MG/1
81 TABLET ORAL NIGHTLY
Status: DISCONTINUED | OUTPATIENT
Start: 2025-04-02 | End: 2025-04-03 | Stop reason: HOSPADM

## 2025-04-01 RX ORDER — IPRATROPIUM BROMIDE AND ALBUTEROL SULFATE 2.5; .5 MG/3ML; MG/3ML
1 SOLUTION RESPIRATORY (INHALATION) EVERY 4 HOURS PRN
Status: DISCONTINUED | OUTPATIENT
Start: 2025-04-01 | End: 2025-04-01

## 2025-04-01 RX ORDER — SODIUM CHLORIDE 0.9 % (FLUSH) 0.9 %
5-40 SYRINGE (ML) INJECTION EVERY 12 HOURS SCHEDULED
Status: DISCONTINUED | OUTPATIENT
Start: 2025-04-01 | End: 2025-04-03 | Stop reason: HOSPADM

## 2025-04-01 RX ORDER — IPRATROPIUM BROMIDE AND ALBUTEROL SULFATE 2.5; .5 MG/3ML; MG/3ML
1 SOLUTION RESPIRATORY (INHALATION)
Status: DISCONTINUED | OUTPATIENT
Start: 2025-04-01 | End: 2025-04-03 | Stop reason: HOSPADM

## 2025-04-01 RX ORDER — FUROSEMIDE 40 MG/1
40 TABLET ORAL DAILY
Status: DISCONTINUED | OUTPATIENT
Start: 2025-04-02 | End: 2025-04-03 | Stop reason: HOSPADM

## 2025-04-01 RX ORDER — ALPRAZOLAM 0.25 MG
0.25 TABLET ORAL DAILY PRN
COMMUNITY

## 2025-04-01 RX ORDER — ROSUVASTATIN CALCIUM 40 MG/1
40 TABLET, COATED ORAL NIGHTLY
Status: DISCONTINUED | OUTPATIENT
Start: 2025-04-01 | End: 2025-04-03 | Stop reason: HOSPADM

## 2025-04-01 RX ORDER — ONDANSETRON 4 MG/1
4 TABLET, ORALLY DISINTEGRATING ORAL EVERY 8 HOURS PRN
Status: DISCONTINUED | OUTPATIENT
Start: 2025-04-01 | End: 2025-04-03 | Stop reason: HOSPADM

## 2025-04-01 RX ORDER — ONDANSETRON 2 MG/ML
4 INJECTION INTRAMUSCULAR; INTRAVENOUS EVERY 6 HOURS PRN
Status: DISCONTINUED | OUTPATIENT
Start: 2025-04-01 | End: 2025-04-03 | Stop reason: HOSPADM

## 2025-04-01 RX ORDER — POLYETHYLENE GLYCOL 3350 17 G/17G
17 POWDER, FOR SOLUTION ORAL DAILY PRN
Status: DISCONTINUED | OUTPATIENT
Start: 2025-04-01 | End: 2025-04-03 | Stop reason: HOSPADM

## 2025-04-01 RX ORDER — SODIUM CHLORIDE 9 MG/ML
INJECTION, SOLUTION INTRAVENOUS PRN
Status: DISCONTINUED | OUTPATIENT
Start: 2025-04-01 | End: 2025-04-03 | Stop reason: HOSPADM

## 2025-04-01 RX ORDER — ALPRAZOLAM 0.25 MG
0.25 TABLET ORAL 3 TIMES DAILY PRN
Status: DISCONTINUED | OUTPATIENT
Start: 2025-04-01 | End: 2025-04-03 | Stop reason: HOSPADM

## 2025-04-01 RX ORDER — 0.9 % SODIUM CHLORIDE 0.9 %
250 INTRAVENOUS SOLUTION INTRAVENOUS ONCE
Status: COMPLETED | OUTPATIENT
Start: 2025-04-01 | End: 2025-04-01

## 2025-04-01 RX ORDER — MIRTAZAPINE 15 MG/1
7.5 TABLET, FILM COATED ORAL NIGHTLY
Status: DISCONTINUED | OUTPATIENT
Start: 2025-04-01 | End: 2025-04-03 | Stop reason: HOSPADM

## 2025-04-01 RX ORDER — POLYETHYLENE GLYCOL 3350 17 G/17G
17 POWDER, FOR SOLUTION ORAL DAILY
Status: DISCONTINUED | OUTPATIENT
Start: 2025-04-01 | End: 2025-04-03 | Stop reason: HOSPADM

## 2025-04-01 RX ORDER — ALBUTEROL SULFATE 0.83 MG/ML
2.5 SOLUTION RESPIRATORY (INHALATION) EVERY 4 HOURS PRN
Status: DISCONTINUED | OUTPATIENT
Start: 2025-04-01 | End: 2025-04-03 | Stop reason: HOSPADM

## 2025-04-01 RX ADMIN — ALPRAZOLAM 0.25 MG: 0.25 TABLET ORAL at 05:44

## 2025-04-01 RX ADMIN — SODIUM CHLORIDE, PRESERVATIVE FREE 10 ML: 5 INJECTION INTRAVENOUS at 10:13

## 2025-04-01 RX ADMIN — ENOXAPARIN SODIUM 40 MG: 100 INJECTION SUBCUTANEOUS at 10:13

## 2025-04-01 RX ADMIN — POLYETHYLENE GLYCOL 3350 17 G: 17 POWDER, FOR SOLUTION ORAL at 10:13

## 2025-04-01 RX ADMIN — IPRATROPIUM BROMIDE AND ALBUTEROL SULFATE 1 DOSE: .5; 2.5 SOLUTION RESPIRATORY (INHALATION) at 20:32

## 2025-04-01 RX ADMIN — SODIUM CHLORIDE, PRESERVATIVE FREE 10 ML: 5 INJECTION INTRAVENOUS at 20:14

## 2025-04-01 RX ADMIN — MIRTAZAPINE 7.5 MG: 15 TABLET, FILM COATED ORAL at 02:53

## 2025-04-01 RX ADMIN — IPRATROPIUM BROMIDE AND ALBUTEROL SULFATE 1 DOSE: .5; 2.5 SOLUTION RESPIRATORY (INHALATION) at 09:40

## 2025-04-01 RX ADMIN — AZITHROMYCIN MONOHYDRATE 500 MG: 500 INJECTION, POWDER, LYOPHILIZED, FOR SOLUTION INTRAVENOUS at 01:13

## 2025-04-01 RX ADMIN — ALPRAZOLAM 0.25 MG: 0.25 TABLET ORAL at 02:43

## 2025-04-01 RX ADMIN — IPRATROPIUM BROMIDE AND ALBUTEROL SULFATE 1 DOSE: .5; 2.5 SOLUTION RESPIRATORY (INHALATION) at 15:43

## 2025-04-01 RX ADMIN — ROSUVASTATIN CALCIUM 40 MG: 40 TABLET, FILM COATED ORAL at 02:52

## 2025-04-01 RX ADMIN — ALPRAZOLAM 0.25 MG: 0.25 TABLET ORAL at 18:42

## 2025-04-01 RX ADMIN — IPRATROPIUM BROMIDE AND ALBUTEROL SULFATE 1 DOSE: .5; 2.5 SOLUTION RESPIRATORY (INHALATION) at 05:23

## 2025-04-01 RX ADMIN — WATER 1000 MG: 1 INJECTION INTRAMUSCULAR; INTRAVENOUS; SUBCUTANEOUS at 01:03

## 2025-04-01 RX ADMIN — MIRTAZAPINE 7.5 MG: 15 TABLET, FILM COATED ORAL at 20:11

## 2025-04-01 RX ADMIN — SODIUM CHLORIDE: 0.9 INJECTION, SOLUTION INTRAVENOUS at 02:34

## 2025-04-01 RX ADMIN — SODIUM CHLORIDE 250 ML: 0.9 INJECTION, SOLUTION INTRAVENOUS at 01:16

## 2025-04-01 RX ADMIN — ROSUVASTATIN CALCIUM 40 MG: 40 TABLET, FILM COATED ORAL at 20:11

## 2025-04-01 NOTE — PROGRESS NOTES
[facility]  Inpatient/Observation/Outpatient Rehabilitation    Date: 2025  Patient Name: Radha Coburn       [x] Inpatient Acute/Observation       []  Outpatient  : 1933         [x] Pt refused/declined therapy at this time due to:      Spoke with patients daughter, stated she just finished a breathing treatment and fell asleep. Asked to allow pt. to rest at this time.      [] Pt cancelled due to:  [] No Reason Given   [] Sick/ill   [] Other:      [] Evaluation held by RN/Provider/Physical Therapist due to:    [] High Heart Rate   [] High Blood Pressure   [] Orthopedic Consult   [] Hgb < 7   [] Other:    [] Pt ordered brace per physician request:  [] Proper fit will be completed and education for wearing/skin checks    [] Pt does not require skilled services due to:      Therapist/Assistant will attempt to see this patient, at our earliest opportunity.       Yarelis Eddy, PTA Date: 2025

## 2025-04-01 NOTE — PROGRESS NOTES
Progress Note    SUBJECTIVE:    Patient seen for f/u of Acute pneumonia.  She sitting up in chair receiving a neb treatment.  She does feel they help her.  No other concerns     ROS:   Constitutional: negative  for fevers, and negative for chills.  Respiratory: positive for shortness of breath, positive for cough, and negative for wheezing  Cardiovascular: negative for chest pain, and negative for palpitations  Gastrointestinal: negative for abdominal pain, negative for nausea,negative for vomiting, negative for diarrhea, and negative for constipation     All other systems were reviewed with the patient and are negative unless otherwise stated in HPI      OBJECTIVE:      Vitals:   Vitals:    04/01/25 1230   BP: 111/65   Pulse: 88   Resp: 16   Temp: 97.8 °F (36.6 °C)   SpO2: 93%     Weight - Scale: 78.5 kg (173 lb 1.6 oz)   Height: 170.2 cm (5' 7\")     Weight  Wt Readings from Last 3 Encounters:   04/01/25 78.5 kg (173 lb 1.6 oz)   03/19/25 72.3 kg (159 lb 8 oz)   03/01/25 78.9 kg (174 lb)     Body mass index is 27.11 kg/m².    24HR INTAKE/OUTPUT:      Intake/Output Summary (Last 24 hours) at 4/1/2025 1318  Last data filed at 4/1/2025 1239  Gross per 24 hour   Intake 793 ml   Output 850 ml   Net -57 ml     -----------------------------------------------------------------  Exam:    GEN:    Awake, alert but aphasic .   EYES:  EOMI, pupils equal   NECK: Supple. No lymphadenopathy.  No carotid bruit  CVS:    regular rate and rhythm, no audible murmur  PULM:   coarse rhonchi with basilar rales , no acute respiratory distress  ABD:    Bowels sounds normal.  Abdomen is soft.  No distention.  no tenderness to palpation.   EXT:   no edema bilaterally .  No calf tenderness.   NEURO: Moves all extremities.  Motor and sensory are grossly intact  SKIN:  No rashes.  No skin lesions.    -----------------------------------------------------------------    Diagnostic Data:      Complete Blood Count:   Recent Labs     03/31/25  2708  (04/01/25 0618)    I agree with the dietitian's malnutrition assessment.    Medical Nutrition Therapy: oral supplements    Electronically signed by KARLIE Zaidi CNP on 4/1/25 at 9:45 AM [facility] Prophylaxis:   DVT: Lovenox   Stress Ulcer:  na      Disposition:  Shared decision making: All test results, treatment options and disposition options were discussed with the patient today  Social determinants of health that may impact management: none  Code status: DNR-CC   Disposition: Discharge plan is pending    [facility] Advanced Care Planning documentation:  [x] I have confirmed that the patient's Advance Care Plan is present, Code Status is documented, or surrogate decision maker is listed in the patient's medical record  [If \"yes\", STOP HERE]     [] The patient's Advance Care Plan is NOT present because:    []  I confirmed today that the patient does not wish or was not able to name a   surrogate decision maker or provide and advance care plan.    [] Hospice care is currently being provided or has been provided within the   calendar year.    []  I did NOT confirm today the presence of an Advance Care Plan or surrogate   decision maker documented within the patient's medical record.   [DOES NOT SATISFY [facility] PERFORMANCE]    KARLIE Zaidi CNP , KARLIE, NP-C  Hospitalist Medicine        4/1/2025, 1:18 PM

## 2025-04-01 NOTE — PROGRESS NOTES
Message left for daughter to return phone call.     Evelin Crowley RN  [facility]   Palliative Care Nurse Coordinator  4/1/2025 3:09 PM

## 2025-04-01 NOTE — PROGRESS NOTES
Nutrition Education    Educated on minced and moist diet  Learners:  Pt asleep, information left at bedside  Readiness: Acceptance  Method: Handout  Response: Needs Reinforcement  Contact name and number provided.    PAM MILLER RD, LD  Contact Number: [phone]

## 2025-04-01 NOTE — PROGRESS NOTES
Spoke with daughter, Cindy, via telephone conversation re:  Patient discharge planning.  Patient is a 89 year old , white female, admitted with a diagnosis of Pneumonia.  Daughter reports that the plan is for Patient to return home with her and resume [facility] Care by [facility].      Patient resides with her daughter Cindy since her recent CVA.  Uses a wheeled walker, grab bars, shower chair, elevated toilet seat and has bed rails on her bed for assistance at home.  Has had [facility] Home by [facility] and wishes to resume these services at discharge.  Daughter provides for Patient transportation needs at this point.    PCP is Dr. Amandeep Ramos.  Patient has Medicare and  insurance and daughter reports no difficulty with regards to affording her prescription medications.     Discharge plan is for Patient to return home with her daughter with [facility] to follow.  Patient has a 'DNRCC' order in place.  Medical Directives also in place.  LSW following.    TONA Zhao  4/1/2025

## 2025-04-01 NOTE — RT PROTOCOL NOTE
RESPIRATORY ASSESSMENT PROTOCOL                                                                                              Patient Name: Radha Coburn Room#: 0329/0329-01 : 1933     Admitting diagnosis: Constipation, unspecified constipation type [K59.00]  Pneumonia due to infectious organism, unspecified laterality, unspecified part of lung [J18.9]  Acute pneumonia [J18.9]       Medical History:   Past Medical History:   Diagnosis Date    Allergic rhinitis     Anxiety     Arthritis     DVT (deep venous thrombosis) ([facility]) 1970    rt leg       Essential hypertension     Hyperlipidemia     Hypertensive urgency 2023    Impaired fasting glucose 2010    Osteoarthritis of right hip / Total Right Arthroplasty 2014    Osteoarthritis of right knee / Right TKA 2014    PONV (postoperative nausea and vomiting)     Shingles 2009    left arm    Stroke ([facility])     Cerebral atherosclerosis MRI confirmed       PATIENT ASSESSMENT    LABORATORY DATA  Hematology:   Lab Results   Component Value Date/Time    WBC 13.5 2025 09:50 PM    RBC 4.06 2025 09:50 PM    HGB 12.4 2025 09:50 PM    HCT 37.9 2025 09:50 PM     2025 09:50 PM     Chemistry:  No results found for: \"PHART\", \"RLB8KYY\", \"PO2ART\", \"F7RDYZBR\", \"SOH5RUR\", \"PBEA\", \"NBEA\"    VITALS  Pulse: 90   Respirations: 18  BP: (!) 141/74  SpO2: 91 % O2 Device: Nasal cannula  Temp: 99 °F (37.2 °C)    SKIN COLOR  [x] Normal  [] Pale  [] Dusky  [] Cyanotic    RESPIRATORY PATTERN  [x] Normal  [] Dyspnea  [] Cheyne-Singer  [] Kussmaul  [] Biots    AMBULATORY  [] Yes  [] No  [x] With Assistance          Patient Acuity 0 1 2 3 4 Score   Level of Consciousness (LOC) [x]  Alert & Oriented or Pt normal LOC []  Confused;follows directions []  Confused & uncooper-ative []  Obtunded []  Comatose 0   Respiratory Rate  (RR) []  Reg. rate & pattern. 12 - 20 bpm  [x]  Increased RR. Greater than 20 bpm   []  SOB w/  exertion or RR greater than 24 bpm []  Access- ory muscle use at rest. Abn.  resp. []  SOB at rest.   1   Bilateral Breath Sounds (BBS) []  Clear []  Diminish-ed bases  []  Diminish-ed t/o, or rales   [x]  Sporadic, scattered wheezes or rhonchi []  Persistentwheezes and, or absent BBS 3   Cough [x]  Strong, effective, & non-prod. []  Effective & prod. Less than 25 ml (2 TBSP) over past 24 hrs []  Ineffective & non-prod to less than 25 ML over past 24 hrs []  Ineffective and, or greater than 25 ml sputum prod. past 24 hrs. []  Nonspon- taneous; Requires suctioning 0   Pulmonary History  (PULM HX) []  No smoking and no chronic pulmonary history []  Former smoker. Quit over 12 mos. ago []  Current smoker or quit w/ in 12 mos []  Pulm. History and, or 20 pk/yr smoking hx [x]  Admitted w/ acute pulm. dx and, or has been admitted w/ pulm. dx 2 or more times over past 12 mos 4   Surgical History this Admit  (SURG HX) [x]  No surgery []  General surgery []  Lower abdominal []  Thoracic or upper abdominal   []  Thoracic w/ pulm. disease 0   Chest X-Ray (CXR)/CT Scan []  Clear or not applicable []  Not available []  Atelectasis or pleural effusions [x]  Localized infiltrate or pulm. edema []  Con-solidated Infiltrates, bilateral, or in more than 1 lobe 3   TOTAL ACUITY: 11       CARE PLAN    If Acuity Level is 2, 3, or 4 in any of the following:    [x] BILATERAL BREATH SOUNDS (BBS)     [x] PULMONARY HISTORY (PULM HX)  [] Respiratory Rate  (RR)    Goal: Improve respiratory functions in patients with airway disease and decrease WOB    [x] AEROSOL PROTOCOL    Total Acuity:   14-28  []  Secondary Assessment in 24 hrs Total Acuity:  9-13  [x]  Secondary Assessment in 24 hrs Total Acuity:  4-8  []  Secondary Assessment in 24 hrs Total Acuity:  0-3  []  Secondary Assessment in 48 hrs   HHN AEROSOL THERAPY with  [physician-ordered bronchodilator(s)] q 4 & Albuterol PRN q2 hrs.   Breath-Actuated Neb if BBS Acuity = 4, and pt. can use

## 2025-04-01 NOTE — PROGRESS NOTES
Patient shift assessment and vitals completed at this time as charted. Patient is alert and oriented x4 and currently denies pain. Writer assisted patient with ambulating from the chair to the bed with a walker and patient tolerated well. Patient requesting xanax using her white board at this time, see MAR for Administration. No further needs requested by patient. Bed alarm on, plan of care is ongoing.

## 2025-04-01 NOTE — THERAPY EVALUATION
[facility]  SPEECH THERAPY  [facility] MED SURG  Bedside Swallow Examination    DIET ORDER RECOMMENDATIONS AFTER EVALUATION: Minced and Moist Solids, Thin Liquids     Date: 2025  Patient Name: Radha Coburn      CSN: [id number]   : 1933  (89 y.o.)  Gender: female   Referring Physician:  Amandeep Ramos MD     Diagnosis: Acute pneumonia  Speech Diagnosis:     History of Present Illness/Injury: Patient preseted to ER due to constipation with distended abdomen and cough. Patient diagnosed with pneumonia due to infectious organism and constipation.   Past Medical History:   Diagnosis Date    Allergic rhinitis     Anxiety     Arthritis     DVT (deep venous thrombosis) ([facility]) 1970    rt leg       Essential hypertension     Hyperlipidemia     Hypertensive urgency 2023    Impaired fasting glucose 2010    Osteoarthritis of right hip / Total Right Arthroplasty 2014    Osteoarthritis of right knee / Right TKA 2014    PONV (postoperative nausea and vomiting)     Shingles 2009    left arm    Stroke ([facility])     Cerebral atherosclerosis MRI confirmed       SUBJECTIVE:  Patient seen sitting upright in bed for bedside swallow evaluation. Patient is known to writer and is currenty receiving home health services for dysphagia and aphasia following CVA. Patient is minimally verbal and is able to communicate in short words, gestures, and use of white board. Patient demonstrating dry cough prior to PO intake.    OBJECTIVE:    Pain:  8/10 - Pain location: Abdomen, RN aware     Current Diet: Minced and Moist, Thin      Respiratory Status:  Nasal Canula: 2L    Behavioral Observation:  Alert and Lethargic    CRANIAL NERVE ASSESSMENT   CN V (Trigeminal) Closes and Opens Mandible WFL    Rotary Jaw Movement Impaired      CN VII (Facial) Cheeks Hold Food out of Sulci WFL    Opens, Closes/Seals, Protrudes, Retracts Lips WFL    General Appearance WFL    Sensation Not Tested      CN X  benefit from daily dysphagia tx during inpatient stay.     Cris Powell M.S. CCC-SLP

## 2025-04-01 NOTE — PROGRESS NOTES
Milk of molasses enema given at this time, pt had a small BM with it, states her belly feels better.

## 2025-04-01 NOTE — CONSULTS
Comprehensive Nutrition Assessment    Type and Reason for Visit:  Initial, Positive nutrition screen (MST 2)    Nutrition Recommendations/Plan:   Continue current diet.   Continue current ONS.      Malnutrition Assessment:  Malnutrition Status:  At risk for malnutrition (04/01/25 0618)    Context:  Acute Illness     Findings of the 6 clinical characteristics of malnutrition:  Energy Intake:  Unable to assess  Weight Loss:  Mild weight loss (10# x 5 months)     Body Fat Loss:  No body fat loss     Muscle Mass Loss:  No muscle mass loss    Fluid Accumulation:  Mild Extremities (trace BLE edema)   Strength:  Not Performed    Nutrition Assessment:    Inadequate oral intakes r/t altered GI function/swallowing difficulty aeb constipation, difficulty finding foods Pt can tolerate. A1c 6.5, good glycemic  control with advanced age. BNP 3,879. Pt noted to be non verbal. Pt did not awaken to knock or name. c/s for diet education, Pt family needed ideas to feed Pt at home due to swallowing difficulty, Dysphagia minced and moist nutrition therapy left at bedside.    Nutrition Related Findings:    active bowel sounds, no edema. + BM 4/1/25 Wound Type: None       Current Nutrition Intake & Therapies:    Average Meal Intake: Unable to assess (no meal intake data)  Average Supplements Intake: Unable to assess (no supplement intake data)  ADULT DIET; Dysphagia - Minced and Moist  ADULT ORAL NUTRITION SUPPLEMENT; Breakfast, Lunch, Dinner; Standard High Calorie/High Protein Oral Supplement    Anthropometric Measures:  Height: 170.2 cm (5' 7\")  Ideal Body Weight (IBW): 135 lbs (61 kg)    Admission Body Weight: 78.5 kg (173 lb 2 oz)  Current Body Weight: 78.5 kg (173 lb 1 oz), 128.2 % IBW. Weight Source: Bed scale  Current BMI (kg/m2): 27.1  Usual Body Weight: 84.5 kg (186 lb 5 oz)     % Weight Change (Calculated): -7.1  Weight Adjustment For: No Adjustment                 BMI Categories: Overweight (BMI 25.0-29.9)    Estimated

## 2025-04-01 NOTE — PROGRESS NOTES
Patient is nonverbal due to a past history of a past history of a stroke. Patient uses white board, nods and gestures to communicate her needs. Navigator was completed with the help of Patient and Daughter. Writer and GREY Byers educated the patient on using the vision board. Patients family at bedside. Home medications reviewed with patients harvey

## 2025-04-01 NOTE — H&P
History and Physical    Patient:  Radha Coburn  MRN: [id number]    Chief Complaint:  Constipation (Since friday)        History Obtained From:  patient, family member -daughter, electronic medical record    PCP: Amandeep Ramos MD    History of Present Illness:   The patient is a 89 y.o. female who presents with constipation.  Patient's last bowel movement was about 3 days ago.  Patient has history of a stroke and has difficulty swallowing.  She has only been eating cheese grits and pudding recently.  She has been drinking fluids.  They have been giving her stool softeners, MiraLAX and enema without any relief of her symptoms.  Her daughter was concerned today that she appears more distended.  She is also had a cough for the last 2 to 3 weeks.  She is seen in the emergency department earlier this month for this and was restarted on Lasix.  She was hypoxic while in the ER today with an SpO2 89%.  She was started on oxygen at 2 L.  History of diabetes.  She has also been having issues with her blood pressure dropping.  Her medications have been adjusted by her PCP recently and this has improved.  Patient is nonverbal.  Other past medical history includes atrial fibrillation, CHF, hypertension, and type 2 diabetes.  CT of the abdomen pelvis today showed no acute intra-abdominal or pelvic processes however did show moderate to large volume stool in the colon.  Chest x-ray showed retrocardiac patchy opacity.  WBC 13.5.  Hyponatremic at 128-near patient's baseline.  Chloride 91.  Troponin 17 and 17.  proBNP 3817.    Past Medical History:        Diagnosis Date    Allergic rhinitis     Anxiety     Arthritis     DVT (deep venous thrombosis) ([facility]) 01/01/1970    rt leg       Essential hypertension     Hyperlipidemia     Hypertensive urgency 06/28/2023    Impaired fasting glucose 01/01/2010    Osteoarthritis of right hip / Total Right Arthroplasty 2014 02/17/2014    Osteoarthritis of right knee / Right TKA 2014 08/01/2014

## 2025-04-01 NOTE — PROGRESS NOTES
Vitals and assessment completed at this time as charted. Pt was up to chair for breakfast now back in bed resting with eyes closed. Awakens to voice. Pt did not eat much breakfast. Pt is non verbal due to previous stroke. She does follow commands. When asked if she was having pain pt pointed to her abdomen and groaned. Abdomen is distended, active bowel sounds, soft. She did have a bowel movement at shift change this morning. Informed patient she will get an enema this morning. Per Dr Ramos he still wants patient to have enema. No other concerns at this time. Fall precautions in place. Call light within reach.

## 2025-04-01 NOTE — PROGRESS NOTES
Occupational Therapy  Facility/Department: [facility] MED SURG  Occupational Therapy Initial Assessment    Name: Radha Coburn  : 1933  MRN: [id number]  Date of Service: 2025    Discharge Recommendations:  Continue to assess pending progress          Patient Diagnosis(es): The primary encounter diagnosis was Pneumonia due to infectious organism, unspecified laterality, unspecified part of lung. A diagnosis of Constipation, unspecified constipation type was also pertinent to this visit.  Past Medical History:  has a past medical history of Allergic rhinitis, Anxiety, Arthritis, DVT (deep venous thrombosis) ([facility]), Essential hypertension, Hyperlipidemia, Hypertensive urgency, Impaired fasting glucose, Osteoarthritis of right hip / Total Right Arthroplasty , Osteoarthritis of right knee / Right TKA , PONV (postoperative nausea and vomiting), Shingles, and Stroke ([facility]).  Past Surgical History:  has a past surgical history that includes Appendectomy; Total hip arthroplasty (Right, 2014); Knee Arthroplasty (Right, ); Breast surgery (Left); eye surgery; joint replacement (Left, 2016); Knee arthroscopy (Left, 2016); and Injection Procedure For Sacroiliac Joint (Right, 10/30/2019).    Treatment Diagnosis: Weakness      Assessment  Performance deficits / Impairments: Decreased functional mobility ;Decreased ADL status;Decreased endurance;Decreased coordination;Decreased strength;Decreased balance  Assessment: 89 y/o F admitted to T for constipation. Patient with hx CVA with aphasia. Patient presents with generalized weakness and deconditioning. Patietn currently requires increased need for assist during ADL. patient would benfit from OT services to address to ensure safe return home with daughter.  Treatment Diagnosis: Weakness  Prognosis: Good;Fair  Decision Making: Medium Complexity  REQUIRES OT FOLLOW-UP: Yes     Plan  Occupational Therapy Plan  Times Per Day: Once a day  Days Per  Therapy;Plan of Care  Education Method: Verbal  Barriers to Learning: None  Education Outcome: Verbalized understanding                     AM-PAC - ADL  AM-PAC Daily Activity - Inpatient   How much help is needed for putting on and taking off regular lower body clothing?: A Lot  How much help is needed for bathing (which includes washing, rinsing, drying)?: A Lot  How much help is needed for toileting (which includes using toilet, bedpan, or urinal)?: A Lot  How much help is needed for putting on and taking off regular upper body clothing?: A Little  How much help is needed for taking care of personal grooming?: A Little  How much help for eating meals?: None  AM-PAC Inpatient Daily Activity Raw Score: 16  AM-PAC Inpatient ADL T-Scale Score : 35.96  ADL Inpatient CMS 0-100% Score: 53.32  ADL Inpatient CMS G-Code Modifier : CK    Tinneti Score       Goals  Short Term Goals  Time Frame for Short Term Goals: 21 visits  Short Term Goal 1: Patient to be educated on d/c folder, AE/DME and home safety to ensure safe return home.  Short Term Goal 2: Patient to engage in 15 minutes of ther ex/ther act to improve strength and activity tolerance for I/ADL upon return home.  Short Term Goal 3: Patient to complete ADL routine c SBA to ensure safe and indep return home.      Therapy Time   Individual Concurrent Group Co-treatment   Time In 1057         Time Out 1108         Minutes 11                 Allie Nugent OTR/L

## 2025-04-01 NOTE — PLAN OF CARE
Problem: Chronic Conditions and Co-morbidities  Goal: Patient's chronic conditions and co-morbidity symptoms are monitored and maintained or improved  Outcome: Progressing     Problem: Discharge Planning  Goal: Discharge to home or other facility with appropriate resources  Outcome: Progressing     Problem: Safety - Adult  Goal: Free from fall injury  Outcome: Progressing     Problem: ABCDS Injury Assessment  Goal: Absence of physical injury  Outcome: Progressing     Problem: Pain  Goal: Verbalizes/displays adequate comfort level or baseline comfort level  Outcome: Progressing       Problem: Nutrition Deficit:  Goal: Optimize nutritional status  4/1/2025 1943 by Gisella Meza, RN  Outcome: Progressing

## 2025-04-01 NOTE — ED PROVIDER NOTES
[facility]  EMERGENCY DEPARTMENT ENCOUNTER      Pt Name: Radha Coburn  MRN: [id number]  Birthdate 11/4/1933  Date of evaluation: 3/31/2025  Provider: Pepe Freedman MD    CHIEF COMPLAINT       Chief Complaint   Patient presents with    Constipation     Since friday     HISTORY OF PRESENT ILLNESS      Radha Coburn is a 89 y.o. female who presents to the emergency department for evaluation of constipation ongoing for the past 3 days.  Daughters present who states that she has had small rock hard stool passing.  No blood visualized.  Daughter has attempted to manually disimpact without success at home.  She became concerned today when she noticed that her abdomen appeared distended.  Patient is nonverbal but they do communicate via erase boards.  At home she not been complaining of abdominal pain for her daughter.    Daughter notes that she has had a \"nasty cough\" for the last 2 to 3 weeks.  She was seen in the emergency department on the 12th for this and her Lasix was refilled.  See review of recent ER, outpatient and telephone visit.    3/12/25- seen in ED for cough. Refilled rx for lasix as this had not been filled as it was listed as PRN.   3/19/25- followed up with Dr. Ramos. Discontinued amlodipine. Ordered swallow study but did not get done as she was eating on her own at home.  3/26/25- BP low at home, called Dr. Ramos. Collapsed while walking. Recommended patient be evaluated in the ER.   3/27/25- may need to change clonidine patch, cut Benicar 40mg 1/2 tablet daily    Daughter notes that blood pressures at home have improved after discontinuing all blood pressure medications aside from the clonidine patch.  During the episode where she collapsed while walking the daughter caught her she did not hit her head nor struck the ground.  She has had no further episodes of this since the blood pressure medications have been adjusted.  She denies any chest pain back pain or shortness of  identified.    Plan for CT imaging, blood work.  Patient is requiring oxygen therefore will also obtain cardiac workup in addition.    Amount and/or Complexity of Data Reviewed  Independent Historian: caregiver  Labs: ordered. Decision-making details documented in ED Course.  Radiology: ordered. Decision-making details documented in ED Course.  ECG/medicine tests: ordered.    Risk  Prescription drug management.  Decision regarding hospitalization.               SEP-1 CORE MEASURE DATA      Sepsis Criteria   Severe Sepsis Criteria   Septic Shock Criteria     Must be confirmed or suspected to move forward with diagnosis of sepsis.    Must meet 2:    [] Temperature > 100.9 F (38.3 C)        or < 96.8 F (36 C)  [x] HR > 90  [] RR > 20  [x] WBC > 12 or < 4 or 10% bands    AND:    [x] Infection Confirmed or        Suspected.    OR:    [] Exclude from SEP-1 because:    [] No infection present or suspected  [] Does not have 2+ SIRS criteria but may have an incidental infection that requires treatment  [] May have sepsis, but does not meet criteria for severe sepsis or septic shock  [] Alternative explanation for abnormal labs and/or vitals (see MDM)  [] Viral etiology found or highly suspected (including COVID-19) without concomitant bacterial infection   Must meet 1:    [] Lactate > 2       or   [] Signs of Organ Dysfunction:    - SBP < 90 or MAP < 65  - Altered mental status  - Creatinine > 2 or increased from      baseline  - Urine Output < 0.5 ml/kg/hr  - Bilirubin > 2  - INR > 1.5 (not anticoagulated)  - Platelets < 100,000  - Acute Respiratory Failure as     evidenced by new need for NIPPV     or mechanical ventilation        [x] No criteria met for Severe Sepsis.   Must meet 1:    [] Lactate > 4        or   [] SBP < 90 or MAP < 65 for at        least two readings in the first        hour after fluid bolus        administration      [] Vasopressors initiated (if hypotension persists after fluid

## 2025-04-01 NOTE — PROGRESS NOTES
Patient transferred from ED to Med Surg floor via stretcher to room 329. Report received from ED nurse.

## 2025-04-01 NOTE — PROGRESS NOTES
Physical Therapy  Facility/Department: [facility] MED SURG  Physical Therapy Initial Assessment    Name: Radha Coburn  : 1933  MRN: [id number]  Date of Service: 2025    Discharge Recommendations:  Continue to assess pending progress, Home with Home health PT   PT Equipment Recommendations  Equipment Needed: No      Patient Diagnosis(es): The primary encounter diagnosis was Pneumonia due to infectious organism, unspecified laterality, unspecified part of lung. A diagnosis of Constipation, unspecified constipation type was also pertinent to this visit.  Past Medical History:  has a past medical history of Allergic rhinitis, Anxiety, Arthritis, DVT (deep venous thrombosis) ([facility]), Essential hypertension, Hyperlipidemia, Hypertensive urgency, Impaired fasting glucose, Osteoarthritis of right hip / Total Right Arthroplasty , Osteoarthritis of right knee / Right TKA , PONV (postoperative nausea and vomiting), Shingles, and Stroke ([facility]).  Past Surgical History:  has a past surgical history that includes Appendectomy; Total hip arthroplasty (Right, 2014); Knee Arthroplasty (Right, ); Breast surgery (Left); eye surgery; joint replacement (Left, 2016); Knee arthroscopy (Left, 2016); and Injection Procedure For Sacroiliac Joint (Right, 10/30/2019).    Assessment  Body Structures, Functions, Activity Limitations Requiring Skilled Therapeutic Intervention: Decreased functional mobility ;Decreased ADL status;Decreased strength;Decreased endurance;Decreased balance;Decreased high-level IADLs;Decreased posture  Assessment: The patient is a 89 y.o. female who was admitted due to constipation.  On evaluation she demonstrates LE weakness, decreased activity endurance and impaired balance.  She would benefit from skilled PT to address her deficits to improve functional mobility.  Treatment Diagnosis: Generalized weakness, impaired balance  Therapy Prognosis: Good  Decision Making: Medium  : Unable to state goals.       Education  Patient Education  Education Given To: Patient  Education Provided: Role of Therapy;Plan of Care  Education Method: Verbal  Barriers to Learning: None  Education Outcome: Verbalized understanding      Therapy Time   Individual Concurrent Group Co-treatment   Time In 0705         Time Out 0725         Minutes 20         Timed Code Treatment Minutes: 20 Minutes       Luc Garcia PT , DPT, OCS, Cert. DN

## 2025-04-02 LAB
ANION GAP SERPL CALCULATED.3IONS-SCNC: 6 MMOL/L (ref 9–16)
BASOPHILS # BLD: <0.03 K/UL (ref 0–0.2)
BASOPHILS NFR BLD: 0 % (ref 0–2)
BUN SERPL-MCNC: 15 MG/DL (ref 8–23)
BUN/CREAT SERPL: 30 (ref 9–20)
CALCIUM SERPL-MCNC: 8.7 MG/DL (ref 8.6–10.4)
CHLORIDE SERPL-SCNC: 97 MMOL/L (ref 98–107)
CO2 SERPL-SCNC: 28 MMOL/L (ref 20–31)
CREAT SERPL-MCNC: 0.5 MG/DL (ref 0.5–0.9)
EOSINOPHIL # BLD: 0.45 K/UL (ref 0–0.44)
EOSINOPHILS RELATIVE PERCENT: 5 % (ref 1–4)
ERYTHROCYTE [DISTWIDTH] IN BLOOD BY AUTOMATED COUNT: 15.2 % (ref 11.8–14.4)
GFR, ESTIMATED: 87 ML/MIN/1.73M2
GLUCOSE SERPL-MCNC: 124 MG/DL (ref 74–99)
HCT VFR BLD AUTO: 33.3 % (ref 36.3–47.1)
HGB BLD-MCNC: 10.7 G/DL (ref 11.9–15.1)
IMM GRANULOCYTES # BLD AUTO: 0.05 K/UL (ref 0–0.3)
IMM GRANULOCYTES NFR BLD: 1 %
LYMPHOCYTES NFR BLD: 1.71 K/UL (ref 1.1–3.7)
LYMPHOCYTES RELATIVE PERCENT: 19 % (ref 24–43)
MCH RBC QN AUTO: 30.3 PG (ref 25.2–33.5)
MCHC RBC AUTO-ENTMCNC: 32.1 G/DL (ref 28.4–34.8)
MCV RBC AUTO: 94.3 FL (ref 82.6–102.9)
MONOCYTES NFR BLD: 1.05 K/UL (ref 0.1–1.2)
MONOCYTES NFR BLD: 12 % (ref 3–12)
NEUTROPHILS NFR BLD: 63 % (ref 36–65)
NEUTS SEG NFR BLD: 5.89 K/UL (ref 1.5–8.1)
NRBC BLD-RTO: 0 PER 100 WBC
PLATELET # BLD AUTO: 251 K/UL (ref 138–453)
PMV BLD AUTO: 9.6 FL (ref 8.1–13.5)
POTASSIUM SERPL-SCNC: 4.2 MMOL/L (ref 3.7–5.3)
PROCALCITONIN SERPL-MCNC: 0.04 NG/ML (ref 0–0.09)
RBC # BLD AUTO: 3.53 M/UL (ref 3.95–5.11)
SODIUM SERPL-SCNC: 131 MMOL/L (ref 136–145)
WBC OTHER # BLD: 9.2 K/UL (ref 3.5–11.3)

## 2025-04-02 PROCEDURE — 85025 COMPLETE CBC W/AUTO DIFF WBC: CPT

## 2025-04-02 PROCEDURE — 2500000003 HC RX 250 WO HCPCS

## 2025-04-02 PROCEDURE — 6360000002 HC RX W HCPCS: Performed by: NURSE PRACTITIONER

## 2025-04-02 PROCEDURE — 94664 DEMO&/EVAL PT USE INHALER: CPT

## 2025-04-02 PROCEDURE — 97110 THERAPEUTIC EXERCISES: CPT

## 2025-04-02 PROCEDURE — 1200000000 HC SEMI PRIVATE

## 2025-04-02 PROCEDURE — 94640 AIRWAY INHALATION TREATMENT: CPT

## 2025-04-02 PROCEDURE — 6370000000 HC RX 637 (ALT 250 FOR IP): Performed by: INTERNAL MEDICINE

## 2025-04-02 PROCEDURE — 6370000000 HC RX 637 (ALT 250 FOR IP)

## 2025-04-02 PROCEDURE — 36415 COLL VENOUS BLD VENIPUNCTURE: CPT

## 2025-04-02 PROCEDURE — 97116 GAIT TRAINING THERAPY: CPT

## 2025-04-02 PROCEDURE — 80048 BASIC METABOLIC PNL TOTAL CA: CPT

## 2025-04-02 PROCEDURE — 2700000000 HC OXYGEN THERAPY PER DAY

## 2025-04-02 PROCEDURE — 2580000003 HC RX 258

## 2025-04-02 PROCEDURE — 92526 ORAL FUNCTION THERAPY: CPT

## 2025-04-02 PROCEDURE — 2500000003 HC RX 250 WO HCPCS: Performed by: NURSE PRACTITIONER

## 2025-04-02 PROCEDURE — 94669 MECHANICAL CHEST WALL OSCILL: CPT

## 2025-04-02 PROCEDURE — 6360000002 HC RX W HCPCS

## 2025-04-02 PROCEDURE — 94761 N-INVAS EAR/PLS OXIMETRY MLT: CPT

## 2025-04-02 RX ADMIN — METHYLPREDNISOLONE SODIUM SUCCINATE 60 MG: 125 INJECTION, POWDER, LYOPHILIZED, FOR SOLUTION INTRAMUSCULAR; INTRAVENOUS at 20:29

## 2025-04-02 RX ADMIN — ENOXAPARIN SODIUM 40 MG: 100 INJECTION SUBCUTANEOUS at 10:06

## 2025-04-02 RX ADMIN — WATER 1000 MG: 1 INJECTION INTRAMUSCULAR; INTRAVENOUS; SUBCUTANEOUS at 23:45

## 2025-04-02 RX ADMIN — IPRATROPIUM BROMIDE AND ALBUTEROL SULFATE 1 DOSE: .5; 2.5 SOLUTION RESPIRATORY (INHALATION) at 10:42

## 2025-04-02 RX ADMIN — AZITHROMYCIN MONOHYDRATE 500 MG: 500 INJECTION, POWDER, LYOPHILIZED, FOR SOLUTION INTRAVENOUS at 20:35

## 2025-04-02 RX ADMIN — FUROSEMIDE 40 MG: 40 TABLET ORAL at 10:06

## 2025-04-02 RX ADMIN — SODIUM CHLORIDE, PRESERVATIVE FREE 10 ML: 5 INJECTION INTRAVENOUS at 10:10

## 2025-04-02 RX ADMIN — METHYLPREDNISOLONE SODIUM SUCCINATE 60 MG: 125 INJECTION, POWDER, LYOPHILIZED, FOR SOLUTION INTRAMUSCULAR; INTRAVENOUS at 10:11

## 2025-04-02 RX ADMIN — IPRATROPIUM BROMIDE AND ALBUTEROL SULFATE 1 DOSE: .5; 2.5 SOLUTION RESPIRATORY (INHALATION) at 15:55

## 2025-04-02 RX ADMIN — MIRTAZAPINE 7.5 MG: 15 TABLET, FILM COATED ORAL at 20:24

## 2025-04-02 RX ADMIN — ASPIRIN 81 MG: 81 TABLET, COATED ORAL at 20:24

## 2025-04-02 RX ADMIN — ROSUVASTATIN CALCIUM 40 MG: 40 TABLET, FILM COATED ORAL at 20:24

## 2025-04-02 RX ADMIN — POLYETHYLENE GLYCOL 3350 17 G: 17 POWDER, FOR SOLUTION ORAL at 10:06

## 2025-04-02 RX ADMIN — WATER 1000 MG: 1 INJECTION INTRAMUSCULAR; INTRAVENOUS; SUBCUTANEOUS at 00:46

## 2025-04-02 RX ADMIN — SODIUM CHLORIDE, PRESERVATIVE FREE 10 ML: 5 INJECTION INTRAVENOUS at 20:27

## 2025-04-02 RX ADMIN — IPRATROPIUM BROMIDE AND ALBUTEROL SULFATE 1 DOSE: .5; 2.5 SOLUTION RESPIRATORY (INHALATION) at 05:17

## 2025-04-02 RX ADMIN — IPRATROPIUM BROMIDE AND ALBUTEROL SULFATE 1 DOSE: .5; 2.5 SOLUTION RESPIRATORY (INHALATION) at 21:25

## 2025-04-02 RX ADMIN — ALPRAZOLAM 0.25 MG: 0.25 TABLET ORAL at 16:19

## 2025-04-02 NOTE — PROGRESS NOTES
Afternoon assessment and vitals completed at this time. Patient resting in bed, daughter bedside, patient able to answer simple questions with nodding/shaking head. Patient nonverbal from h/o stroke. Respirations Easy and Nonlabored. Patient denies pain. Lung sounds clear but diminished throughout. Daughter and patient denies any needs or concerns at this time. Assessment and vital signs completed as charted. Care ongoing.

## 2025-04-02 NOTE — PROGRESS NOTES
Progress Note    SUBJECTIVE:    Patient seen for f/u of Acute pneumonia.  She sitting up in chair  on 2L of oxygen. No distress. Feels better    ROS:   Constitutional: negative  for fevers, and negative for chills.  Respiratory: negative for shortness of breath, positive for cough, and negative for wheezing  Cardiovascular: negative for chest pain, and negative for palpitations  Gastrointestinal: negative for abdominal pain, negative for nausea,negative for vomiting, negative for diarrhea, and negative for constipation     All other systems were reviewed with the patient and are negative unless otherwise stated in HPI      OBJECTIVE:      Vitals:   Vitals:    04/02/25 0030   BP: 118/68   Pulse: 95   Resp: 16   Temp:    SpO2: 94%     Weight - Scale: 78.5 kg (173 lb 1.6 oz)   Height: 170.2 cm (5' 7\")     Weight  Wt Readings from Last 3 Encounters:   04/02/25 78.5 kg (173 lb 1.6 oz)   03/19/25 72.3 kg (159 lb 8 oz)   03/01/25 78.9 kg (174 lb)     Body mass index is 27.11 kg/m².    24HR INTAKE/OUTPUT:      Intake/Output Summary (Last 24 hours) at 4/2/2025 0948  Last data filed at 4/1/2025 1239  Gross per 24 hour   Intake 400 ml   Output 200 ml   Net 200 ml     -----------------------------------------------------------------  Exam:    GEN:    Awake, alert but aphasic .   EYES:  EOMI, pupils equal   NECK: Supple. No lymphadenopathy.  No carotid bruit  CVS:    regular rate and rhythm, no audible murmur  PULM:   few scattered end exp wheeze with scattered rhonchi , no acute respiratory distress  ABD:    Bowels sounds normal.  Abdomen is soft.  No distention.  no tenderness to palpation.   EXT:   no edema bilaterally .  No calf tenderness.   NEURO: Moves all extremities.  Motor and sensory are grossly intact  SKIN:  No rashes.  No skin lesions.    -----------------------------------------------------------------    Diagnostic Data:      Complete Blood Count:   Recent Labs     03/31/25  2150 04/01/25  0805 04/02/25  0635  Contrast? None   Final Result   1. No acute intra-abdominal or pelvic process.   2. Moderate to large volume of stool in the colon.  Please correlate with   clinical symptoms of constipation.   3. Moderate hiatal.   4. Persistent inter and intra lobular septal thickening in the lung   periphery. Findings are nonspecific but can be seen in the setting of   interstitial lung disease.               ASSESSMENT / PLAN:    MEDICAL DECISION MAKING:    Primary Problem(s): Acute pneumonia  Differential diagnoses: Viral illness, CHF  Condition is an undiagnosed new problem with uncertain prognosis  Condition is stable  Treatment plan:   BC x 2-no growth  RVP-pending  Oxygen therapy protocol  Monitor labs and replace electrolytes  SLP eval  Imaging: no further imaging studies ordered today  Medications:   Continue Rocephin and Zithromax  Continue nebs  Start Solumedrol  Medication Monitoring / High Risk Medications: none      Constipation  Condition is improving  Treatment plan:   Milk and molasses enema given  Consult dietitian for assistance with dysphagia diet  Imaging: no further imaging studies ordered today  Medications:   Continue MiraLAX daily    Nutrition status:   at risk for malnutrition  Dietician consult initiated  I/O  Daily weight  Monitor Daily intake  Nutritional Supplements as tolerated    MALNUTRITION ASSESSMENT AND PLAN    The following was documented by the Dietitian:    Malnutrition Assessment  Context of Malnutrition: Acute Illness (04/01/25 0618)  Acute Illness - Energy Intake : Unable to assess (04/01/25 0618)  Acute Illness - Weight Loss : Mild weight loss (10# x 5 months) (04/01/25 0618)  Acute Illness - Body Fat Loss: No body fat loss (04/01/25 0618)  Acute Illness - Muscle Mass Loss: No muscle mass loss (04/01/25 0618)  Acute Illness - Fluid Accumulation : Mild (04/01/25 0618)  Acute Illness - Fluid Accumulation Location: Extremities (trace BLE edema) (04/01/25 0618)  Acute Illness -  Strength:

## 2025-04-02 NOTE — PROGRESS NOTES
Occupational Therapy  Facility/Department: [facility] MED SURG  Daily Treatment Note  NAME: Radha Coburn  : 1933  MRN: [id number]    Date of Service: 2025    Discharge Recommendations:  Continue to assess pending progress         Patient Diagnosis(es): The primary encounter diagnosis was Pneumonia due to infectious organism, unspecified laterality, unspecified part of lung. A diagnosis of Constipation, unspecified constipation type was also pertinent to this visit.     Assessment   Activity Tolerance: Patient tolerated treatment well;Patient limited by fatigue  Discharge Recommendations: Continue to assess pending progress     Plan  Occupational Therapy Plan  Times Per Day: Once a day  Days Per Week: 7 Days  Current Treatment Recommendations: Strengthening;Balance training;Functional mobility training;Endurance training;Safety education & training;Patient/Caregiver education & training;Equipment evaluation, education, & procurement;Self-Care / ADL;ROM    Restrictions   General, fall risk.     Subjective  Subjective: Pt received lying in bed upon arrival. Pt agreed to OT tx. Pt is non verbal.   Pain: Pt had no complaints of pain.       Objective  Vitals     OT Exercises  Exercise Treatment: Pt completed BUE Ther ex to increase strength needed for ease of ADLs. Pt completed red digiflex x20, yellow flex bar bends x15, and 1# freeweight x15, x all planes. Pt req min RBs as needed secondary to fatigue and min VC to complete exercises w correct form.     Safety Devices  Type of Devices: All fall risk precautions in place;Bed alarm in place;Call light within reach;Left in bed;Chair alarm in place    Patient Education  Education Given To: Patient  Education Provided: Role of Therapy;Plan of Care;Home Exercise Program  Education Method: Demonstration;Verbal  Barriers to Learning: Other (Comment) (Overall Pt had good understanding during Ther ex but was non verbal making it hard to communicate when things such as

## 2025-04-02 NOTE — PROGRESS NOTES
Physical Therapy  Facility/Department: [facility] MED SURG  Daily Treatment Note  NAME: Radha Coburn  : 1933  MRN: [id number]    Date of Service: 2025    Discharge Recommendations:  Continue to assess pending progress, Home with Home health PT      Patient Diagnosis(es): The primary encounter diagnosis was Pneumonia due to infectious organism, unspecified laterality, unspecified part of lung. A diagnosis of Constipation, unspecified constipation type was also pertinent to this visit.    Assessment  Assessment: Transfers: CGA/Min A x1. Pt ambulated 40ft with WW and gait belt. Pt needed VC for safety awareness. Pt completed reclined B LE exercises x10 reps in all available planes. Pt can use continued education for HEP and safety awareness.  Activity Tolerance: Patient tolerated treatment well    Plan  Physical Therapy Plan  General Plan: 2 times a day 7 days a week  Current Treatment Recommendations: Strengthening;ROM;Functional mobility training;Balance training;Transfer training;ADL/Self-care training;IADL training;Endurance training;Manual;Neuromuscular re-education;Stair training;Gait training;Home exercise program;Safety education & training;Patient/Caregiver education & training;Modalities;Positioning;Therapeutic activities    Restrictions  Restrictions/Precautions  Restrictions/Precautions: Fall Risk, Isolation  Activity Level: Up as Tolerated  Required Braces or Orthoses?: No     Subjective   Subjective  Subjective: Pt in chair upon arrival, agreeable to therapy at this time.  Pain: denies    Objective  Bed Mobility Training  Bed Mobility Training: No  Transfer Training  Transfer Training: Yes  Overall Level of Assistance: Minimal assistance;Contact guard assistance  Interventions: Demonstration;Verbal cues  Sit to Stand: Minimal assistance;Contact guard assistance  Stand to Sit: Minimal assistance;Contact guard assistance  Gait  Gait Training: Yes  Overall Level of Assistance: Contact guard  assistance;Minimal assistance  Distance (ft): 40 Feet  Assistive Device: Walker, rolling;Gait belt  Interventions: Demonstration;Verbal cues  Base of Support: Narrowed  Speed/Salima: Shuffled  Step Length: Left shortened;Right shortened     PT Exercises  Exercise Treatment: Reclined B LE exercises x10 reps in all available planes     Safety Devices  Type of Devices: All fall risk precautions in place;Call light within reach;Chair alarm in place;Left in chair       Goals  Short Term Goals  Time Frame for Short Term Goals: 10 days.  Short Term Goal 1: Patient will ambulate 50' with FWW, supervision, without LOB  Short Term Goal 2: Patient will peform transfers and bed mobility tasks with MI  Short Term Goal 3: Patient will tolerate 20-30 minutes of therex/act to improve endurance for ADLs.  Patient Goals   Patient Goals : Unable to state goals.    Education  Patient Education  Education Given To: Patient  Education Provided: Role of Therapy;Plan of Care;Mobility Training  Education Method: Verbal  Barriers to Learning: None  Education Outcome: Demonstrated understanding    Therapy Time   Individual Concurrent Group Co-treatment   Time In 1022         Time Out 1047         Minutes 25               Treated by SVETLANA Brown  Supervised by Yarelis Eddy PTA

## 2025-04-02 NOTE — PROGRESS NOTES
[facility]  INPATIENT SPEECH THERAPY  [facility] MED SURG  Dysphagia Treatment      Date: 2025  Patient Name: Radha Coburn      CSN: [id number]   : 1933  (89 y.o.)  Gender: female   Referring Physician:  Amandeep Ramos MD  Diagnosis: R13.12 Oropharyngeal Dysphagia  Precautions: Fall risk, aspiration risk  Current Diet: Minced and Moist  Respiratory Status: Nasal Canula: 2L of oxygen  Swallowing Strategies: Standard Universal Swallow Precautions  Date of Last MBS/FEES: Not Applicable    Pain:  No pain reported.    Subjective:  Patient seen sitting upright for dysphagia treatment this date. Patient is alert and appeared to be oriented, however orientation questions unable to be answered due to aphasia. Patient reported no pain at this time and that she had no complications with yogurt for breakfast. Patient was able to participate in therapeutic swallowing exercises this date with familiar clinician.     Short-Term Goals:  Short-Term Goal Timeframe: 7 Days    SHORT TERM GOAL #1:  Goal 1: Patient will complete oropharyngeal swallowing exercises 10-15x each given minimal verbal cues.  INTERVENTIONS: Patient was able to complete effortful swallow x3 , chin tuck against resistance x10 (2-3 seconds for each hold), and patient unable to complete meghan exercise this date due to suspected motor planning deficits.      SHORT TERM GOAL #2:  Goal 2: Patient will consume advanced trials of soft and bite sized solids with adequate bolus prepataration/propulsion without overt s/sx of aspiration/penetration in 70% of opportunities.  INTERVENTIONS: Patient was able to consume x3 trials of soft and bite sized solids without overt s/sx of aspiration/penetration. However, patient had decreased bolus preparation and propulsion abilities as noted by oral residues for all trials. Patient required verbal and visual cues to utilize lingual sweep to clear oral residues, although this was unsuccessful and required a liquid  wash to completely clear oral cavity.     SHORT TERM GOAL #3:  Goal 3: Patient will utilize compensatory swallowing strategies (small bites/sips, pacing/slow rate, alternate bites/sips, etc) with 80% accuracy during a meal or snack.  INTERVENTIONS: Patient did not utilize small sips this date, instead utilized sequential sips of thin liquids via straw. Patient required verbal cues to use smaller sips. Patient also required cues to utilize alternating bites and sips in order to clear oral residues.      Long-Term Goals:  Long-Term Goal Timeframe: 14 Days     LONG TERM GOAL #1:   Patient will tolerate safest, least restrictive diet without overt s/sx of aspiration or penetration in 90% of opportunities.       Dysphagia Outcome and Severity Scale: 3- Moderate dysphagia- Total assistance, supervision, or strategies. Two or more diet consistencies restricted.    EDUCATION:  Learner: Patient  Education:  Reviewed diet and strategies, Reviewed recommendations for follow-up, and Education Related to Health Promotion and Wellness  Evaluation of Education: Needs further instruction and Family not present    ASSESSMENT/PLAN:  Activity Tolerance:  Patient tolerance of  treatment: fair.      Assessment/Plan: Patient progressing toward established goals.  Continues to require skilled care of licensed speech pathologist to progress toward achievement of established goals and plan of care..     Plan for Next Session: Continue advanced PO trials and complete laryngeal exercises.  Discharge Recommendations: Ongoing speech therapy is recommended at this level of care    TIME   SLP Individual Minutes  Time In: 1002  Time Out: 1025  Minutes: 23    Electronically Signed by Letitia PARKER, Graduate SLP Student Clinician

## 2025-04-02 NOTE — PROGRESS NOTES
Physical Therapy  Facility/Department: [facility] MED SURG  Daily Treatment Note  NAME: Radha Coburn  : 1933  MRN: [id number]    Date of Service: 2025    Discharge Recommendations:  Continue to assess pending progress, Home with Home health PT      Patient Diagnosis(es): The primary encounter diagnosis was Pneumonia due to infectious organism, unspecified laterality, unspecified part of lung. A diagnosis of Constipation, unspecified constipation type was also pertinent to this visit.    Assessment  Assessment: Pt bed mobility SBA with occasional cues for hand placement. Pt transfers CGA, cues given with tolieting transfer for proper hand placement. Pt completed supine and seated exer B LE x15 in all available planes of motion. Pt ambulating 40 ft x2 with FWW, CGA, Gait belt with cues for increasing B step length.  Activity Tolerance: Patient tolerated treatment well;Patient limited by fatigue    Plan  Physical Therapy Plan  General Plan: 2 times a day 7 days a week  Current Treatment Recommendations: Strengthening;ROM;Functional mobility training;Balance training;Transfer training;ADL/Self-care training;IADL training;Endurance training;Manual;Neuromuscular re-education;Stair training;Gait training;Home exercise program;Safety education & training;Patient/Caregiver education & training;Modalities;Positioning;Therapeutic activities    Restrictions  Restrictions/Precautions  Restrictions/Precautions: Fall Risk, Isolation  Activity Level: Up as Tolerated  Required Braces or Orthoses?: No     Subjective   Subjective  Subjective: Pt in bed upon arrival, agreeable to therapy at this time.  Pain: denies    Objective  Bed Mobility Training  Bed Mobility Training: Yes  Overall Level of Assistance: Stand by assistance  Interventions: Demonstration;Verbal cues  Rolling: Stand by assistance  Supine to Sit: Stand by assistance  Sit to Supine: Stand by assistance  Scooting: Stand by assistance  Balance  Sitting: Without

## 2025-04-02 NOTE — PROGRESS NOTES
Checked in with patient prior to shift change, denies needs or concerns at this time. Call light within reach. Care ongoing.

## 2025-04-02 NOTE — PLAN OF CARE
Problem: Chronic Conditions and Co-morbidities  Goal: Patient's chronic conditions and co-morbidity symptoms are monitored and maintained or improved  Outcome: Progressing  Flowsheets (Taken 4/2/2025 1451)  Care Plan - Patient's Chronic Conditions and Co-Morbidity Symptoms are Monitored and Maintained or Improved: Monitor and assess patient's chronic conditions and comorbid symptoms for stability, deterioration, or improvement     Problem: Discharge Planning  Goal: Discharge to home or other facility with appropriate resources  Outcome: Progressing  Flowsheets (Taken 4/2/2025 1451)  Discharge to home or other facility with appropriate resources: Identify barriers to discharge with patient and caregiver     Problem: Safety - Adult  Goal: Free from fall injury  Outcome: Progressing  Flowsheets (Taken 4/2/2025 1451)  Free From Fall Injury: Instruct family/caregiver on patient safety     Problem: ABCDS Injury Assessment  Goal: Absence of physical injury  Outcome: Progressing  Flowsheets (Taken 4/2/2025 1451)  Absence of Physical Injury: Implement safety measures based on patient assessment     Problem: Pain  Goal: Verbalizes/displays adequate comfort level or baseline comfort level  Outcome: Progressing  Flowsheets (Taken 4/2/2025 1451)  Verbalizes/displays adequate comfort level or baseline comfort level:   Assess pain using appropriate pain scale   Encourage patient to monitor pain and request assistance     Problem: Nutrition Deficit:  Goal: Optimize nutritional status  Outcome: Progressing  Flowsheets (Taken 4/2/2025 1451)  Nutrient intake appropriate for improving, restoring, or maintaining nutritional needs: Monitor oral intake, labs, and treatment plans     Problem: Neurosensory - Adult  Goal: Achieves stable or improved neurological status  Outcome: Progressing  Flowsheets (Taken 4/2/2025 1451)  Achieves stable or improved neurological status: Assess for and report changes in neurological status     Problem:

## 2025-04-02 NOTE — PROGRESS NOTES
RESPIRATORY ASSESSMENT PROTOCOL                                                                                              Patient Name: Radha Coburn Room#: 0329/0329-01 : 1933     Admitting diagnosis: Constipation, unspecified constipation type [K59.00]  Pneumonia due to infectious organism, unspecified laterality, unspecified part of lung [J18.9]  Acute pneumonia [J18.9]       Medical History:   Past Medical History:   Diagnosis Date    Allergic rhinitis     Anxiety     Arthritis     DVT (deep venous thrombosis) (HCC) 1970    rt leg       Essential hypertension     Hyperlipidemia     Hypertensive urgency 2023    Impaired fasting glucose 2010    Osteoarthritis of right hip / Total Right Arthroplasty 2014    Osteoarthritis of right knee / Right TKA 2014    PONV (postoperative nausea and vomiting)     Shingles 2009    left arm    Stroke ([facility])     Cerebral atherosclerosis MRI confirmed       PATIENT ASSESSMENT    LABORATORY DATA  Hematology:   Lab Results   Component Value Date/Time    WBC 9.2 2025 06:35 AM    RBC 3.53 2025 06:35 AM    HGB 10.7 2025 06:35 AM    HCT 33.3 2025 06:35 AM     2025 06:35 AM     Chemistry:  No results found for: \"PHART\", \"RIT9EXD\", \"PO2ART\", \"T8PANNNM\", \"XBB3NFU\", \"PBEA\", \"NBEA\"    VITALS  Pulse: (!) 105   Respirations: 20  BP: 119/79  SpO2: 93 % O2 Device: Nasal cannula  Temp: 97.1 °F (36.2 °C)    SKIN COLOR  [x] Normal  [] Pale  [] Dusky  [] Cyanotic    RESPIRATORY PATTERN  [x] Normal  [] Dyspnea  [] Cheyne-Singer  [] Kussmaul  [] Biots    AMBULATORY  [] Yes  [] No  [x] With Assistance            Patient Acuity 0 1 2 3 4 Score   Level of Consciousness (LOC) [x]  Alert & Oriented or Pt normal LOC []  Confused;follows directions []  Confused & uncooper-ative []  Obtunded []  Comatose 0   Respiratory Rate  (RR) [x]  Reg. rate & pattern. 12 - 20 bpm  []  Increased RR. Greater than 20 bpm   []  SOB

## 2025-04-02 NOTE — PROGRESS NOTES
RN in to assess patient. Patient resting in chair, patient nonverbal d/t history of stroke. Patient able to answer simple questions with nod or shaking head patient does also have white board to use for communication if needed, Respirations Easy and Nonlabored. Lung sounds clear diminished throughout, except expiratory wheezing to LOGAN. Patient denies pain, NVPS 0. Assessment and vital signs completed as charted. Call light within reach, declines needs or concerns at this time. Care ongoing.

## 2025-04-03 VITALS
SYSTOLIC BLOOD PRESSURE: 126 MMHG | WEIGHT: 174.1 LBS | BODY MASS INDEX: 27.33 KG/M2 | RESPIRATION RATE: 20 BRPM | HEIGHT: 67 IN | HEART RATE: 94 BPM | OXYGEN SATURATION: 93 % | TEMPERATURE: 97 F | DIASTOLIC BLOOD PRESSURE: 83 MMHG

## 2025-04-03 LAB
ANION GAP SERPL CALCULATED.3IONS-SCNC: 8 MMOL/L (ref 9–16)
BASOPHILS # BLD: <0.03 K/UL (ref 0–0.2)
BASOPHILS NFR BLD: 0 % (ref 0–2)
BUN SERPL-MCNC: 15 MG/DL (ref 8–23)
BUN/CREAT SERPL: 30 (ref 9–20)
CALCIUM SERPL-MCNC: 9.1 MG/DL (ref 8.6–10.4)
CHLORIDE SERPL-SCNC: 95 MMOL/L (ref 98–107)
CO2 SERPL-SCNC: 28 MMOL/L (ref 20–31)
CREAT SERPL-MCNC: 0.5 MG/DL (ref 0.5–0.9)
EOSINOPHIL # BLD: <0.03 K/UL (ref 0–0.44)
EOSINOPHILS RELATIVE PERCENT: 0 % (ref 1–4)
ERYTHROCYTE [DISTWIDTH] IN BLOOD BY AUTOMATED COUNT: 14.6 % (ref 11.8–14.4)
GFR, ESTIMATED: >90 ML/MIN/1.73M2
GLUCOSE SERPL-MCNC: 191 MG/DL (ref 74–99)
HCT VFR BLD AUTO: 34.3 % (ref 36.3–47.1)
HGB BLD-MCNC: 10.9 G/DL (ref 11.9–15.1)
IMM GRANULOCYTES # BLD AUTO: 0.08 K/UL (ref 0–0.3)
IMM GRANULOCYTES NFR BLD: 1 %
LYMPHOCYTES NFR BLD: 0.94 K/UL (ref 1.1–3.7)
LYMPHOCYTES RELATIVE PERCENT: 8 % (ref 24–43)
MCH RBC QN AUTO: 29.5 PG (ref 25.2–33.5)
MCHC RBC AUTO-ENTMCNC: 31.8 G/DL (ref 28.4–34.8)
MCV RBC AUTO: 93 FL (ref 82.6–102.9)
MONOCYTES NFR BLD: 0.62 K/UL (ref 0.1–1.2)
MONOCYTES NFR BLD: 5 % (ref 3–12)
NEUTROPHILS NFR BLD: 86 % (ref 36–65)
NEUTS SEG NFR BLD: 10.61 K/UL (ref 1.5–8.1)
NRBC BLD-RTO: 0 PER 100 WBC
PLATELET # BLD AUTO: 290 K/UL (ref 138–453)
PMV BLD AUTO: 9.6 FL (ref 8.1–13.5)
POTASSIUM SERPL-SCNC: 4.5 MMOL/L (ref 3.7–5.3)
RBC # BLD AUTO: 3.69 M/UL (ref 3.95–5.11)
SODIUM SERPL-SCNC: 131 MMOL/L (ref 136–145)
WBC OTHER # BLD: 12.3 K/UL (ref 3.5–11.3)

## 2025-04-03 PROCEDURE — 6370000000 HC RX 637 (ALT 250 FOR IP)

## 2025-04-03 PROCEDURE — 97110 THERAPEUTIC EXERCISES: CPT

## 2025-04-03 PROCEDURE — 94640 AIRWAY INHALATION TREATMENT: CPT

## 2025-04-03 PROCEDURE — 97116 GAIT TRAINING THERAPY: CPT

## 2025-04-03 PROCEDURE — 6370000000 HC RX 637 (ALT 250 FOR IP): Performed by: NURSE PRACTITIONER

## 2025-04-03 PROCEDURE — 2500000003 HC RX 250 WO HCPCS: Performed by: NURSE PRACTITIONER

## 2025-04-03 PROCEDURE — 2700000000 HC OXYGEN THERAPY PER DAY

## 2025-04-03 PROCEDURE — 36415 COLL VENOUS BLD VENIPUNCTURE: CPT

## 2025-04-03 PROCEDURE — 92526 ORAL FUNCTION THERAPY: CPT

## 2025-04-03 PROCEDURE — 80048 BASIC METABOLIC PNL TOTAL CA: CPT

## 2025-04-03 PROCEDURE — 6360000002 HC RX W HCPCS

## 2025-04-03 PROCEDURE — 85025 COMPLETE CBC W/AUTO DIFF WBC: CPT

## 2025-04-03 PROCEDURE — 2500000003 HC RX 250 WO HCPCS

## 2025-04-03 PROCEDURE — 6370000000 HC RX 637 (ALT 250 FOR IP): Performed by: INTERNAL MEDICINE

## 2025-04-03 PROCEDURE — 94618 PULMONARY STRESS TESTING: CPT

## 2025-04-03 PROCEDURE — 6360000002 HC RX W HCPCS: Performed by: NURSE PRACTITIONER

## 2025-04-03 PROCEDURE — 94761 N-INVAS EAR/PLS OXIMETRY MLT: CPT

## 2025-04-03 PROCEDURE — 94669 MECHANICAL CHEST WALL OSCILL: CPT

## 2025-04-03 RX ORDER — CARVEDILOL 3.12 MG/1
3.12 TABLET ORAL 2 TIMES DAILY WITH MEALS
Status: DISCONTINUED | OUTPATIENT
Start: 2025-04-03 | End: 2025-04-03 | Stop reason: HOSPADM

## 2025-04-03 RX ORDER — ALBUTEROL SULFATE 0.83 MG/ML
2.5 SOLUTION RESPIRATORY (INHALATION) EVERY 4 HOURS PRN
Qty: 120 EACH | Refills: 3 | Status: SHIPPED | OUTPATIENT
Start: 2025-04-03

## 2025-04-03 RX ORDER — POLYETHYLENE GLYCOL 3350 17 G/17G
17 POWDER, FOR SOLUTION ORAL DAILY
Qty: 527 G | Refills: 1 | Status: SHIPPED | OUTPATIENT
Start: 2025-04-04 | End: 2025-05-04

## 2025-04-03 RX ORDER — LEVOFLOXACIN 750 MG/1
750 TABLET, FILM COATED ORAL DAILY
Qty: 7 TABLET | Refills: 0 | Status: SHIPPED | OUTPATIENT
Start: 2025-04-03 | End: 2025-04-10

## 2025-04-03 RX ORDER — CARVEDILOL 3.12 MG/1
3.12 TABLET ORAL 2 TIMES DAILY WITH MEALS
Qty: 60 TABLET | Refills: 3 | Status: SHIPPED | OUTPATIENT
Start: 2025-04-03

## 2025-04-03 RX ADMIN — SODIUM CHLORIDE, PRESERVATIVE FREE 10 ML: 5 INJECTION INTRAVENOUS at 09:00

## 2025-04-03 RX ADMIN — FUROSEMIDE 40 MG: 40 TABLET ORAL at 08:59

## 2025-04-03 RX ADMIN — CARVEDILOL 3.12 MG: 3.12 TABLET, FILM COATED ORAL at 08:59

## 2025-04-03 RX ADMIN — POLYETHYLENE GLYCOL 3350 17 G: 17 POWDER, FOR SOLUTION ORAL at 08:59

## 2025-04-03 RX ADMIN — IPRATROPIUM BROMIDE AND ALBUTEROL SULFATE 1 DOSE: .5; 2.5 SOLUTION RESPIRATORY (INHALATION) at 10:24

## 2025-04-03 RX ADMIN — METHYLPREDNISOLONE SODIUM SUCCINATE 60 MG: 125 INJECTION, POWDER, LYOPHILIZED, FOR SOLUTION INTRAMUSCULAR; INTRAVENOUS at 10:38

## 2025-04-03 RX ADMIN — ENOXAPARIN SODIUM 40 MG: 100 INJECTION SUBCUTANEOUS at 08:59

## 2025-04-03 RX ADMIN — IPRATROPIUM BROMIDE AND ALBUTEROL SULFATE 1 DOSE: .5; 2.5 SOLUTION RESPIRATORY (INHALATION) at 05:11

## 2025-04-03 NOTE — PROGRESS NOTES
Occupational Therapy  Facility/Department: [facility] MED SURG  Daily Treatment Note  NAME: Radha Coburn  : 1933  MRN: [id number]    Date of Service: 4/3/2025    Discharge Recommendations:  Continue to assess pending progress         Patient Diagnosis(es): The primary encounter diagnosis was Pneumonia due to infectious organism, unspecified laterality, unspecified part of lung. A diagnosis of Constipation, unspecified constipation type was also pertinent to this visit.     Assessment   Activity Tolerance: Patient limited by fatigue;Patient tolerated treatment well  Discharge Recommendations: Continue to assess pending progress     Plan  Occupational Therapy Plan  Times Per Day: Once a day  Days Per Week: 7 Days  Current Treatment Recommendations: Strengthening;Balance training;Functional mobility training;Endurance training;Safety education & training;Patient/Caregiver education & training;Equipment evaluation, education, & procurement;Self-Care / ADL;ROM    Restrictions   General, fall risk.     Subjective  Subjective: Pt received lying in bed upon arrival. Pt inidcated agreement to OT tx by shaking her head yes.  Pain: Pt unable to communicate this portion for me as she is non verbal.          Objective  Vitals    OT Exercises  Exercise Treatment: Pt completed BUE Ther ex to increase strength needed for ease of ADLs. Pt completed red digiflex x20, yellow flex bar bends x15, and 1# freeweight x15, x all planes. Pt req RBs as needed secondary to fatigue and min VC to complete exercises w correct form.     Safety Devices  Type of Devices: All fall risk precautions in place;Call light within reach;Left in bed;Bed alarm in place    Patient Education  Education Given To: Patient  Education Provided: Role of Therapy;Plan of Care;Home Exercise Program    Goals  Short Term Goals  Time Frame for Short Term Goals: 21 visits  Short Term Goal 1: Patient to be educated on d/c folder, AE/DME and home safety to ensure safe

## 2025-04-03 NOTE — PROGRESS NOTES
A home oxygen evaluation has been completed.       Patient was placed on room air for 2 minutes. SpO2 was 85 % on room air at rest. Patients SpO2 was below 88% and qualified for home oxygen. Oxygen was applied at 2 lpm via nasal cannula to maintain a SpO2 between 90-92% while at rest. Actual SpO2 was 92%.

## 2025-04-03 NOTE — PLAN OF CARE
Problem: Chronic Conditions and Co-morbidities  Goal: Patient's chronic conditions and co-morbidity symptoms are monitored and maintained or improved  4/2/2025 2253 by Elva Muñiz RN  Outcome: Progressing  Flowsheets (Taken 4/2/2025 1810)  Care Plan - Patient's Chronic Conditions and Co-Morbidity Symptoms are Monitored and Maintained or Improved: Monitor and assess patient's chronic conditions and comorbid symptoms for stability, deterioration, or improvement     Problem: Discharge Planning  Goal: Discharge to home or other facility with appropriate resources  4/2/2025 2253 by Elva Muñiz, RN  Outcome: Progressing  Flowsheets (Taken 4/2/2025 1810)  Discharge to home or other facility with appropriate resources:   Identify barriers to discharge with patient and caregiver   Arrange for needed discharge resources and transportation as appropriate   Identify discharge learning needs (meds, wound care, etc)   Refer to discharge planning if patient needs post-hospital services based on physician order or complex needs related to functional status, cognitive ability or social support system     Problem: Safety - Adult  Goal: Free from fall injury  4/2/2025 2253 by Elva Muñiz RN  Outcome: Progressing     Problem: ABCDS Injury Assessment  Goal: Absence of physical injury  4/2/2025 2253 by Elva Muñiz RN  Outcome: Progressing     Problem: Pain  Goal: Verbalizes/displays adequate comfort level or baseline comfort level  4/2/2025 2253 by Elva Muñiz, RN  Outcome: Progressing  Flowsheets (Taken 4/2/2025 1810)  Verbalizes/displays adequate comfort level or baseline comfort level:   Encourage patient to monitor pain and request assistance   Administer analgesics based on type and severity of pain and evaluate response   Assess pain using appropriate pain scale     Problem: Nutrition Deficit:  Goal: Optimize nutritional status  4/2/2025 2253 by Elva Muñiz, RN  Outcome: Progressing     Problem: Neurosensory  to baseline bowel function  4/2/2025 2253 by Elva Muñiz, RN  Outcome: Progressing  Flowsheets (Taken 4/2/2025 1810)  Maintains or returns to baseline bowel function:   Assess bowel function   Encourage oral fluids to ensure adequate hydration     Problem: Metabolic/Fluid and Electrolytes - Adult  Goal: Electrolytes maintained within normal limits  4/2/2025 2253 by Elva Muñiz, RN  Outcome: Progressing  Flowsheets (Taken 4/2/2025 1810)  Electrolytes maintained within normal limits:   Monitor labs and assess patient for signs and symptoms of electrolyte imbalances   Administer electrolyte replacement as ordered     Problem: Coping  Goal: Pt/Family able to verbalize concerns and demonstrate effective coping strategies  Description: INTERVENTIONS:  1. Assist patient/family to identify coping skills, available support systems and cultural and spiritual values  2. Provide emotional support, including active listening and acknowledgement of concerns of patient and caregivers  3. Reduce environmental stimuli, as able  4. Instruct patient/family in relaxation techniques, as appropriate  5. Assess for spiritual pain/suffering and initiate Spiritual Care, Psychosocial Clinical Specialist consults as needed  4/2/2025 2253 by Elva Muñiz, RN  Outcome: Progressing  Flowsheets (Taken 4/2/2025 1810)  Patient/family able to verbalize anxieties, fears, and concerns, and demonstrate effective coping: Assist patient/family to identify coping skills, available support systems and cultural and spiritual values

## 2025-04-03 NOTE — ACP (ADVANCE CARE PLANNING)
Advance Care Planning     Palliative Team Advance Care Planning (ACP) Conversation    Date of Conversation: 04/03/25    Individuals present for the conversation: Patient with decision making capacity and Daughter Brooklynn     ACP documents on file prior to discussion:  -Power of  for Healthcare  -Portable DNR        Healthcare Decision Maker:    Primary Decision Maker: Cindy Cabral - Child - [phone]    Secondary Decision Maker: eMdina Turpin - Child - [phone]     Conversation Summary:  confirms code status and DPOAHC    Resuscitation Status:   Code Status: DNR-CC     Documentation Completed:  -No new documents completed.    I spent 5 minutes with the patient and/or surrogate decision maker discussing the patient's wishes and goals.      Mesha Guzman RN

## 2025-04-03 NOTE — DISCHARGE SUMMARY
Discharge Summary    Radha Coburn  :  1933  MRN:  [id number]    Admit date:  3/31/2025      Discharge date: 4/3/2025     Admitting Physician:  Amandeep Ramos MD    Discharge Diagnoses:    Principal Problem:    Acute pneumonia  Active Problems:    Essential hypertension /  edema from amlodipine /   Hyponatremia from hydrochlorothiazide    Type 2 diabetes mellitus without complication, without long-term current use of insulin    Chronic diastolic CHF (congestive heart failure), NYHA class 2 (HCC)    Atrial fibrillation (HCC)    Global aphasia    History of stroke    Hypoxia    Constipation  Resolved Problems:    * No resolved hospital problems. *      Hospital Course:   Radha Coburn is a 89 y.o. female admitted with acute pneumonia.  She presented with constipation.  Patient's last bowel movement was 3 days prior to arrival.  She does have history of stroke and difficulty swallowing.  She has been drinking fluids well.  Family reports they been giving her stool softeners which included MiraLAX and enema without relief of her symptoms.  Daughter was concerned that maybe she was more distended.  Cough for 2-3 weeks.  During her evaluation she was found to be hypoxic at 89% and placed on 2 L of oxygen.  Patient's blood pressures had been dropping.  She was seen by her PCP and adjusted and that it had improved initially.  Patient is nonverbal due to history of stroke.  CT abdomen and pelvis showed no acute findings however moderate to large amount of stool in the colon.  Chest x-ray was concerning for pneumonia.  She had mild leukocytosis of 13.5.  During patient's admission labs were monitored electrolytes replaced.  Viral panel was negative as well as blood cultures.  She was placed on IV antibiotics IV Solu-Medrol and nebulizer treatments.  Patient has tolerated this treatment well.  Constipation is improved after milk of molasses enemas and was placed on MiraLAX daily.  Plan will be to discharge today  spine 2017 01/31/2018    Essential hypertension /  edema from amlodipine /   Hyponatremia from hydrochlorothiazide         Discharge Medications:         Medication List        START taking these medications      levoFLOXacin 750 MG tablet  Commonly known as: LEVAQUIN  Take 1 tablet by mouth daily for 7 days     polyethylene glycol 17 g packet  Commonly known as: GLYCOLAX  Take 1 packet by mouth daily  Start taking on: April 4, 2025            CHANGE how you take these medications      * albuterol sulfate  (90 Base) MCG/ACT inhaler  Commonly known as: Ventolin HFA  Inhale 2 puffs into the lungs daily as needed for Wheezing or Shortness of Breath (SOB/WHEEZING)  What changed: Another medication with the same name was added. Make sure you understand how and when to take each.     * albuterol (2.5 MG/3ML) 0.083% nebulizer solution  Commonly known as: PROVENTIL  Take 3 mLs by nebulization every 4 hours as needed for Wheezing or Shortness of Breath  What changed: You were already taking a medication with the same name, and this prescription was added. Make sure you understand how and when to take each.     aspirin EC 81 MG EC tablet  Take 1 tablet by mouth daily  What changed: when to take this     carvedilol 3.125 MG tablet  Commonly known as: COREG  Take 1 tablet by mouth 2 times daily (with meals)  What changed:   medication strength  how much to take  when to take this           * This list has 2 medication(s) that are the same as other medications prescribed for you. Read the directions carefully, and ask your doctor or other care provider to review them with you.                CONTINUE taking these medications      ALPRAZolam 0.25 MG tablet  Commonly known as: XANAX     cloNIDine 0.3 MG/24HR Ptwk  Commonly known as: Catapres-TTS-3  Place 1 patch onto the skin every 7 days     furosemide 40 MG tablet  Commonly known as: Lasix  Take 1 tablet by mouth daily     Handicap Placard Misc  by Does not apply route

## 2025-04-03 NOTE — PROGRESS NOTES
Patient is leaving the floor at this time by wheelchair, patient's daughter is driving her home. Patient discharged.

## 2025-04-03 NOTE — PLAN OF CARE
Problem: Chronic Conditions and Co-morbidities  Goal: Patient's chronic conditions and co-morbidity symptoms are monitored and maintained or improved  4/3/2025 1245 by Negar Veliz RN  Outcome: Completed  4/2/2025 2253 by Elva Muñiz RN  Outcome: Progressing  Flowsheets (Taken 4/2/2025 1810)  Care Plan - Patient's Chronic Conditions and Co-Morbidity Symptoms are Monitored and Maintained or Improved: Monitor and assess patient's chronic conditions and comorbid symptoms for stability, deterioration, or improvement     Problem: Discharge Planning  Goal: Discharge to home or other facility with appropriate resources  4/3/2025 1245 by Negar Veliz RN  Outcome: Completed  4/2/2025 2253 by Elva Muñiz RN  Outcome: Progressing  Flowsheets (Taken 4/2/2025 1810)  Discharge to home or other facility with appropriate resources:   Identify barriers to discharge with patient and caregiver   Arrange for needed discharge resources and transportation as appropriate   Identify discharge learning needs (meds, wound care, etc)   Refer to discharge planning if patient needs post-hospital services based on physician order or complex needs related to functional status, cognitive ability or social support system     Problem: Safety - Adult  Goal: Free from fall injury  4/3/2025 1245 by Negar Veliz RN  Outcome: Completed  Flowsheets (Taken 4/3/2025 0047 by Elva Muñiz RN)  Free From Fall Injury: Instruct family/caregiver on patient safety  4/2/2025 2253 by Elva Muñiz RN  Outcome: Progressing     Problem: ABCDS Injury Assessment  Goal: Absence of physical injury  4/3/2025 1245 by Negar Veliz RN  Outcome: Completed  4/2/2025 2253 by Elva Muñiz RN  Outcome: Progressing     Problem: Pain  Goal: Verbalizes/displays adequate comfort level or baseline comfort level  4/3/2025 1245 by Negar Veliz RN  Outcome: Completed  4/2/2025 2253 by Elva Muñiz RN  Outcome: Progressing  Flowsheets (Taken 4/2/2025

## 2025-04-03 NOTE — PROGRESS NOTES
Writer spoke with patient's daughter, Cindy. Update given. Daughter aware we are waiting for home O2 to arrive. Care ongoing.

## 2025-04-03 NOTE — CARE COORDINATION
Notified [facility] care by [facility] that the patient is going home today. TONA Saenz

## 2025-04-03 NOTE — PROGRESS NOTES
[facility]  INPATIENT SPEECH THERAPY  [facility] MED SURG  Dysphagia Treatment      Date: 4/3/2025  Patient Name: Radha Coburn      CSN: [id number]   : 1933  (89 y.o.)  Gender: female   Referring Physician:  Amandeep Ramos MD  Diagnosis: R13.12 Oropharyngeal Dysphagia  Precautions: Fall risk, aspiration risk  Current Diet: Minced and Moist  Respiratory Status: Nasal Canula: 2L of oxygen  Swallowing Strategies: Standard Universal Swallow Precautions  Date of Last MBS/FEES: Not Applicable    Pain:  No pain reported.    Subjective:  Patient seen sitting upright with breakfast meal for dysphagia treatment this date. Patient is alert and appeared to be oriented, however orientation questions unable to be answered due to aphasia. Patient reported no pain at this time.      Short-Term Goals:  Short-Term Goal Timeframe: 7 Days    SHORT TERM GOAL #1:  Goal 1: Patient will complete oropharyngeal swallowing exercises 10-15x each given minimal verbal cues.  INTERVENTIONS: Did not target exercises this date due to treatment focus on advanced solid trials and diet tolerance monitoring during meal.     SHORT TERM GOAL #2:  Goal 2: Patient will consume advanced trials of soft and bite sized solids with adequate bolus prepataration/propulsion without overt s/sx of aspiration/penetration in 70% of opportunities.  INTERVENTIONS: Patient was able to consume x5 trials of soft and bite sized solids without overt s/sx of aspiration/penetration. No significant oral residues observed post swallow this date. No overt s/sx of aspiration or penetration with all consistencies trialed (puree, thin liquids, minced and moist) this date.     SHORT TERM GOAL #3:  Goal 3: Patient will utilize compensatory swallowing strategies (small bites/sips, pacing/slow rate, alternate bites/sips, etc) with 80% accuracy during a meal or snack.  INTERVENTIONS: Patient did not independently utilize small sips with thin liquids or slow rate with meal

## 2025-04-03 NOTE — PROGRESS NOTES
Vitals and assessment complete at this time. See flowsheets for details. Patient is resting in bed. Patient is A&Ox4. Vitals are WNL. SPO2 is 90% on room air. Home O2 eval to be done today. Breathing is regular and unlabored. Minimal expiratory wheezes noted. Patient denies further needs at this time. Call light is within reach. Care ongoing.

## 2025-04-03 NOTE — PROGRESS NOTES
Oxygen and nebulizer delivered.  Reviewed discharge instructions with patient's daughter Cindy.   Aware of need to  new prescriptions.  Reviewed new medications and side effects to monitor for.   Reviewed home medications and when next dose is due.  Aware of date/time of follow up appointment.  Instructed to follow a regular diet.   Educated on importance of continuing to use Acapella frequently.  Device sent home with patient.  Aware to wear oxygen @ 2 lpm via nasal cannula.  Educational handouts given on Constipation, Pneumonia, Oxygen Therapy and Stroke.   Questions answered.   Verbalizes understanding.  Copy of discharge instructions given to patient.

## 2025-04-03 NOTE — CARE COORDINATION
Required documentation for portable oxygen and nebulizer faxed to [facility] for discharge today. Will follow up with [facility] for delivery today.

## 2025-04-03 NOTE — PROGRESS NOTES
Writer notified patient's daughter that home O2 is here, patient's daughter will be here shortly.

## 2025-04-03 NOTE — PROGRESS NOTES
Progress Note    SUBJECTIVE:    Patient seen for f/u of Acute pneumonia.  She sitting up in chair  on 2L of oxygen. No distress. Feels better    ROS:   Constitutional: negative  for fevers, and negative for chills.  Respiratory: negative for shortness of breath, positive for cough, and negative for wheezing  Cardiovascular: negative for chest pain, and negative for palpitations  Gastrointestinal: negative for abdominal pain, negative for nausea,negative for vomiting, negative for diarrhea, and negative for constipation     All other systems were reviewed with the patient and are negative unless otherwise stated in HPI      OBJECTIVE:      Vitals:   Vitals:    04/03/25 0620   BP: 126/83   Pulse: 84   Resp: 18   Temp: 97 °F (36.1 °C)   SpO2: 90%     Weight - Scale: 79 kg (174 lb 1.6 oz)   Height: 170.2 cm (5' 7\")     Weight  Wt Readings from Last 3 Encounters:   04/03/25 79 kg (174 lb 1.6 oz)   03/19/25 72.3 kg (159 lb 8 oz)   03/01/25 78.9 kg (174 lb)     Body mass index is 27.27 kg/m².    24HR INTAKE/OUTPUT:      Intake/Output Summary (Last 24 hours) at 4/3/2025 0625  Last data filed at 4/3/2025 0421  Gross per 24 hour   Intake 2371 ml   Output --   Net 2371 ml     -----------------------------------------------------------------  Exam:    GEN:    Awake, alert but aphasic .   EYES:  EOMI, pupils equal   NECK: Supple. No lymphadenopathy.  No carotid bruit  CVS:    regular rate and rhythm, no audible murmur  PULM:   few scattered end exp wheeze with scattered rhonchi , no acute respiratory distress  ABD:    Bowels sounds normal.  Abdomen is soft.  No distention.  no tenderness to palpation.   EXT:   no edema bilaterally .  No calf tenderness.   NEURO: Moves all extremities.  Motor and sensory are grossly intact  SKIN:  No rashes.  No skin lesions.    -----------------------------------------------------------------    Diagnostic Data:      Complete Blood Count:   Recent Labs     04/01/25 0805 04/02/25  0635

## 2025-04-03 NOTE — DISCHARGE INSTR - DIET
Good nutrition is important when healing from an illness, injury, or surgery.  Follow any nutrition recommendations given to you during your hospital stay.   If you were given an oral nutrition supplement while in the hospital, continue to take this supplement at home.  You can take it with meals, in-between meals, and/or before bedtime. These supplements can be purchased at most local grocery stores, pharmacies, and chain [employer]-stores.   If you have any questions about your diet or nutrition, call the hospital and ask for the dietitian.  Regular diet

## 2025-04-03 NOTE — PROGRESS NOTES
Contact guard assistance;Stand by assistance  Gait  Gait Training: Yes  Overall Level of Assistance: Contact guard assistance;Stand by assistance  Distance (ft): 80 Feet  Assistive Device: Walker, rolling;Gait belt  Interventions: Demonstration;Verbal cues  Base of Support: Narrowed  Speed/Salima: Shuffled  Step Length: Left shortened;Right shortened  PT Exercises  Exercise Treatment: Seated B LE exercises x15 reps in all available planes  Safety Devices  Type of Devices: All fall risk precautions in place;Call light within reach;Left in chair;Chair alarm in place      Goals  Short Term Goals  Time Frame for Short Term Goals: 10 days.  Short Term Goal 1: Patient will ambulate 50' with FWW, supervision, without LOB  Short Term Goal 2: Patient will peform transfers and bed mobility tasks with MI  Short Term Goal 3: Patient will tolerate 20-30 minutes of therex/act to improve endurance for ADLs.  Patient Goals   Patient Goals : Unable to state goals.    Education  Patient Education  Education Given To: Patient  Education Provided: Plan of Care;Role of Therapy  Education Method: Verbal  Barriers to Learning: None  Education Outcome: Demonstrated understanding    Therapy Time   Individual Concurrent Group Co-treatment   Time In 1140         Time Out 1204         Minutes 24           Yarelis Eddy, MARIA EUGENIA

## 2025-04-03 NOTE — PROGRESS NOTES
Writer at bedside to complete evening assessment. Upon entry to room, pt in chair daughter visiting, respirations unlabored while on 2L NC. Vitals obtained and assessment completed, see flow sheet for details. Pt denies needs from writer at this time. Call light in reach. Care is ongoing.

## 2025-04-04 ENCOUNTER — CARE COORDINATION (OUTPATIENT)
Dept: CARE COORDINATION | Age: 89
End: 2025-04-04

## 2025-04-04 DIAGNOSIS — J18.9 ACUTE PNEUMONIA: Primary | ICD-10-CM

## 2025-04-04 PROCEDURE — 1111F DSCHRG MED/CURRENT MED MERGE: CPT | Performed by: INTERNAL MEDICINE

## 2025-04-04 NOTE — CARE COORDINATION
Care Transitions Note    Initial Call - Call within 2 business days of discharge: Yes    Patient Current Location:  Home: [address] [place] [address] [address]  [facility] [id number]    Care Transition Nurse contacted the  daughterCindy  by telephone to perform post hospital discharge assessment, verified name and  as identifiers.  Provided introduction to self, and explanation of the Care Transition Nurse role.    Patient: Radha Coburn      Patient : 1933   MRN: [id number]      Reason for Admission: acute PNE, constipation  Discharge Date: 4/3/25    RURS: Readmission Risk Score: 22.5    3/31-4/3 [place] admission for acute PNE, constipation.  Treated with IV steroids, IV antibiotics + nebulizer treatments.  Discharged home with daughter and [facility].  New glycolax + 7 day course of Levaquin + new home oxygen 2 liters.    Last Discharge Facility       Date Complaint Diagnosis Description Type Department Provider    3/31/25 Constipation Pneumonia due to infectious organism, unspecified laterality, unspecified part of lung ... ED [facility] Amandeep Ramos MD; [place], ...            Was this an external facility discharge? No    Additional needs identified to be addressed with provider   Has appt with PCP for              Method of communication with provider: none.    Patients top risk factors for readmission: medical condition-acute PNE, constipation.    Interventions to address risk factors:   Review of patient management of conditions/medications: reviewed  Home Health: initial visit today.  and will have f/u visit on    Appts    Care Summary Note:   3/31-4/3 [place] admission for acute PNE, constipation.  Treated with IV steroids, IV antibiotics + nebulizer treatments.  Discharged home with daughter and [facility].  New glycolax + 7 day course of Levaquin + new home oxygen 2 liters.    I spoke with daughterCindy who is living with patient.  Reports that [facility] is

## 2025-04-07 NOTE — PROGRESS NOTES
Physician Progress Note      PATIENT:               JANET FINK  CSN #:                  [id number]  :                       1933  ADMIT DATE:       3/31/2025 9:37 PM  DISCH DATE:        4/3/2025 2:30 PM  RESPONDING  PROVIDER #:        ISA Viera CNP          QUERY TEXT:    Acute pneumonia is documented in the medical record Isa Maria APRN - CNP on 2025.  Please clarify any associated diagnoses:    The clinical indicators include:  89 yr old,  Pneumonia, DM, HTN,    Internal Medicine PN on  Acute pneumonia  ED note on  Pneumonia due to infectious organism, unspecified laterality,   Sepsis Time Identified: 0007   WBC 13.5, , 100   WBC 13.8 , 94   , 105,    IV Rocephin and Zithromax, Blood culture, IV bolus, Blood culture, Lactic   acid,    Thank you,  Nghia Duarte, [facility] CDS  Options provided:  -- Sepsis, present on admission  -- Acute pneumonia without sepsis  -- Other - I will add my own diagnosis  -- Disagree - Not applicable / Not valid  -- Disagree - Clinically unable to determine / Unknown  -- Refer to Clinical Documentation Reviewer    PROVIDER RESPONSE TEXT:    This patient has sepsis which was present on admission.    Query created by: Nghia Duarte on 2025 12:48 AM      Electronically signed by:  ISA Viear CNP 2025 4:33 PM

## 2025-04-10 ENCOUNTER — CARE COORDINATION (OUTPATIENT)
Dept: CARE COORDINATION | Age: 89
End: 2025-04-10

## 2025-04-10 NOTE — CARE COORDINATION
MD Jah Internal Medicine [phone]    4/21/2025 2:30 PM Amandeep Ramos MD Internal Medicine [phone]            N Care Coordinator provided contact information.  Plan for follow-up call in 6-10 days based on severity of symptoms and risk factors.  Plan for next call: symptom management-any s/s of pneumonia? Still coughing? Any constipation?  follow-up appointment-review notes from PCP follow up.       Medina Camacho LPN

## 2025-04-14 PROBLEM — I63.512 ACUTE ISCHEMIC LEFT MIDDLE CEREBRAL ARTERY (MCA) STROKE (HCC): Status: RESOLVED | Noted: 2025-01-30 | Resolved: 2025-04-14

## 2025-04-14 PROBLEM — J15.9 COMMUNITY ACQUIRED BACTERIAL PNEUMONIA: Status: ACTIVE | Noted: 2025-04-01

## 2025-04-14 PROBLEM — I63.9 ISCHEMIC STROKE (HCC): Status: RESOLVED | Noted: 2025-01-30 | Resolved: 2025-04-14

## 2025-04-14 PROBLEM — I63.512 ACUTE CEREBROVASCULAR ACCIDENT (CVA) DUE TO OCCLUSION OF LEFT MIDDLE CEREBRAL ARTERY (HCC): Status: RESOLVED | Noted: 2025-01-30 | Resolved: 2025-04-14

## 2025-04-14 PROBLEM — I63.512 ACUTE ISCHEMIC LEFT MIDDLE CEREBRAL ARTERY (MCA) STROKE (HCC): Status: RESOLVED | Noted: 2025-01-31 | Resolved: 2025-04-14

## 2025-04-14 PROBLEM — I63.9 ISCHEMIC STROKE (HCC): Status: RESOLVED | Noted: 2025-01-31 | Resolved: 2025-04-14

## 2025-04-14 PROBLEM — I63.9 CEREBROVASCULAR ACCIDENT (CVA) (HCC): Status: RESOLVED | Noted: 2025-01-30 | Resolved: 2025-04-14

## 2025-04-17 ENCOUNTER — CARE COORDINATION (OUTPATIENT)
Dept: CARE COORDINATION | Age: 89
End: 2025-04-17

## 2025-04-24 ENCOUNTER — CARE COORDINATION (OUTPATIENT)
Dept: CARE COORDINATION | Age: 89
End: 2025-04-24

## 2025-04-24 NOTE — CARE COORDINATION
Care Transitions Note    Follow Up Call     Attempted to reach patient, family, daughter  for transitions of care follow up.  Unable to reach patient, family,   .      Outreach Attempts:   Multiple attempts to contact patient, family,   at phone numbers on file.   HIPAA compliant voicemail left for patient, family, daughter. .     Care Summary Note: 2nd attempt-will end care transitions if no return call received.     Follow Up Appointment:   Future Appointments         Provider Specialty Dept Phone    5/30/2025 1:50 PM Amandeep Ramos MD Internal Medicine [phone]            No further follow-up call indicated based on severity of symptoms and risk factors.     Medina Camacho LPN

## 2025-04-29 ENCOUNTER — APPOINTMENT (OUTPATIENT)
Dept: GENERAL RADIOLOGY | Age: 89
DRG: 189 | End: 2025-04-29
Payer: MEDICARE

## 2025-04-29 ENCOUNTER — APPOINTMENT (OUTPATIENT)
Dept: CT IMAGING | Age: 89
DRG: 189 | End: 2025-04-29
Payer: MEDICARE

## 2025-04-29 ENCOUNTER — HOSPITAL ENCOUNTER (INPATIENT)
Age: 89
LOS: 1 days | Discharge: HOME HEALTH | DRG: 189 | End: 2025-04-30
Admitting: INTERNAL MEDICINE
Payer: MEDICARE

## 2025-04-29 DIAGNOSIS — R06.00 DYSPNEA AND RESPIRATORY ABNORMALITIES: Primary | ICD-10-CM

## 2025-04-29 DIAGNOSIS — R06.89 DYSPNEA AND RESPIRATORY ABNORMALITIES: Primary | ICD-10-CM

## 2025-04-29 DIAGNOSIS — J96.21 ACUTE ON CHRONIC RESPIRATORY FAILURE WITH HYPOXIA (HCC): ICD-10-CM

## 2025-04-29 DIAGNOSIS — J12.3 HUMAN METAPNEUMOVIRUS (HMPV) PNEUMONIA: ICD-10-CM

## 2025-04-29 DIAGNOSIS — Z71.89 DNR (DO NOT RESUSCITATE) DISCUSSION: ICD-10-CM

## 2025-04-29 LAB
ALBUMIN SERPL-MCNC: 3.7 G/DL (ref 3.5–5.2)
ALBUMIN/GLOB SERPL: 1.2 {RATIO} (ref 1–2.5)
ALP SERPL-CCNC: 123 U/L (ref 35–104)
ALT SERPL-CCNC: 24 U/L (ref 10–35)
ANION GAP SERPL CALCULATED.3IONS-SCNC: 10 MMOL/L (ref 9–16)
ARTERIAL PATENCY WRIST A: ABNORMAL
AST SERPL-CCNC: 40 U/L (ref 10–35)
B PARAP IS1001 DNA NPH QL NAA+NON-PROBE: NOT DETECTED
B PERT DNA SPEC QL NAA+PROBE: NOT DETECTED
BASOPHILS # BLD: 0.03 K/UL (ref 0–0.2)
BASOPHILS NFR BLD: 0 % (ref 0–2)
BILIRUB SERPL-MCNC: 0.7 MG/DL (ref 0–1.2)
BNP SERPL-MCNC: 2091 PG/ML (ref 0–450)
BODY TEMPERATURE: 37
BUN SERPL-MCNC: 13 MG/DL (ref 8–23)
BUN/CREAT SERPL: 26 (ref 9–20)
C PNEUM DNA NPH QL NAA+NON-PROBE: NOT DETECTED
CALCIUM SERPL-MCNC: 8.9 MG/DL (ref 8.6–10.4)
CHLORIDE SERPL-SCNC: 93 MMOL/L (ref 98–107)
CO2 SERPL-SCNC: 28 MMOL/L (ref 20–31)
CREAT SERPL-MCNC: 0.5 MG/DL (ref 0.5–0.9)
EKG Q-T INTERVAL: 360 MS
EKG QRS DURATION: 94 MS
EKG QTC CALCULATION (BAZETT): 457 MS
EKG R AXIS: -45 DEGREES
EKG T AXIS: 65 DEGREES
EKG VENTRICULAR RATE: 97 BPM
EOSINOPHIL # BLD: 0.53 K/UL (ref 0–0.44)
EOSINOPHILS RELATIVE PERCENT: 7 % (ref 1–4)
ERYTHROCYTE [DISTWIDTH] IN BLOOD BY AUTOMATED COUNT: 13.6 % (ref 11.8–14.4)
FIO2 ON VENT: 28 %
FLUAV AG SPEC QL: NEGATIVE
FLUAV RNA NPH QL NAA+NON-PROBE: NOT DETECTED
FLUBV AG SPEC QL: NEGATIVE
FLUBV RNA NPH QL NAA+NON-PROBE: NOT DETECTED
GAS FLOW.O2 O2 DELIVERY SYS: ABNORMAL L/MIN
GFR, ESTIMATED: 88 ML/MIN/1.73M2
GLUCOSE SERPL-MCNC: 145 MG/DL (ref 74–99)
HADV DNA NPH QL NAA+NON-PROBE: NOT DETECTED
HCO3 VENOUS: 30.3 MMOL/L (ref 24–30)
HCOV 229E RNA NPH QL NAA+NON-PROBE: NOT DETECTED
HCOV HKU1 RNA NPH QL NAA+NON-PROBE: NOT DETECTED
HCOV NL63 RNA NPH QL NAA+NON-PROBE: NOT DETECTED
HCOV OC43 RNA NPH QL NAA+NON-PROBE: NOT DETECTED
HCT VFR BLD AUTO: 40.2 % (ref 36.3–47.1)
HGB BLD-MCNC: 13.2 G/DL (ref 11.9–15.1)
HMPV RNA NPH QL NAA+NON-PROBE: DETECTED
HPIV1 RNA NPH QL NAA+NON-PROBE: NOT DETECTED
HPIV2 RNA NPH QL NAA+NON-PROBE: NOT DETECTED
HPIV3 RNA NPH QL NAA+NON-PROBE: NOT DETECTED
HPIV4 RNA NPH QL NAA+NON-PROBE: NOT DETECTED
IMM GRANULOCYTES # BLD AUTO: <0.03 K/UL (ref 0–0.3)
IMM GRANULOCYTES NFR BLD: 0 %
LACTATE BLDV-SCNC: 1.4 MMOL/L (ref 0.5–2.2)
LYMPHOCYTES NFR BLD: 1.21 K/UL (ref 1.1–3.7)
LYMPHOCYTES RELATIVE PERCENT: 17 % (ref 24–43)
M PNEUMO DNA NPH QL NAA+NON-PROBE: NOT DETECTED
MAGNESIUM SERPL-MCNC: 1.9 MG/DL (ref 1.7–2.3)
MCH RBC QN AUTO: 30.5 PG (ref 25.2–33.5)
MCHC RBC AUTO-ENTMCNC: 32.8 G/DL (ref 28.4–34.8)
MCV RBC AUTO: 92.8 FL (ref 82.6–102.9)
MONOCYTES NFR BLD: 0.93 K/UL (ref 0.1–1.2)
MONOCYTES NFR BLD: 13 % (ref 3–12)
NEUTROPHILS NFR BLD: 63 % (ref 36–65)
NEUTS SEG NFR BLD: 4.5 K/UL (ref 1.5–8.1)
NRBC BLD-RTO: 0 PER 100 WBC
O2 SAT, VEN: 89.9 % (ref 60–85)
PCO2 VENOUS: 52.9 MM HG (ref 39–55)
PCO2, VEN, TEMP ADJ: 52.9 MMHG (ref 39–55)
PH VENOUS: 7.38 (ref 7.32–7.42)
PH, VEN, TEMP ADJ: 7.38 (ref 7.32–7.42)
PLATELET # BLD AUTO: 195 K/UL (ref 138–453)
PMV BLD AUTO: 9.6 FL (ref 8.1–13.5)
PO2 VENOUS: 59.5 MM HG (ref 30–50)
PO2, VEN, TEMP ADJ: 59.5 MMHG (ref 30–50)
POSITIVE BASE EXCESS, VEN: 3.8 MMOL/L (ref 0–2)
POTASSIUM SERPL-SCNC: 4.3 MMOL/L (ref 3.7–5.3)
PROT SERPL-MCNC: 6.7 G/DL (ref 6.6–8.7)
RBC # BLD AUTO: 4.33 M/UL (ref 3.95–5.11)
RSV RNA NPH QL NAA+NON-PROBE: NOT DETECTED
RV+EV RNA NPH QL NAA+NON-PROBE: NOT DETECTED
SARS-COV-2 RDRP RESP QL NAA+PROBE: NOT DETECTED
SARS-COV-2 RNA NPH QL NAA+NON-PROBE: NOT DETECTED
SODIUM SERPL-SCNC: 131 MMOL/L (ref 136–145)
SPECIMEN DESCRIPTION: ABNORMAL
SPECIMEN DESCRIPTION: NORMAL
TROPONIN I SERPL HS-MCNC: 15 NG/L (ref 0–14)
TROPONIN I SERPL HS-MCNC: 15 NG/L (ref 0–14)
WBC OTHER # BLD: 7.2 K/UL (ref 3.5–11.3)

## 2025-04-29 PROCEDURE — 6370000000 HC RX 637 (ALT 250 FOR IP): Performed by: NURSE PRACTITIONER

## 2025-04-29 PROCEDURE — 87804 INFLUENZA ASSAY W/OPTIC: CPT

## 2025-04-29 PROCEDURE — 6370000000 HC RX 637 (ALT 250 FOR IP)

## 2025-04-29 PROCEDURE — 94640 AIRWAY INHALATION TREATMENT: CPT

## 2025-04-29 PROCEDURE — 84484 ASSAY OF TROPONIN QUANT: CPT

## 2025-04-29 PROCEDURE — 85025 COMPLETE CBC W/AUTO DIFF WBC: CPT

## 2025-04-29 PROCEDURE — 82805 BLOOD GASES W/O2 SATURATION: CPT

## 2025-04-29 PROCEDURE — 94669 MECHANICAL CHEST WALL OSCILL: CPT

## 2025-04-29 PROCEDURE — 87040 BLOOD CULTURE FOR BACTERIA: CPT

## 2025-04-29 PROCEDURE — 36415 COLL VENOUS BLD VENIPUNCTURE: CPT

## 2025-04-29 PROCEDURE — 1200000000 HC SEMI PRIVATE

## 2025-04-29 PROCEDURE — 6360000002 HC RX W HCPCS

## 2025-04-29 PROCEDURE — 83880 ASSAY OF NATRIURETIC PEPTIDE: CPT

## 2025-04-29 PROCEDURE — 97166 OT EVAL MOD COMPLEX 45 MIN: CPT

## 2025-04-29 PROCEDURE — 87635 SARS-COV-2 COVID-19 AMP PRB: CPT

## 2025-04-29 PROCEDURE — 6360000004 HC RX CONTRAST MEDICATION

## 2025-04-29 PROCEDURE — 97116 GAIT TRAINING THERAPY: CPT

## 2025-04-29 PROCEDURE — 0202U NFCT DS 22 TRGT SARS-COV-2: CPT

## 2025-04-29 PROCEDURE — 93005 ELECTROCARDIOGRAM TRACING: CPT

## 2025-04-29 PROCEDURE — 71045 X-RAY EXAM CHEST 1 VIEW: CPT

## 2025-04-29 PROCEDURE — 96374 THER/PROPH/DIAG INJ IV PUSH: CPT

## 2025-04-29 PROCEDURE — 83735 ASSAY OF MAGNESIUM: CPT

## 2025-04-29 PROCEDURE — 99285 EMERGENCY DEPT VISIT HI MDM: CPT

## 2025-04-29 PROCEDURE — 5A0935A ASSISTANCE WITH RESPIRATORY VENTILATION, LESS THAN 24 CONSECUTIVE HOURS, HIGH NASAL FLOW/VELOCITY: ICD-10-PCS | Performed by: INTERNAL MEDICINE

## 2025-04-29 PROCEDURE — 94664 DEMO&/EVAL PT USE INHALER: CPT

## 2025-04-29 PROCEDURE — 2500000003 HC RX 250 WO HCPCS: Performed by: NURSE PRACTITIONER

## 2025-04-29 PROCEDURE — 93010 ELECTROCARDIOGRAM REPORT: CPT | Performed by: INTERNAL MEDICINE

## 2025-04-29 PROCEDURE — 6360000002 HC RX W HCPCS: Performed by: NURSE PRACTITIONER

## 2025-04-29 PROCEDURE — 97162 PT EVAL MOD COMPLEX 30 MIN: CPT

## 2025-04-29 PROCEDURE — 80053 COMPREHEN METABOLIC PANEL: CPT

## 2025-04-29 PROCEDURE — 83605 ASSAY OF LACTIC ACID: CPT

## 2025-04-29 PROCEDURE — 2700000000 HC OXYGEN THERAPY PER DAY

## 2025-04-29 PROCEDURE — 94761 N-INVAS EAR/PLS OXIMETRY MLT: CPT

## 2025-04-29 PROCEDURE — 2500000003 HC RX 250 WO HCPCS

## 2025-04-29 PROCEDURE — 71260 CT THORAX DX C+: CPT

## 2025-04-29 RX ORDER — ONDANSETRON 2 MG/ML
4 INJECTION INTRAMUSCULAR; INTRAVENOUS EVERY 6 HOURS PRN
Status: DISCONTINUED | OUTPATIENT
Start: 2025-04-29 | End: 2025-04-30 | Stop reason: HOSPADM

## 2025-04-29 RX ORDER — POTASSIUM CHLORIDE 7.45 MG/ML
10 INJECTION INTRAVENOUS PRN
Status: DISCONTINUED | OUTPATIENT
Start: 2025-04-29 | End: 2025-04-30 | Stop reason: HOSPADM

## 2025-04-29 RX ORDER — ONDANSETRON 4 MG/1
4 TABLET, ORALLY DISINTEGRATING ORAL 3 TIMES DAILY PRN
Status: DISCONTINUED | OUTPATIENT
Start: 2025-04-29 | End: 2025-04-29 | Stop reason: SDUPTHER

## 2025-04-29 RX ORDER — IPRATROPIUM BROMIDE AND ALBUTEROL SULFATE 2.5; .5 MG/3ML; MG/3ML
SOLUTION RESPIRATORY (INHALATION)
Status: DISCONTINUED
Start: 2025-04-29 | End: 2025-04-29 | Stop reason: WASHOUT

## 2025-04-29 RX ORDER — ONDANSETRON 4 MG/1
4 TABLET, ORALLY DISINTEGRATING ORAL EVERY 8 HOURS PRN
Status: DISCONTINUED | OUTPATIENT
Start: 2025-04-29 | End: 2025-04-30 | Stop reason: HOSPADM

## 2025-04-29 RX ORDER — IOPAMIDOL 755 MG/ML
75 INJECTION, SOLUTION INTRAVASCULAR
Status: COMPLETED | OUTPATIENT
Start: 2025-04-29 | End: 2025-04-29

## 2025-04-29 RX ORDER — LIDOCAINE 4 G/G
1 PATCH TOPICAL DAILY
Status: DISCONTINUED | OUTPATIENT
Start: 2025-04-29 | End: 2025-04-30 | Stop reason: HOSPADM

## 2025-04-29 RX ORDER — CARVEDILOL 3.12 MG/1
3.12 TABLET ORAL 2 TIMES DAILY WITH MEALS
Status: DISCONTINUED | OUTPATIENT
Start: 2025-04-29 | End: 2025-04-30 | Stop reason: HOSPADM

## 2025-04-29 RX ORDER — ALBUTEROL SULFATE 0.83 MG/ML
2.5 SOLUTION RESPIRATORY (INHALATION) EVERY 4 HOURS PRN
Status: DISCONTINUED | OUTPATIENT
Start: 2025-04-29 | End: 2025-04-30 | Stop reason: HOSPADM

## 2025-04-29 RX ORDER — ALBUTEROL SULFATE 0.83 MG/ML
2.5 SOLUTION RESPIRATORY (INHALATION) CONTINUOUS
Status: ACTIVE | OUTPATIENT
Start: 2025-04-29 | End: 2025-04-29

## 2025-04-29 RX ORDER — IPRATROPIUM BROMIDE AND ALBUTEROL SULFATE 2.5; .5 MG/3ML; MG/3ML
SOLUTION RESPIRATORY (INHALATION)
Status: DISPENSED
Start: 2025-04-29 | End: 2025-04-29

## 2025-04-29 RX ORDER — GUAIFENESIN 200 MG/10ML
400 LIQUID ORAL 2 TIMES DAILY
Status: DISCONTINUED | OUTPATIENT
Start: 2025-04-29 | End: 2025-04-30 | Stop reason: HOSPADM

## 2025-04-29 RX ORDER — CLONIDINE 0.1 MG/24H
1 PATCH, EXTENDED RELEASE TRANSDERMAL WEEKLY
Status: DISCONTINUED | OUTPATIENT
Start: 2025-05-04 | End: 2025-04-30 | Stop reason: HOSPADM

## 2025-04-29 RX ORDER — ACETAMINOPHEN 325 MG/1
650 TABLET ORAL EVERY 6 HOURS PRN
Status: DISCONTINUED | OUTPATIENT
Start: 2025-04-29 | End: 2025-04-30 | Stop reason: HOSPADM

## 2025-04-29 RX ORDER — POLYETHYLENE GLYCOL 3350 17 G/17G
17 POWDER, FOR SOLUTION ORAL DAILY PRN
Status: DISCONTINUED | OUTPATIENT
Start: 2025-04-29 | End: 2025-04-30 | Stop reason: HOSPADM

## 2025-04-29 RX ORDER — ASPIRIN 81 MG/1
81 TABLET ORAL DAILY
Status: DISCONTINUED | OUTPATIENT
Start: 2025-04-29 | End: 2025-04-29

## 2025-04-29 RX ORDER — IPRATROPIUM BROMIDE AND ALBUTEROL SULFATE 2.5; .5 MG/3ML; MG/3ML
1 SOLUTION RESPIRATORY (INHALATION)
Status: DISCONTINUED | OUTPATIENT
Start: 2025-04-29 | End: 2025-04-30 | Stop reason: HOSPADM

## 2025-04-29 RX ORDER — ENOXAPARIN SODIUM 100 MG/ML
40 INJECTION SUBCUTANEOUS DAILY
Status: DISCONTINUED | OUTPATIENT
Start: 2025-04-29 | End: 2025-04-30 | Stop reason: HOSPADM

## 2025-04-29 RX ORDER — SODIUM CHLORIDE 9 MG/ML
INJECTION, SOLUTION INTRAVENOUS PRN
Status: DISCONTINUED | OUTPATIENT
Start: 2025-04-29 | End: 2025-04-30 | Stop reason: HOSPADM

## 2025-04-29 RX ORDER — MIRTAZAPINE 15 MG/1
7.5 TABLET, FILM COATED ORAL NIGHTLY
Status: DISCONTINUED | OUTPATIENT
Start: 2025-04-29 | End: 2025-04-30 | Stop reason: HOSPADM

## 2025-04-29 RX ORDER — FAMOTIDINE 20 MG/1
20 TABLET, FILM COATED ORAL 2 TIMES DAILY
Status: DISCONTINUED | OUTPATIENT
Start: 2025-04-29 | End: 2025-04-29

## 2025-04-29 RX ORDER — FUROSEMIDE 40 MG/1
40 TABLET ORAL DAILY
Status: DISCONTINUED | OUTPATIENT
Start: 2025-04-29 | End: 2025-04-30 | Stop reason: HOSPADM

## 2025-04-29 RX ORDER — SODIUM CHLORIDE 0.9 % (FLUSH) 0.9 %
10 SYRINGE (ML) INJECTION EVERY 12 HOURS SCHEDULED
Status: DISCONTINUED | OUTPATIENT
Start: 2025-04-29 | End: 2025-04-30 | Stop reason: HOSPADM

## 2025-04-29 RX ORDER — ACETAMINOPHEN 650 MG/1
650 SUPPOSITORY RECTAL EVERY 6 HOURS PRN
Status: DISCONTINUED | OUTPATIENT
Start: 2025-04-29 | End: 2025-04-30 | Stop reason: HOSPADM

## 2025-04-29 RX ORDER — ALPRAZOLAM 0.25 MG
0.25 TABLET ORAL DAILY PRN
Status: DISCONTINUED | OUTPATIENT
Start: 2025-04-29 | End: 2025-04-30 | Stop reason: HOSPADM

## 2025-04-29 RX ORDER — ROSUVASTATIN CALCIUM 40 MG/1
40 TABLET, COATED ORAL NIGHTLY
Status: DISCONTINUED | OUTPATIENT
Start: 2025-04-29 | End: 2025-04-30 | Stop reason: HOSPADM

## 2025-04-29 RX ORDER — ASPIRIN 81 MG/1
81 TABLET, CHEWABLE ORAL DAILY
Status: DISCONTINUED | OUTPATIENT
Start: 2025-04-29 | End: 2025-04-30 | Stop reason: HOSPADM

## 2025-04-29 RX ORDER — POTASSIUM CHLORIDE 1500 MG/1
40 TABLET, EXTENDED RELEASE ORAL PRN
Status: DISCONTINUED | OUTPATIENT
Start: 2025-04-29 | End: 2025-04-30 | Stop reason: HOSPADM

## 2025-04-29 RX ORDER — PANTOPRAZOLE SODIUM 40 MG/1
40 TABLET, DELAYED RELEASE ORAL
Status: DISCONTINUED | OUTPATIENT
Start: 2025-04-29 | End: 2025-04-29

## 2025-04-29 RX ORDER — CLONIDINE 0.3 MG/24H
1 PATCH, EXTENDED RELEASE TRANSDERMAL
Status: DISCONTINUED | OUTPATIENT
Start: 2025-04-29 | End: 2025-04-29 | Stop reason: RX

## 2025-04-29 RX ORDER — MAGNESIUM SULFATE IN WATER 40 MG/ML
2000 INJECTION, SOLUTION INTRAVENOUS PRN
Status: DISCONTINUED | OUTPATIENT
Start: 2025-04-29 | End: 2025-04-30 | Stop reason: HOSPADM

## 2025-04-29 RX ORDER — CLONIDINE 0.2 MG/24H
1 PATCH, EXTENDED RELEASE TRANSDERMAL WEEKLY
Status: DISCONTINUED | OUTPATIENT
Start: 2025-05-04 | End: 2025-04-30 | Stop reason: HOSPADM

## 2025-04-29 RX ORDER — IPRATROPIUM BROMIDE AND ALBUTEROL SULFATE 2.5; .5 MG/3ML; MG/3ML
3 SOLUTION RESPIRATORY (INHALATION)
Status: COMPLETED | OUTPATIENT
Start: 2025-04-29 | End: 2025-04-29

## 2025-04-29 RX ORDER — SODIUM CHLORIDE 0.9 % (FLUSH) 0.9 %
10 SYRINGE (ML) INJECTION PRN
Status: DISCONTINUED | OUTPATIENT
Start: 2025-04-29 | End: 2025-04-30 | Stop reason: HOSPADM

## 2025-04-29 RX ORDER — POLYETHYLENE GLYCOL 3350 17 G/17G
17 POWDER, FOR SOLUTION ORAL DAILY
Status: DISCONTINUED | OUTPATIENT
Start: 2025-04-29 | End: 2025-04-30 | Stop reason: HOSPADM

## 2025-04-29 RX ORDER — ALBUTEROL SULFATE 90 UG/1
2 INHALANT RESPIRATORY (INHALATION) DAILY PRN
Status: DISCONTINUED | OUTPATIENT
Start: 2025-04-29 | End: 2025-04-29

## 2025-04-29 RX ADMIN — FUROSEMIDE 40 MG: 40 TABLET ORAL at 09:22

## 2025-04-29 RX ADMIN — WATER 125 MG: 1 INJECTION INTRAMUSCULAR; INTRAVENOUS; SUBCUTANEOUS at 04:32

## 2025-04-29 RX ADMIN — IPRATROPIUM BROMIDE AND ALBUTEROL SULFATE 1 DOSE: .5; 2.5 SOLUTION RESPIRATORY (INHALATION) at 21:25

## 2025-04-29 RX ADMIN — IPRATROPIUM BROMIDE AND ALBUTEROL SULFATE 3 DOSE: .5; 2.5 SOLUTION RESPIRATORY (INHALATION) at 03:41

## 2025-04-29 RX ADMIN — IOPAMIDOL 75 ML: 755 INJECTION, SOLUTION INTRAVENOUS at 04:48

## 2025-04-29 RX ADMIN — ASPIRIN 81 MG: 81 TABLET, CHEWABLE ORAL at 09:21

## 2025-04-29 RX ADMIN — MIRTAZAPINE 7.5 MG: 15 TABLET, FILM COATED ORAL at 21:30

## 2025-04-29 RX ADMIN — ROSUVASTATIN CALCIUM 40 MG: 40 TABLET, FILM COATED ORAL at 21:30

## 2025-04-29 RX ADMIN — SODIUM CHLORIDE, PRESERVATIVE FREE 10 ML: 5 INJECTION INTRAVENOUS at 21:32

## 2025-04-29 RX ADMIN — IPRATROPIUM BROMIDE AND ALBUTEROL SULFATE 1 DOSE: .5; 2.5 SOLUTION RESPIRATORY (INHALATION) at 15:25

## 2025-04-29 RX ADMIN — IPRATROPIUM BROMIDE AND ALBUTEROL SULFATE 1 DOSE: .5; 2.5 SOLUTION RESPIRATORY (INHALATION) at 11:10

## 2025-04-29 RX ADMIN — SODIUM CHLORIDE, PRESERVATIVE FREE 10 ML: 5 INJECTION INTRAVENOUS at 09:22

## 2025-04-29 RX ADMIN — CARVEDILOL 3.12 MG: 3.12 TABLET, FILM COATED ORAL at 09:22

## 2025-04-29 RX ADMIN — LANSOPRAZOLE 30 MG: 15 TABLET, ORALLY DISINTEGRATING, DELAYED RELEASE ORAL at 11:30

## 2025-04-29 RX ADMIN — WATER 60 MG: 1 INJECTION INTRAMUSCULAR; INTRAVENOUS; SUBCUTANEOUS at 16:41

## 2025-04-29 RX ADMIN — ENOXAPARIN SODIUM 40 MG: 100 INJECTION SUBCUTANEOUS at 09:22

## 2025-04-29 RX ADMIN — CARVEDILOL 3.12 MG: 3.12 TABLET, FILM COATED ORAL at 16:45

## 2025-04-29 RX ADMIN — GUAIFENESIN 400 MG: 300 SOLUTION ORAL at 21:31

## 2025-04-29 RX ADMIN — GUAIFENESIN 400 MG: 300 SOLUTION ORAL at 09:21

## 2025-04-29 ASSESSMENT — LIFESTYLE VARIABLES
HOW MANY STANDARD DRINKS CONTAINING ALCOHOL DO YOU HAVE ON A TYPICAL DAY: PATIENT DOES NOT DRINK
HOW OFTEN DO YOU HAVE A DRINK CONTAINING ALCOHOL: NEVER

## 2025-04-29 NOTE — RT PROTOCOL NOTE
RESPIRATORY ASSESSMENT PROTOCOL                                                                                              Patient Name: Radha Coburn Room#: 0325/0325-01 : 1933     Admitting diagnosis: Dyspnea and respiratory abnormalities [R06.00, R06.89]  DNR (do not resuscitate) discussion [Z71.89]  Acute on chronic respiratory failure with hypoxia (HCC) [J96.21]       Medical History:   Past Medical History:   Diagnosis Date    Allergic rhinitis     Anxiety     Arthritis     DVT (deep venous thrombosis) (HCC) 1970    rt leg       Essential hypertension     Hyperlipidemia     Hypertensive urgency 2023    Impaired fasting glucose 2010    Osteoarthritis of right hip / Total Right Arthroplasty 2014    Osteoarthritis of right knee / Right TKA 2014    PONV (postoperative nausea and vomiting)     Shingles 2009    left arm    Stroke (HCC)     Cerebral atherosclerosis MRI confirmed       PATIENT ASSESSMENT    LABORATORY DATA  Hematology:   Lab Results   Component Value Date/Time    WBC 7.2 2025 03:35 AM    RBC 4.33 2025 03:35 AM    HGB 13.2 2025 03:35 AM    HCT 40.2 2025 03:35 AM     2025 03:35 AM     Chemistry:  No results found for: \"PHART\", \"IYO4ZJS\", \"PO2ART\", \"A0LKVLZK\", \"ESI9TXK\", \"PBEA\", \"NBEA\"    VITALS  Pulse: 93   Respirations: 20  BP: (!) 152/97  SpO2: 96 % O2 Device: Heated high flow cannula  Temp: 99.2 °F (37.3 °C)    SKIN COLOR  [x] Normal  [] Pale  [] Dusky  [] Cyanotic    RESPIRATORY PATTERN  [] Normal  [x] Dyspnea  [] Cheyne-Singer  [] Kussmaul  [] Biots    AMBULATORY  [] Yes  [] No  [x] With Assistance      Patient Acuity 0 1 2 3 4 Score   Level of Consciousness (LOC) [x]  Alert & Oriented or Pt normal LOC []  Confused;follows directions []  Confused & uncooper-ative []  Obtunded []  Comatose 0   Respiratory Rate  (RR) []  Reg. rate & pattern. 12 - 20 bpm  []  Increased RR. Greater than 20 bpm   [x]  SOB w/

## 2025-04-29 NOTE — PLAN OF CARE
Problem: Chronic Conditions and Co-morbidities  Goal: Patient's chronic conditions and co-morbidity symptoms are monitored and maintained or improved  Outcome: Progressing     Problem: Discharge Planning  Goal: Discharge to home or other facility with appropriate resources  Outcome: Progressing     Problem: Safety - Adult  Goal: Free from fall injury  Outcome: Progressing     Problem: ABCDS Injury Assessment  Goal: Absence of physical injury  Outcome: Progressing     Problem: Skin/Tissue Integrity  Goal: Skin integrity remains intact  Description: 1.  Monitor for areas of redness and/or skin breakdown2.  Assess vascular access sites hourly3.  Every 4-6 hours minimum:  Change oxygen saturation probe site4.  Every 4-6 hours:  If on nasal continuous positive airway pressure, respiratory therapy assess nares and determine need for appliance change or resting period  Outcome: Progressing     Problem: Respiratory - Adult  Goal: Achieves optimal ventilation and oxygenation  Outcome: Progressing

## 2025-04-29 NOTE — CONSULTS
Palliative Care Inpatient    Patient: Radha Coburn  Room: 0325/0325-01    Reason For Consult   Goals of care evaluation  Distress management  Guidance and support  Facilitate communications  Assistance in coordinating care    Code Status: DNR-CCA      Impression: Radha Coburn is a 89 y.o. year old female  has a past medical history of Allergic rhinitis, Anxiety, Arthritis, DVT (deep venous thrombosis) ([facility]), Essential hypertension, Hyperlipidemia, Hypertensive urgency, Impaired fasting glucose, Osteoarthritis of right hip / Total Right Arthroplasty 2014, Osteoarthritis of right knee / Right TKA 2014, PONV (postoperative nausea and vomiting), Shingles, and Stroke ([facility])..  Currently hospitalized for the management of Hypoxia.  The Palliative Care Team is following to assist with code status, hospice discussion.    Vital Signs  BP (!) 106/59   Pulse 83   Temp 98.5 °F (36.9 °C) (Oral)   Resp 20   Ht 1.702 m (5' 7\")   Wt 75.6 kg (166 lb 11.2 oz)   SpO2 92%   BMI 26.11 kg/m²     Patient Active Problem List   Diagnosis    Essential hypertension /  edema from amlodipine /   Hyponatremia from hydrochlorothiazide    HLA B27 (HLA B27 positive) suspect ankylosing spondylitis / normal x-ray lumbar spine 2017    Right carpal tunnel syndrome    Sacroiliac joint dysfunction of right side    Piriformis syndrome of right side    Type 2 diabetes mellitus without complication, without long-term current use of insulin (HCC)    Right internal carotid artery aneurysm    Acute respiratory failure with hypoxia (HCC)    Chronic diastolic CHF (congestive heart failure), NYHA class 2 (HCC)    Hyponatremia    Atrial fibrillation (HCC)    Global aphasia    Atrial flutter (HCC)    Global aphasia    Hx of ischemic left MCA stroke    Community acquired bacterial pneumonia    Hypoxia    Constipation    Acute on chronic respiratory failure with hypoxia (HCC)       Palliative Interaction: Writer spoke with daughter Cindy via phone.

## 2025-04-29 NOTE — ACP (ADVANCE CARE PLANNING)
See note dated 4/3/2025. No change.    Evelin Crowley RN  [facility]   Palliative Care Nurse Coordinator  4/29/2025 3:46 PM

## 2025-04-29 NOTE — PROGRESS NOTES
Telephone voicemail message left for daughter/DPOAHC re:  Patient discharge planning.  Will await return call and assist with discharge planning as appropriate.    TONA Zhao  4/29/2025

## 2025-04-29 NOTE — PROGRESS NOTES
Physical Therapy  Facility/Department: [facility] MED SURG  Daily Treatment Note  NAME: Radha Coburn  : 1933  MRN: [id number]    Date of Service: 2025    Discharge Recommendations:  Continue to assess pending progress, Home with Home health PT     Patient Diagnosis(es): The primary encounter diagnosis was Dyspnea and respiratory abnormalities. A diagnosis of DNR (do not resuscitate) discussion was also pertinent to this visit.    Assessment  Assessment: Bed mobility: SBa. Transfers: CGA/SBA. Seated exercises B LE x15. Gait with WW 30ftx1 with slow kira and decreased step length.Commode use and transfer.  Activity Tolerance: Patient tolerated treatment well  Equipment Needed: No    Plan  Physical Therapy Plan  General Plan: 2 times a day 7 days a week  Current Treatment Recommendations: Strengthening;ROM;Balance training;Functional mobility training;Transfer training;ADL/Self-care training;IADL training;Endurance training;Gait training;Stair training;Neuromuscular re-education;Manual;Home exercise program;Safety education & training;Patient/Caregiver education & training;Therapeutic activities;Positioning    Restrictions  Restrictions/Precautions  Restrictions/Precautions: Fall Risk, General Precautions  Activity Level: Up with Assist  Required Braces or Orthoses?: No     Subjective   Subjective  Subjective: Pt. in restroom with call light on, willing to work with therapy at this time.  Pain: denies    Objective  Bed Mobility Training  Bed Mobility Training: Yes  Overall Level of Assistance: Stand by assistance  Interventions: Verbal cues  Rolling: Stand by assistance  Sit to Supine: Stand by assistance  Scooting: Stand by assistance  Transfer Training  Transfer Training: Yes  Overall Level of Assistance: Stand by assistance  Interventions: Verbal cues  Sit to Stand: Stand by assistance  Stand to Sit: Stand by assistance  Toilet Transfer: Stand by assistance  Gait  Gait Training: Yes  Overall Level of  Assistance: Contact guard assistance;Stand by assistance  Distance (ft): 30 Feet  Assistive Device: Walker, rolling;Gait belt  Interventions: Demonstration;Verbal cues  Base of Support: Narrowed  Speed/Salima: Shuffled  Step Length: Left shortened;Right shortened  Safety Devices  Type of Devices: All fall risk precautions in place;Bed alarm in place;Left in bed;Call light within reach;Nurse notified  Restraints  Restraints Initially in Place: No    Goals  Short Term Goals  Time Frame for Short Term Goals: 10 days  Short Term Goal 1: Patient will ambulate 75' with FWW, SBA, without LOB  Short Term Goal 2: Patient will perform bed mobility tasks and transfers with MI  Short Term Goal 3: Patient will tolerate 20-30 minutes of therex/act to improve endruance for ADLs.  Additional Goals?: No  Patient Goals   Patient Goals : No stated goals    Education  Patient Education  Education Given To: Patient  Education Provided: Role of Therapy;Plan of Care;Transfer Training  Education Method: Verbal  Barriers to Learning: None  Education Outcome: Verbalized understanding        Therapy Time   Individual Concurrent Group Co-treatment   Time In 1231         Time Out 1246         Minutes 15           Yarelis Eddy, PTA

## 2025-04-29 NOTE — H&P
History and Physical    Patient:  Radha Coburn  MRN: [id number]    Chief Complaint:  Shortness of breath    History Obtained From:  patient, electronic medical record    PCP: Amandeep Ramos MD    History of Present Illness:   The patient is a 89 y.o. female who presented to the ER with SOB.  Patient was recently hospitalized from 3/31/2025-4/3/2025 for PN and was discharged home on ATBs and follow up with PCP.  She was discharged home with 2L of oxygen at that time.  She reported that her SOB was progressing. She denied chest pain or palpitations.  She denied dizziness, syncope or falls.  She denied GI/ issues.  During her evaluation she was hypertensive, tachycardic, Tachypneic but afebrile. CT chest was negative for PE and showed no acute findings, but enlargement of the central pulmonary arteries suggesting pulmonary hypertension, diffuse reticular interstitial opacities in both lungs suggesting chronic interstitial lung disease, moderate mediastinal and hilar lymphadenopathy suggesting reactive lymphoid hyperplasia. EKG showed Atrial fibrillation with no change.  CMP unremarkable. Troponin 15 and 15.  BNP was 2091.  CBC unremarkable. ER provider reported significant work of breathing with wheezing and was placed on Heated HF.  Conversations took place between ER provider and family and plan was to admit to work on Hospice care for Discharge.     Past Medical History:        Diagnosis Date    Allergic rhinitis     Anxiety     Arthritis     DVT (deep venous thrombosis) ([facility]) 01/01/1970    rt leg       Essential hypertension     Human metapneumovirus (hMPV) pneumonia 04/29/2025    Hyperlipidemia     Hypertensive urgency 06/28/2023    Impaired fasting glucose 01/01/2010    Osteoarthritis of right hip / Total Right Arthroplasty 2014 02/17/2014    Osteoarthritis of right knee / Right TKA 2014 08/01/2014    PONV (postoperative nausea and vomiting)     Shingles 01/01/2009    left arm    Stroke ([facility])     Cerebral  x-ray lumbar spine 2017 01/31/2018    Essential hypertension /  edema from amlodipine /   Hyponatremia from hydrochlorothiazide        Plan:     MEDICAL DECISION MAKING:    Primary Problem(s): Acute on chronic respiratory failure with hypoxia (HCC)  Differential diagnoses: chronic respiratory failure, viral PN,   Condition is a chronic illness with exacerbation, progression or side effects of treatment  Condition is improving  Treatment plan:   Monitor labs and replace electroytes  RVP-HMV  Appreciate PC- Family now decided not to do Hospice until all family home in the middle of May.    Sugar  Up to chair   PT/OT  SS for DC planning  BC x 2-pending  Imaging: no further imaging studies ordered today  Medications:   Start Duoneb  Start Robitussin  Start Solumedrol  Medication Monitoring / High Risk Medications: none        Nutrition status:   Well developed, well nourished with no malnutrition  Dietician consult initiated    Hospital Prophylaxis:   DVT: Lovenox   Stress Ulcer: PPI     MDM Data:   Test interpretation:  My independent EKG interpretation: Atrial Fibrillaton  My independent X-ray interpretation:   reviewed  Management and/or test interpretation discussed with ER MD at time of admission  Consults and Nursing notes were personally reviewed, all current labs and imaging were personally reviewed, tests ordered: CBC, BMP, and history obtained by independent historian       Disposition:  Shared decision making: All test results, treatment options and disposition options were discussed with the patient today  Social determinants of health that may impact management: none  Code status: DNR-CCA   Disposition: Discharge plan is pending        Critical Care Time:  Total critical care time caring for this patient with life threatening, unstable organ failure, including direct patient contact, management of life support systems, review of data including imaging and labs, discussions with other team members and

## 2025-04-29 NOTE — ED PROVIDER NOTES
.NASOPHARYNGEAL SWAB     SARS-CoV-2, Rapid Not Detected Not Detected   Rapid influenza A/B antigens    Specimen: Nasopharyngeal   Result Value Ref Range    Flu A Antigen NEGATIVE NEGATIVE    Flu B Antigen NEGATIVE NEGATIVE   CBC with Auto Differential   Result Value Ref Range    WBC 7.2 3.5 - 11.3 k/uL    RBC 4.33 3.95 - 5.11 m/uL    Hemoglobin 13.2 11.9 - 15.1 g/dL    Hematocrit 40.2 36.3 - 47.1 %    MCV 92.8 82.6 - 102.9 fL    MCH 30.5 25.2 - 33.5 pg    MCHC 32.8 28.4 - 34.8 g/dL    RDW 13.6 11.8 - 14.4 %    Platelets 195 138 - 453 k/uL    MPV 9.6 8.1 - 13.5 fL    NRBC Automated 0.0 0.0 per 100 WBC    Neutrophils % 63 36 - 65 %    Lymphocytes % 17 (L) 24 - 43 %    Monocytes % 13 (H) 3 - 12 %    Eosinophils % 7 (H) 1 - 4 %    Basophils % 0 0 - 2 %    Immature Granulocytes % 0 0 %    Neutrophils Absolute 4.50 1.50 - 8.10 k/uL    Lymphocytes Absolute 1.21 1.10 - 3.70 k/uL    Monocytes Absolute 0.93 0.10 - 1.20 k/uL    Eosinophils Absolute 0.53 (H) 0.00 - 0.44 k/uL    Basophils Absolute 0.03 0.00 - 0.20 k/uL    Immature Granulocytes Absolute <0.03 0.00 - 0.30 k/uL   Comprehensive Metabolic Panel   Result Value Ref Range    Sodium 131 (L) 136 - 145 mmol/L    Potassium 4.3 3.7 - 5.3 mmol/L    Chloride 93 (L) 98 - 107 mmol/L    CO2 28 20 - 31 mmol/L    Anion Gap 10 9 - 16 mmol/L    Glucose 145 (H) 74 - 99 mg/dL    BUN 13 8 - 23 mg/dL    Creatinine 0.5 0.50 - 0.90 mg/dL    Est, Glom Filt Rate 88 >60 mL/min/1.73m2    BUN/Creatinine Ratio 26 (H) 9 - 20    Calcium 8.9 8.6 - 10.4 mg/dL    Total Protein 6.7 6.6 - 8.7 g/dL    Albumin 3.7 3.5 - 5.2 g/dL    Albumin/Globulin Ratio 1.2 1.0 - 2.5    Total Bilirubin 0.7 0.00 - 1.20 mg/dL    Alkaline Phosphatase 123 (H) 35 - 104 U/L    ALT 24 10 - 35 U/L    AST 40 (H) 10 - 35 U/L   Magnesium   Result Value Ref Range    Magnesium 1.9 1.7 - 2.3 mg/dL   Brain Natriuretic Peptide   Result Value Ref Range    NT Pro-BNP 2,091 (H) 0 - 450 pg/mL   Troponin   Result Value Ref Range     below:  CBC with no leukocytosis or significant anemia  CMP with stable renal and liver function, no electrolyte derangement  Trop 15>  BNP 2k  Magnesium 1.9  Lactic  normal  Blood culture sent  Respiratory panel sent  COVID and flu negative    CT CHEST PULMONARY EMBOLISM W CONTRAST   Final Result   1. No evidence of pulmonary embolism.   2. Moderate cardiomegaly.   3. Enlargement of the central pulmonary arteries suggesting pulmonary   hypertension.   4. Diffuse reticular interstitial opacities in both lungs suggesting chronic   interstitial lung disease.   5. Moderate mediastinal and hilar lymphadenopathy suggesting reactive   lymphoid hyperplasia.  Small         XR CHEST PORTABLE   Final Result   Chronic interstitial lung disease.               Patient administered:  Orders Placed This Encounter   Medications    DISCONTD: ipratropium 0.5 mg-albuterol 2.5 mg (DUONEB) 0.5-2.5 (3) MG/3ML nebulizer solution     Adriana Bowman: cabinet override    ipratropium 0.5 mg-albuterol 2.5 mg (DUONEB) nebulizer solution 3 Dose     Initiate RT Bronchodilator Protocol:   No    ipratropium 0.5 mg-albuterol 2.5 mg (DUONEB) 0.5-2.5 (3) MG/3ML nebulizer solution     Adriana Bowman: cabinet override    methylPREDNISolone sodium succ (SOLU-MEDROL) 125 mg in sterile water 2 mL injection    albuterol (PROVENTIL) (2.5 MG/3ML) 0.083% nebulizer solution 2.5 mg     Initiate RT Bronchodilator Protocol:   Yes - Inpatient Protocol    iopamidol (ISOVUE-370) 76 % injection 75 mL     Spoke to daughter extensively and it appears that she had some confusion of differences of her CODE STATUS.  Did explain to daughter pretty extensively of results and chronic lung issues and daughter understands that there are no acute findings and patient is decompensating due to her chronic issues.  She states that she will speak to her sister and they are interested in hospice and palliative care.    At this time she states she wants patient to be DNR CCA until

## 2025-04-29 NOTE — CARE COORDINATION
04/29/25 1459   Readmission Assessment   Number of Days since last admission? 8-30 days   Previous Disposition Home with Family   Who is being Interviewed Caregiver   What was the patient's/caregiver's perception as to why they think they needed to return back to the hospital? Other (Comment)  (Patient having trouble breathing)   Did you visit your Primary Care Physician after you left the hospital, before you returned this time? Yes   Did you see a specialist, such as Cardiac, Pulmonary, Orthopedic Physician, etc. after you left the hospital? No   Who advised the patient to return to the hospital? Self-referral   Does the patient report anything that got in the way of taking their medications? No   In our efforts to provide the best possible care to you and others like you, can you think of anything that we could have done to help you after you left the hospital the first time, so that you might not have needed to return so soon? Other (Comment)  (No)

## 2025-04-29 NOTE — ED NOTES
Daughter with pt, daughter states pt has been receiving breathing treatments every 3 hours at home. Pt admitted last month for pneumonia and d[facility] on home O2.   
[facility]  
[facility] signed   
RT called, audible wheezing noted  
declines

## 2025-04-29 NOTE — PROGRESS NOTES
Spoke with daughter/DPOAHC, Cindy Cabral, this p.m. re:  Patient discharge planning.  Patient is a 89 year old , white female, admitted with a diagnosis of Acute on Chronic Respiratory Failure with Hypoxia.  She is a readmission.  Daughter reports that she would like to bring Patient home with her and continued services through [facility].  States that she will consider hospice but wants to wait until her sister comes from her home in [place] in the middle of May before she will make a decision.      Patient resides with her daughter at home in [place].  She uses a wheeled walker, grab bars, shower chair, elevated toilet seat and bed rails on her bed for assistance at home.  Daughter requesting hospital bed at discharge for assistance caring for Patient.  Sticky note left for physician re:  same.  Patient current with [facility] by [facility].  Daughter provides assistance with ADL's and also transportation for Patient as needed.     PCP is Dr. Amandeep Ramos.  Patient has Medicare and  insurance coverage and daughter denies needing any further assistance with the cost of her prescription medication at this time.    Discharge plan is home with family and [facility] to resume services.  Patient has a 'DNRCCA' order in place and medical directives on file.  LSW following and will assist with discharge needs as appropriate.      Floresita Garrett, TONA  4/29/2025

## 2025-04-29 NOTE — PROGRESS NOTES
Physical Therapy  Facility/Department: [facility] MED SURG  Physical Therapy Initial Assessment    Name: Radha Coburn  : 1933  MRN: [id number]  Date of Service: 2025    Discharge Recommendations:  Continue to assess pending progress, Home with Home health PT   PT Equipment Recommendations  Equipment Needed: No      Patient Diagnosis(es): The primary encounter diagnosis was Dyspnea and respiratory abnormalities. A diagnosis of DNR (do not resuscitate) discussion was also pertinent to this visit.  Past Medical History:  has a past medical history of Allergic rhinitis, Anxiety, Arthritis, DVT (deep venous thrombosis) ([facility]), Essential hypertension, Hyperlipidemia, Hypertensive urgency, Impaired fasting glucose, Osteoarthritis of right hip / Total Right Arthroplasty , Osteoarthritis of right knee / Right TKA , PONV (postoperative nausea and vomiting), Shingles, and Stroke ([facility]).  Past Surgical History:  has a past surgical history that includes Appendectomy; Total hip arthroplasty (Right, 2014); Knee Arthroplasty (Right, ); Breast surgery (Left); eye surgery; joint replacement (Left, 2016); Knee arthroscopy (Left, 2016); and Injection Procedure For Sacroiliac Joint (Right, 10/30/2019).    Assessment  Body Structures, Functions, Activity Limitations Requiring Skilled Therapeutic Intervention: Decreased functional mobility ;Decreased ADL status;Decreased ROM;Decreased body mechanics;Decreased tolerance to work activity;Decreased strength;Decreased balance;Decreased endurance;Decreased high-level IADLs;Increased pain;Decreased posture  Assessment: The patient is a 89 y.o. female who was admitted due to dyspnea and respiratory abnormalities.  On evaluation she demonstrates LE weakness, decreased activity endurance and impaired balance.  She would benefit from skilled PT to address her deficits to improve functional mobility.  Treatment Diagnosis: decreased activity endurance, impaired

## 2025-04-29 NOTE — PROGRESS NOTES
Voicemail left for Cindy requesting return phone call.     Evelin Crowley RN  [facility]   Palliative Care Nurse Coordinator  4/29/2025 2:18 PM

## 2025-04-29 NOTE — PROGRESS NOTES
Occupational Therapy  Facility/Department: [facility] MED SURG  Occupational Therapy Initial Assessment    Name: Radha Coburn  : 1933  MRN: [id number]  Date of Service: 2025    Discharge Recommendations:  Continue to assess pending progress, Subacute/Skilled Nursing Facility          Patient Diagnosis(es): The primary encounter diagnosis was Dyspnea and respiratory abnormalities. A diagnosis of DNR (do not resuscitate) discussion was also pertinent to this visit.  Past Medical History:  has a past medical history of Allergic rhinitis, Anxiety, Arthritis, DVT (deep venous thrombosis) ([facility]), Essential hypertension, Hyperlipidemia, Hypertensive urgency, Impaired fasting glucose, Osteoarthritis of right hip / Total Right Arthroplasty , Osteoarthritis of right knee / Right TKA , PONV (postoperative nausea and vomiting), Shingles, and Stroke ([facility]).  Past Surgical History:  has a past surgical history that includes Appendectomy; Total hip arthroplasty (Right, 2014); Knee Arthroplasty (Right, ); Breast surgery (Left); eye surgery; joint replacement (Left, 2016); Knee arthroscopy (Left, 2016); and Injection Procedure For Sacroiliac Joint (Right, 10/30/2019).    Treatment Diagnosis: weakness      Assessment  Performance deficits / Impairments: Decreased functional mobility ;Decreased cognition;Decreased ADL status;Decreased endurance;Decreased ROM;Decreased strength;Decreased balance;Decreased coordination;Decreased safe awareness  Assessment: 89 y/o F admitted to [facility] for SOB.  Patient presents with generalized weakness and deconditioning, requiring increased need for assist during ADL. Patient would benefit from OT services to address to ensure safe and indep return home.  Treatment Diagnosis: weakness  Prognosis: Good;Fair  Decision Making: Medium Complexity  REQUIRES OT FOLLOW-UP: Yes     Plan  Occupational Therapy Plan  Times Per Day: Once a day  Days Per Week: 7 Days  Current  Treatment Recommendations: Strengthening, ROM, Balance training, Functional mobility training, Endurance training, Safety education & training, Patient/Caregiver education & training, Equipment evaluation, education, & procurement, Cognitive reorientation, Self-Care / ADL    Restrictions  Restrictions/Precautions  Restrictions/Precautions: Fall Risk, General Precautions  Activity Level: Up with Assist  Required Braces or Orthoses?: No    Subjective  General  Patient assessed for rehabilitation services?: Yes     Social/Functional History  Social/Functional History  Additional Comments: Patient lives at home and had home health.  Patient unable to answer social history.    Objective       Observation/Palpation  Posture: Fair  Safety Devices  Type of Devices: All fall risk precautions in place  Restraints  Restraints Initially in Place: No    AROM: Generally decreased, functional  PROM: Within functional limits  Strength: Generally decreased, functional  Coordination: Generally decreased, functional  Tone: Normal  Sensation: Intact  ADL  Feeding: Independent  Grooming: Contact guard assistance  Grooming Skilled Clinical Factors: standing at sink, SBA seated  UE Bathing: Stand by assistance  LE Bathing: Moderate assistance  UE Dressing: Stand by assistance  LE Dressing: Moderate assistance  Putting On/Taking Off Footwear: Maximum assistance  Toileting: Minimal assistance  Toileting Skilled Clinical Factors: mod A CM; LOB standing at toielting during CM with min A for correction  Functional Mobility: Contact guard assistance  Functional Mobility Skilled Clinical Factors: FWW  Additional Comments: CGA ADL transfers     Activity Tolerance  Activity Tolerance: Patient tolerated treatment well        Vision  Vision: Impaired  Vision Exceptions: Wears glasses at all times  Cognition  Overall Cognitive Status: WFL  Orientation  Overall Orientation Status: Within Functional Limits                  Education Given To:

## 2025-04-29 NOTE — PROGRESS NOTES
Patient arrived to the floor. Assessment and vitals obtained. Patient alert and oriented x4. Patient complaining of shortness of breath. Expiratory wheezes noted. Lower extremities with 2+ edema. Attempted to urinate, but unable at this time. Admission questions reviewed. Call light within reach and bed alarm set. Patient without hearing aids or dentures in place. States her family has these. States she uses reading glasses.

## 2025-04-30 VITALS
SYSTOLIC BLOOD PRESSURE: 134 MMHG | BODY MASS INDEX: 26.06 KG/M2 | DIASTOLIC BLOOD PRESSURE: 73 MMHG | HEIGHT: 67 IN | WEIGHT: 166.06 LBS | RESPIRATION RATE: 20 BRPM | TEMPERATURE: 97.4 F | HEART RATE: 89 BPM | OXYGEN SATURATION: 96 %

## 2025-04-30 PROBLEM — E44.0 MODERATE MALNUTRITION: Status: ACTIVE | Noted: 2025-04-30

## 2025-04-30 LAB
ANION GAP SERPL CALCULATED.3IONS-SCNC: 11 MMOL/L (ref 9–16)
BASOPHILS # BLD: 0 K/UL (ref 0–0.2)
BASOPHILS NFR BLD: 0 % (ref 0–2)
BUN SERPL-MCNC: 16 MG/DL (ref 8–23)
BUN/CREAT SERPL: 27 (ref 9–20)
CALCIUM SERPL-MCNC: 9.1 MG/DL (ref 8.6–10.4)
CHLORIDE SERPL-SCNC: 93 MMOL/L (ref 98–107)
CO2 SERPL-SCNC: 28 MMOL/L (ref 20–31)
CREAT SERPL-MCNC: 0.6 MG/DL (ref 0.5–0.9)
EOSINOPHIL # BLD: 0.12 K/UL (ref 0–0.44)
EOSINOPHILS RELATIVE PERCENT: 1 % (ref 1–4)
ERYTHROCYTE [DISTWIDTH] IN BLOOD BY AUTOMATED COUNT: 13.9 % (ref 11.8–14.4)
GFR, ESTIMATED: 86 ML/MIN/1.73M2
GLUCOSE SERPL-MCNC: 172 MG/DL (ref 74–99)
HCT VFR BLD AUTO: 38.3 % (ref 36.3–47.1)
HGB BLD-MCNC: 12.3 G/DL (ref 11.9–15.1)
IMM GRANULOCYTES # BLD AUTO: 0 K/UL (ref 0–0.3)
IMM GRANULOCYTES NFR BLD: 0 %
LYMPHOCYTES NFR BLD: 1.64 K/UL (ref 1.1–3.7)
LYMPHOCYTES RELATIVE PERCENT: 14 % (ref 24–43)
MCH RBC QN AUTO: 29.6 PG (ref 25.2–33.5)
MCHC RBC AUTO-ENTMCNC: 32.1 G/DL (ref 28.4–34.8)
MCV RBC AUTO: 92.3 FL (ref 82.6–102.9)
MONOCYTES NFR BLD: 0.94 K/UL (ref 0.1–1.2)
MONOCYTES NFR BLD: 8 % (ref 3–12)
MORPHOLOGY: NORMAL
NEUTROPHILS NFR BLD: 77 % (ref 36–65)
NEUTS SEG NFR BLD: 9 K/UL (ref 1.5–8.1)
NRBC BLD-RTO: 0 PER 100 WBC
PLATELET # BLD AUTO: 188 K/UL (ref 138–453)
PMV BLD AUTO: 9.9 FL (ref 8.1–13.5)
POTASSIUM SERPL-SCNC: 4.7 MMOL/L (ref 3.7–5.3)
RBC # BLD AUTO: 4.15 M/UL (ref 3.95–5.11)
SODIUM SERPL-SCNC: 132 MMOL/L (ref 136–145)
WBC OTHER # BLD: 11.7 K/UL (ref 3.5–11.3)

## 2025-04-30 PROCEDURE — 97116 GAIT TRAINING THERAPY: CPT

## 2025-04-30 PROCEDURE — 6360000002 HC RX W HCPCS: Performed by: NURSE PRACTITIONER

## 2025-04-30 PROCEDURE — 6370000000 HC RX 637 (ALT 250 FOR IP): Performed by: NURSE PRACTITIONER

## 2025-04-30 PROCEDURE — 2500000003 HC RX 250 WO HCPCS: Performed by: NURSE PRACTITIONER

## 2025-04-30 PROCEDURE — 94640 AIRWAY INHALATION TREATMENT: CPT

## 2025-04-30 PROCEDURE — 94761 N-INVAS EAR/PLS OXIMETRY MLT: CPT

## 2025-04-30 PROCEDURE — 2700000000 HC OXYGEN THERAPY PER DAY

## 2025-04-30 PROCEDURE — 97530 THERAPEUTIC ACTIVITIES: CPT

## 2025-04-30 PROCEDURE — 36415 COLL VENOUS BLD VENIPUNCTURE: CPT

## 2025-04-30 PROCEDURE — 97110 THERAPEUTIC EXERCISES: CPT

## 2025-04-30 PROCEDURE — 85025 COMPLETE CBC W/AUTO DIFF WBC: CPT

## 2025-04-30 PROCEDURE — 80048 BASIC METABOLIC PNL TOTAL CA: CPT

## 2025-04-30 RX ORDER — GUAIFENESIN 200 MG/10ML
400 LIQUID ORAL 2 TIMES DAILY
Qty: 280 ML | Refills: 0 | Status: SHIPPED | OUTPATIENT
Start: 2025-04-30 | End: 2025-05-07

## 2025-04-30 RX ADMIN — POLYETHYLENE GLYCOL 3350 17 G: 17 POWDER, FOR SOLUTION ORAL at 10:26

## 2025-04-30 RX ADMIN — FUROSEMIDE 40 MG: 40 TABLET ORAL at 10:27

## 2025-04-30 RX ADMIN — GUAIFENESIN 400 MG: 300 SOLUTION ORAL at 10:26

## 2025-04-30 RX ADMIN — LANSOPRAZOLE 30 MG: 15 TABLET, ORALLY DISINTEGRATING, DELAYED RELEASE ORAL at 10:27

## 2025-04-30 RX ADMIN — WATER 60 MG: 1 INJECTION INTRAMUSCULAR; INTRAVENOUS; SUBCUTANEOUS at 04:22

## 2025-04-30 RX ADMIN — CARVEDILOL 3.12 MG: 3.12 TABLET, FILM COATED ORAL at 10:27

## 2025-04-30 RX ADMIN — ENOXAPARIN SODIUM 40 MG: 100 INJECTION SUBCUTANEOUS at 10:26

## 2025-04-30 RX ADMIN — SODIUM CHLORIDE, PRESERVATIVE FREE 10 ML: 5 INJECTION INTRAVENOUS at 10:27

## 2025-04-30 RX ADMIN — ASPIRIN 81 MG: 81 TABLET, CHEWABLE ORAL at 10:26

## 2025-04-30 RX ADMIN — IPRATROPIUM BROMIDE AND ALBUTEROL SULFATE 1 DOSE: .5; 2.5 SOLUTION RESPIRATORY (INHALATION) at 11:03

## 2025-04-30 RX ADMIN — IPRATROPIUM BROMIDE AND ALBUTEROL SULFATE 1 DOSE: .5; 2.5 SOLUTION RESPIRATORY (INHALATION) at 05:02

## 2025-04-30 NOTE — PROGRESS NOTES
Occupational Therapy  Facility/Department: [facility] MED SURG  Daily Treatment Note  NAME: Radha Coburn  : 1933  MRN: [id number]    Date of Service: 2025    Discharge Recommendations:  Continue to assess pending progress, Subacute/Skilled Nursing Facility         Patient Diagnosis(es): The primary encounter diagnosis was Dyspnea and respiratory abnormalities. Diagnoses of DNR (do not resuscitate) discussion, Human metapneumovirus (hMPV) pneumonia, and Acute on chronic respiratory failure with hypoxia (HCC) were also pertinent to this visit.     Assessment   Activity Tolerance: Patient tolerated treatment well  Discharge Recommendations: Continue to assess pending progress;Subacute/Skilled Nursing Facility     Plan  Occupational Therapy Plan  Times Per Day: Once a day  Days Per Week: 7 Days  Current Treatment Recommendations: Strengthening;ROM;Balance training;Functional mobility training;Endurance training;Safety education & training;Patient/Caregiver education & training;Equipment evaluation, education, & procurement;Cognitive reorientation;Self-Care / ADL    Restrictions  Restrictions/Precautions: Fall Risk, General Precautions  Activity Level: Up with Assist     Subjective  Subjective  Subjective: Pt sitting up in chair upon arrival, gesturing need to use the bathroom. Pt nodded yes to agree to OT tx.  Pain: Pt non verbal, but shook head to deny pain.  Orientation  Overall Orientation Status: Within Functional Limits  Pain: denies  Cognition  Cognition Comment: Unable to accurately assess due to verbal communication limitations.       Objective  Vitals  Vitals  O2 Device: Nasal cannula  Comment: 2L O2 throughout tx.       ADL  Grooming: Contact guard assistance  Grooming Skilled Clinical Factors: CGA standing at sink for hair combing and face washing.  UE Dressing: Stand by assistance;Setup  LE Dressing: Moderate assistance  LE Dressing Skilled Clinical Factors: A needed to don brief and pajama

## 2025-04-30 NOTE — PROGRESS NOTES
Vitals and assessment completed at this time as charted. Pt sitting up in chair. Alert and oriented x4 just has trouble saying words at times. Denies pain. On 2L oxygen. States she is going home today. Denies other concerns.

## 2025-04-30 NOTE — PLAN OF CARE
Problem: Chronic Conditions and Co-morbidities  Goal: Patient's chronic conditions and co-morbidity symptoms are monitored and maintained or improved  Outcome: Progressing     Problem: Discharge Planning  Goal: Discharge to home or other facility with appropriate resources  Outcome: Progressing     Problem: Safety - Adult  Goal: Free from fall injury  Outcome: Progressing  Note: Call light and bedside table with in reach. Bed and chair wheels locked at all times, with bed in lowest position. Frequent checks and needs meet in appropriate timing.      Problem: ABCDS Injury Assessment  Goal: Absence of physical injury  Outcome: Progressing     Problem: Skin/Tissue Integrity  Goal: Skin integrity remains intact  Description: 1.  Monitor for areas of redness and/or skin breakdown2.  Assess vascular access sites hourly3.  Every 4-6 hours minimum:  Change oxygen saturation probe site4.  Every 4-6 hours:  If on nasal continuous positive airway pressure, respiratory therapy assess nares and determine need for appliance change or resting period  Outcome: Progressing  Note: Hua scale monitoring. inspect skin for breakdown. Chart all shin breakdown. Encourage frequent repositioning. No new breakdown.     Problem: Respiratory - Adult  Goal: Achieves optimal ventilation and oxygenation  Outcome: Progressing

## 2025-04-30 NOTE — PROGRESS NOTES
7.2 11.7*   RBC 4.33 4.15   HGB 13.2 12.3   HCT 40.2 38.3   MCV 92.8 92.3   MCH 30.5 29.6   MCHC 32.8 32.1   RDW 13.6 13.9    188   MPV 9.6 9.9        Last 3 Blood Glucose:   Recent Labs     04/29/25  0335 04/30/25  0555   GLUCOSE 145* 172*        Comprehensive Metabolic Profile:   Recent Labs     04/29/25  0335 04/30/25  0555   * 132*   K 4.3 4.7   CL 93* 93*   CO2 28 28   BUN 13 16   CREATININE 0.5 0.6   GLUCOSE 145* 172*   CALCIUM 8.9 9.1   BILITOT 0.7  --    ALKPHOS 123*  --    AST 40*  --    ALT 24  --         Urinalysis:   Lab Results   Component Value Date/Time    NITRU NEGATIVE 04/01/2025 02:50 AM    COLORU Yellow 04/01/2025 02:50 AM    PHUR 7.5 04/01/2025 02:50 AM    PHUR 7.0 06/27/2023 03:50 PM    WBCUA 5 TO 10 04/01/2025 02:50 AM    RBCUA 0 TO 2 04/01/2025 02:50 AM    MUCUS TRACE 06/27/2023 03:50 PM    BACTERIA 4+ 04/01/2025 02:50 AM    LEUKOCYTESUR SMALL 04/01/2025 02:50 AM    UROBILINOGEN ELEVATED 04/01/2025 02:50 AM    BILIRUBINUR NEGATIVE 04/01/2025 02:50 AM    GLUCOSEU NEGATIVE 04/01/2025 02:50 AM    KETUA TRACE 04/01/2025 02:50 AM    AMORPHOUS TRACE 02/20/2025 01:41 AM       HgBA1c:    Lab Results   Component Value Date/Time    LABA1C 6.5 01/31/2025 06:12 AM       Lactic Acid:   Lab Results   Component Value Date/Time    LACTA 1.4 04/29/2025 03:35 AM    LACTA 1.7 03/31/2025 09:50 PM    LACTA 1.2 11/07/2024 12:36 PM        Troponin: No results for input(s): \"TROPONINI\" in the last 72 hours.    CRP:  No results for input(s): \"CRP\" in the last 72 hours.      Radiology/Imaging:  CT CHEST PULMONARY EMBOLISM W CONTRAST   Final Result   1. No evidence of pulmonary embolism.   2. Moderate cardiomegaly.   3. Enlargement of the central pulmonary arteries suggesting pulmonary   hypertension.   4. Diffuse reticular interstitial opacities in both lungs suggesting chronic   interstitial lung disease.   5. Moderate mediastinal and hilar lymphadenopathy suggesting reactive   lymphoid hyperplasia.   Therapy: continue current nutrition therapy and oral supplements    Electronically signed by KARLIE Zaidi CNP on 4/30/25 at 7:30 AM EDT     Hospital Prophylaxis:   DVT: Lovenox   Stress Ulcer: PPI     Disposition:  Shared decision making: All test results, treatment options and disposition options were discussed with the patient today  Social determinants of health that may impact management: none  Code status: DNR-CCA   Disposition: Discharge plan is home    [facility] Advanced Care Planning documentation:  [x] I have confirmed that the patient's Advance Care Plan is present, Code Status is documented, or surrogate decision maker is listed in the patient's medical record  [If \"yes\", STOP HERE]     [] The patient's Advance Care Plan is NOT present because:    []  I confirmed today that the patient does not wish or was not able to name a   surrogate decision maker or provide and advance care plan.    [] Hospice care is currently being provided or has been provided within the   calendar year.    []  I did NOT confirm today the presence of an Advance Care Plan or surrogate   decision maker documented within the patient's medical record.   [DOES NOT SATISFY MIPS PERFORMANCE]    KARLIE Zaidi CNP , KARLIE, NP-C  Hospitalist Medicine        4/30/2025, 1:22 PM

## 2025-04-30 NOTE — PROGRESS NOTES
Discharge instructions provided to daughter at this time. The only new medication is Robitussin which was sent to [facility]. Daughter stated she already got the text that it is ready. Pt remains at baseline 2 liters of oxygen. Pt to follow up with Dr. Ramos in the office 5/13 and will be followed by [facility] who will resume care on discharge. Pt family took all personal belongings with them at discharge.

## 2025-04-30 NOTE — DISCHARGE SUMMARY
Noted    Atrial flutter (HCC) 02/01/2025    Moderate malnutrition 04/30/2025    Acute on chronic respiratory failure with hypoxia (HCC) 04/29/2025    Human metapneumovirus (hMPV) pneumonia 04/29/2025    Community acquired bacterial pneumonia 04/01/2025    Hypoxia 04/01/2025    Constipation 04/01/2025    Hx of ischemic left MCA stroke 03/19/2025    Global aphasia 01/31/2025    Atrial fibrillation (HCC) 01/30/2025    Global aphasia 01/30/2025    Acute respiratory failure with hypoxia (HCC) 11/07/2024    Chronic diastolic CHF (congestive heart failure), NYHA class 2 ([facility]) 11/07/2024    Hyponatremia 11/07/2024    Right internal carotid artery aneurysm 07/07/2023    Type 2 diabetes mellitus without complication, without long-term current use of insulin ([facility]) 04/12/2021    Sacroiliac joint dysfunction of right side 10/30/2019    Piriformis syndrome of right side 10/30/2019    Right carpal tunnel syndrome 02/25/2019    HLA B27 (HLA B27 positive) suspect ankylosing spondylitis / normal x-ray lumbar spine 2017 01/31/2018    Essential hypertension /  edema from amlodipine /   Hyponatremia from hydrochlorothiazide         Discharge Medications:         Medication List        START taking these medications      guaiFENesin 100 MG/5ML liquid  Commonly known as: ROBITUSSIN  Take 20 mLs by mouth in the morning and at bedtime for 7 days            CONTINUE taking these medications      * albuterol sulfate  (90 Base) MCG/ACT inhaler  Commonly known as: Ventolin HFA  Inhale 2 puffs into the lungs daily as needed for Wheezing or Shortness of Breath (SOB/WHEEZING)     * albuterol (2.5 MG/3ML) 0.083% nebulizer solution  Commonly known as: PROVENTIL  Take 3 mLs by nebulization every 4 hours as needed for Wheezing or Shortness of Breath     ALPRAZolam 0.25 MG tablet  Commonly known as: XANAX  Take 1 tablet by mouth daily as needed for Anxiety for up to 30 days. Max Daily Amount: 0.25 mg     aspirin EC 81 MG EC tablet  Take 1 tablet  by mouth daily     carvedilol 3.125 MG tablet  Commonly known as: COREG  Take 1 tablet by mouth 2 times daily (with meals)     cloNIDine 0.3 MG/24HR Ptwk  Commonly known as: Catapres-TTS-3  Place 1 patch onto the skin every 7 days     furosemide 40 MG tablet  Commonly known as: Lasix  Take 1 tablet by mouth daily     Handicap Placard Misc  by Does not apply route Duration;4 years     lidocaine 4 % external patch  Place 1 patch onto the skin daily     mirtazapine 7.5 MG tablet  Commonly known as: REMERON  Take 1 tablet by mouth nightly     omeprazole 20 MG delayed release capsule  Commonly known as: PRILOSEC  Take 1 capsule by mouth Daily     ondansetron 4 MG disintegrating tablet  Commonly known as: ZOFRAN-ODT  Take 1 tablet by mouth 3 times daily as needed for Nausea or Vomiting     polyethylene glycol 17 g packet  Commonly known as: GLYCOLAX  Take 1 packet by mouth daily     rosuvastatin 40 MG tablet  Commonly known as: CRESTOR  Take 1 tablet by mouth nightly           * This list has 2 medication(s) that are the same as other medications prescribed for you. Read the directions carefully, and ask your doctor or other care provider to review them with you.                   Where to Get Your Medications        These medications were sent to [facility] - [place], [place] - [address] [place] [address] [phone] - F [phone]  [address] [place] [id number]      Phone: [phone]   guaiFENesin 100 MG/5ML liquid         Patient Instructions:   Activity: activity as tolerated  Diet: regular diet  Wound Care: none needed  Other: na     Disposition:   Discharge to Home with home health    Follow up:  Patient will be followed by Amandeep Ramos MD in 1-2 weeks    CORE MEASURES on Discharge (if applicable)  ACE/ARB in CHF: NA  Statin in [place]: NA  ASA in [place]: NA  Statin in CVA: NA  Antiplatelet in CVA: NA    Total time spent on discharge services: 40 minutes    Including the following activities:  Evaluation

## 2025-04-30 NOTE — PROGRESS NOTES
Physical Therapy  Facility/Department: [facility] MED SURG  Daily Treatment Note  NAME: Radha Coburn  : 1933  MRN: [id number]    Date of Service: 2025    Discharge Recommendations:  Continue to assess pending progress, Home with Home health PT       Patient Diagnosis(es): The primary encounter diagnosis was Dyspnea and respiratory abnormalities. A diagnosis of DNR (do not resuscitate) discussion was also pertinent to this visit.    Assessment  Assessment: Transfers:CGA/SBA. Pt ambulated 15ftx2 with FWW and CGA/SBA for safety. Commode use and transfer--SBA. Pt stood at sinkside for performance of ADL tasks with SBA and no LOB noted. Pt completed seated B LE therex x15 in all planes of motion.  Activity Tolerance: Patient tolerated treatment well    Plan  Physical Therapy Plan  General Plan: 2 times a day 7 days a week  Specific Instructions for Next Treatment: 1x/ daily on weekends  Current Treatment Recommendations: Strengthening;ROM;Balance training;Functional mobility training;Transfer training;ADL/Self-care training;IADL training;Endurance training;Gait training;Stair training;Neuromuscular re-education;Manual;Home exercise program;Safety education & training;Patient/Caregiver education & training;Therapeutic activities;Positioning    Restrictions  Restrictions/Precautions  Restrictions/Precautions: Fall Risk, General Precautions  Activity Level: Up with Assist  Required Braces or Orthoses?: No     Subjective   Subjective  Subjective: Pt with RN at this time, ambulating to the bathroom, agreeable to work with therapy  Pain: denies    Objective  Bed Mobility Training  Bed Mobility Training: No  Transfer Training  Transfer Training: Yes  Overall Level of Assistance: Stand by assistance  Interventions: Verbal cues  Sit to Stand: Stand by assistance  Stand to Sit: Stand by assistance  Gait  Gait Training: Yes  Overall Level of Assistance: Contact guard assistance;Stand by assistance  Distance (ft): 30

## 2025-04-30 NOTE — PLAN OF CARE
Problem: Chronic Conditions and Co-morbidities  Goal: Patient's chronic conditions and co-morbidity symptoms are monitored and maintained or improved  4/30/2025 1601 by Roslyn Villarreal RN  Outcome: Adequate for Discharge  4/30/2025 0338 by Mary Carmen Matos RN  Outcome: Progressing     Problem: Discharge Planning  Goal: Discharge to home or other facility with appropriate resources  4/30/2025 1601 by Roslyn Villarreal RN  Outcome: Adequate for Discharge  4/30/2025 0338 by Mray Carmen Matos RN  Outcome: Progressing     Problem: Safety - Adult  Goal: Free from fall injury  4/30/2025 1601 by Roslyn Villarreal RN  Outcome: Adequate for Discharge  4/30/2025 0338 by Mary Carmen Matos RN  Outcome: Progressing  Note: Call light and bedside table with in reach. Bed and chair wheels locked at all times, with bed in lowest position. Frequent checks and needs meet in appropriate timing.      Problem: ABCDS Injury Assessment  Goal: Absence of physical injury  4/30/2025 1601 by Roslyn Villarreal RN  Outcome: Adequate for Discharge  4/30/2025 0338 by Mary Carmen Matos RN  Outcome: Progressing     Problem: Skin/Tissue Integrity  Goal: Skin integrity remains intact  Description: 1.  Monitor for areas of redness and/or skin breakdown2.  Assess vascular access sites hourly3.  Every 4-6 hours minimum:  Change oxygen saturation probe site4.  Every 4-6 hours:  If on nasal continuous positive airway pressure, respiratory therapy assess nares and determine need for appliance change or resting period  4/30/2025 1601 by Roslyn Villarreal RN  Outcome: Adequate for Discharge  4/30/2025 0338 by Mary Carmen Matos RN  Outcome: Progressing  Note: Hua scale monitoring. inspect skin for breakdown. Chart all shin breakdown. Encourage frequent repositioning. No new breakdown.     Problem: Respiratory - Adult  Goal: Achieves optimal ventilation and oxygenation  4/30/2025 1601 by Roslyn Villarreal RN  Outcome: Adequate for Discharge  4/30/2025

## 2025-04-30 NOTE — PROGRESS NOTES
Patient discharging today with family.  Home health care to resume through [facility].  Discharge paperwork FAXED to [facility] by [employer] per this writer.    TONA Zhao  4/30/2025

## 2025-04-30 NOTE — PROGRESS NOTES
Comprehensive Nutrition Assessment    Type and Reason for Visit:  Initial    Nutrition Recommendations/Plan:   Consuming >50% of pt meals.  Consuming >75% of ONS.  F/U on swallowing difficulty.      Malnutrition Assessment:  Malnutrition Status:  Moderate malnutrition (04/30/25 0856)    Context:  Chronic Illness     Findings of the 6 clinical characteristics of malnutrition:  Energy Intake:  Mild decrease in energy intake  Weight Loss:  Greater than 10% over 6 months     Body Fat Loss:  Mild body fat loss Orbital   Muscle Mass Loss:  Mild muscle mass loss Temples (temporalis), Hand (interosseous)  Fluid Accumulation:  Mild (BLE +2)     Strength:  Not Performed    Nutrition Assessment:    Inadequate oral intake r/t decreased appetite aeb consuming less than 25% of meals. Decreased meal consumption has resulted in about 10% weight loss in 6 months. Mild body fat loss in the orbtial region and mild muscle mass loss in the hands and temples. Signaling for moderate malnutrion. Pt could not communicate well due to stroke but was able to relay that there's no swallowing difficulty. ONS has been consumed %. Labs reviewed with decreased Na (132) and Chloride (93). Increased glucose with a A1C of 6.5% taken in January. Swallow study may be beneficial to ensure adequate nutritional needs.    Nutrition Related Findings:    active bowel sounds and BLE +2 edema Wound Type: None       Current Nutrition Intake & Therapies:    Average Meal Intake: 1-25%  Average Supplements Intake: %  ADULT DIET; Easy to Chew  ADULT ORAL NUTRITION SUPPLEMENT; Breakfast, Lunch, Dinner; Standard High Calorie/High Protein Oral Supplement    Anthropometric Measures:  Height: 170.2 cm (5' 7.01\")  Ideal Body Weight (IBW): 135 lbs (61 kg)    Admission Body Weight: 77.1 kg (170 lb)  Current Body Weight: 75.3 kg (166 lb 0.1 oz), 123 % IBW. Weight Source: Bed scale  Current BMI (kg/m2): 26           Weight Adjustment For: No Adjustment

## 2025-05-01 ENCOUNTER — CARE COORDINATION (OUTPATIENT)
Dept: CARE COORDINATION | Age: 89
End: 2025-05-01

## 2025-05-01 DIAGNOSIS — J12.3 HUMAN METAPNEUMOVIRUS (HMPV) PNEUMONIA: Primary | ICD-10-CM

## 2025-05-01 NOTE — CARE COORDINATION
(Comment: PCP 5/13)  How are you going to get to your appointment?: Car - family or friend to transport  Post Acute Services:  (Comment: Luis Enrique Parmar )  Do you feel like you have everything you need to keep you well at home?: Yes  Care Transitions Interventions          Follow Up Appointment:   Discussed follow up appointments. Patient has hospital follow up appointment scheduled within 14 days of discharge.   Future Appointments         Provider Specialty Dept Phone    5/13/2025 3:30 PM Amandeep Ramos MD Internal Medicine [phone]    5/30/2025 1:50 PM Amandeep Ramos MD Internal Medicine [phone]            Care Transition Nurse provided contact information.  Plan for follow-up call in 6-10 days based on severity of symptoms and risk factors.  Plan for next call: symptom management-check respiratory status - still home oxygen at HS and most of day?  Check cough/expectorant  follow-up appointment- remind/review PCP appt for 5/13    Kerri Tse RN

## 2025-05-08 ENCOUNTER — CARE COORDINATION (OUTPATIENT)
Dept: CARE COORDINATION | Age: 89
End: 2025-05-08

## 2025-05-08 NOTE — CARE COORDINATION
Care Transitions Note    Follow Up Call     Patient Current Location:  Home: [address]  [place] [address]    [facility] Care Coordinator contacted the patient by telephone. Verified name and  as identifiers.    Additional needs identified to be addressed with provider   No needs identified                 Method of communication with provider: none.    Care Summary Note: Writer spoke to pt's daughter Cindy for follow up transitional care call. She reports that pt is feeling much better they all were sick doing better now. Reports pt has productive cough improved unsure what color phlegm is. She continues to wear oxygen at night at 2 lpm. She is eating and drinking fine, B/B wnl. She has home speech today. She does not receive aide services-they declined does not need help with bathing. She is not seeing a nurse at this time. Reminded of PCP appointment on 25. She voiced no needs or concerns at this time. Agreeable to follow up calls.   Writer called  [facility] pt is scheduled today for PT/OT/Speech she currently does not have nursing services. Writer spoke to Nabila.     Plan of care updates since last contact:  Doing better today discussed PCP appointment called [facility]       Advance Care Planning:   Does patient have an Advance Directive: reviewed during previous call, see note. .    Medication Review:  No changes since last call.     Remote Patient Monitoring:  Offered patient enrollment in the Remote Patient Monitoring (RPM) program for in-home monitoring: Patient is not eligible for RPM program because: affiliate provider.    Assessments:  Care Transitions Subsequent and Final Call    Subsequent and Final Calls  Care Transitions Interventions  Other Interventions:              Follow Up Appointment:   Reviewed upcoming appointment(s).  Future Appointments         Provider Specialty Dept Phone    2025 3:30 PM Amandeep Ramos MD Internal Medicine [phone]    2025 1:50 PM

## 2025-05-08 NOTE — PROGRESS NOTES
Physician Progress Note      PATIENT:               JANET FINK  CSN #:                  [id number]  :                       1933  ADMIT DATE:       2025 3:24 AM  DISCH DATE:        2025 4:13 PM  RESPONDING  PROVIDER #:        ISA BEACH APRN - CNP          QUERY TEXT:    Human metapneumovirus pneumonia is documented in the H&P and discharge summary   in the \"active problems\"  Based on your medical judgment, please clarify the   clinical significance of this diagnosis.    The clinical indicators include:  H&P states \"presented to the ER with SOB.  Patient was recently hospitalized   from 3/31/2025-4/3/2025 for PN and was discharged home on ATBs and follow up   with PCP.  She was discharged home with 2L of oxygen at that time.  She   reported that her SOB was progressing.\"    CT of chest \"Diffuse reticular interstitial opacities in both lungs suggesting   chronic  interstitial lung disease.\"    Discharge summary \"During patient's admission labs were monitored electrolytes   replaced.  Oxygen was weaned down to her baseline of 2 L and tolerating well.    Respiratory panel shows human metapneumovirus.  She was given steroids as   well as Robitussin and tolerated well.\"  Options provided:  -- Human metapneumovirus pneumonia  -- Human metapneumovirus pneumonia ruled out, this patient had positive   respiratory culture with Human metapneumovirus  -- Other - I will add my own diagnosis  -- Disagree - Not applicable / Not valid  -- Disagree - Clinically unable to determine / Unknown  -- Refer to Clinical Documentation Reviewer    PROVIDER RESPONSE TEXT:    This patient has Human metapneumovirus pneumonia    Query created by: Margie Santana on 2025 8:50 AM      Electronically signed by:  ISA ZIEGLER - CNP 2025 7:08 AM

## 2025-05-13 PROBLEM — J15.9 COMMUNITY ACQUIRED BACTERIAL PNEUMONIA: Status: RESOLVED | Noted: 2025-04-01 | Resolved: 2025-05-13

## 2025-05-15 ENCOUNTER — CARE COORDINATION (OUTPATIENT)
Dept: CARE COORDINATION | Age: 89
End: 2025-05-15

## 2025-05-15 NOTE — CARE COORDINATION
Amandeep Tyson MD Internal Medicine [phone]            N Care Coordinator provided contact information.  Plan for follow-up call in 6-10 days based on severity of symptoms and risk factors.  Plan for next call: symptom management-how is breathing and appetite? Has she started ST?       Medina Camacho LPN

## 2025-05-23 ENCOUNTER — CARE COORDINATION (OUTPATIENT)
Dept: CARE COORDINATION | Age: 89
End: 2025-05-23

## 2025-05-23 NOTE — CARE COORDINATION
Care Transitions Note    Follow Up Call     Attempted to reach family, daughter  for transitions of care follow up.  Unable to reach family,   .      Outreach Attempts:   HIPAA compliant voicemail left for family,  .     Care Summary Note: 1st attempt.     Follow Up Appointment:   Future Appointments         Provider Specialty Dept Phone    5/30/2025 1:50 PM Amandeep Ramos MD Internal Medicine [phone]            Plan for follow-up call in 6-10 days based on severity of symptoms and risk factors. Plan for next call: symptom management-how is breathing and appetite? Has she started ST?       Medina Camacho LPN

## 2025-05-28 ENCOUNTER — CARE COORDINATION (OUTPATIENT)
Dept: CARE COORDINATION | Age: 89
End: 2025-05-28

## 2025-05-28 NOTE — CARE COORDINATION
Care Transitions Note    Final Call     Patient Current Location:  Home: [address]  [place] [address]    [facility] Care Coordinator contacted the family, daughter Cindy  by telephone. Verified name and  as identifiers.    Patient graduated from the Care Transitions program on .  Patient/family has the ability to self-manage at this time..      Advance Care Planning:   Does patient have an Advance Directive: reviewed and current.    Handoff:   Patient was not referred to the AC team due to patient declined services.      Care Summary Note: Writer spoke with patient's daughter Cindy for her final care transitions call. She states her mom is doing \"great\" and is not having any chest pain,sob or palpitations. She continues to eat better. Cindy denies having any new needs or concerns-will end care transitions at this time.     Assessments:  Care Transitions Subsequent and Final Call    Subsequent and Final Calls  Do you have any ongoing symptoms?: No  Have your medications changed?: No  Do you have any questions related to your medications?: No  Do you currently have any active services?: No  Do you have any needs or concerns that I can assist you with?: No  Identified Barriers: None  Care Transitions Interventions  Other Interventions:              Upcoming Appointments:    Future Appointments         Provider Specialty Dept Phone    2025 1:50 PM Amandeep Ramos MD Internal Medicine [phone]            Patient has agreed to contact primary care provider and/or specialist for any further questions, concerns, or needs.    Medina Camacho LPN

## 2025-05-30 PROBLEM — J12.3 HUMAN METAPNEUMOVIRUS (HMPV) PNEUMONIA: Status: RESOLVED | Noted: 2025-04-29 | Resolved: 2025-05-30

## 2025-06-26 ENCOUNTER — HOSPITAL ENCOUNTER (OUTPATIENT)
Dept: SPEECH THERAPY | Age: 89
Setting detail: THERAPIES SERIES
Discharge: HOME | End: 2025-06-26

## 2025-06-26 NOTE — PROGRESS NOTES
[facility]  Inpatient/Observation/Outpatient Rehabilitation    Date: 2025  Patient Name: Radha Coburn       [] Inpatient Acute/Observation       [x]  Outpatient  : 1933       Plan of Care/Recert ends      [] Pt refused/declined therapy at this time due to:           [x] Pt cancelled due to:  [] No Reason Given   [] Sick/ill   [x] Other:  too hot    [] Evaluation held by RN/Provider/Physical Therapist due to:    [] High Heart Rate   [] High Blood Pressure   [] Orthopedic Consult   [] Hgb < 7   [] Other:    [] Pt ordered brace per physician request:  [] Proper fit will be completed and education for wearing/skin checks    [] Pt does not require skilled services due to:      Therapist/Assistant will attempt to see this patient, at our earliest opportunity.       Laina Riley Date: 2025

## 2025-07-07 ENCOUNTER — HOSPITAL ENCOUNTER (OUTPATIENT)
Dept: SPEECH THERAPY | Age: 89
Setting detail: THERAPIES SERIES
Discharge: HOME | End: 2025-07-07
Payer: MEDICARE

## 2025-07-07 PROCEDURE — 92523 SPEECH SOUND LANG COMPREHEN: CPT

## 2025-07-07 NOTE — THERAPY EVALUATION
deficits/strengths. The pt was administered this assessment in a quiet 1:1 environment.     Part 1 Assessment was as follows:  Subtest/Composite Possible  Patient's Score Comments   Spontaneous Speech  (SS)-Information  Conten 10 8    SS- Fluency  10 4    Spontatneous Speech 20 12 Paraphasias present on address and throughout picture description.    Comprehension: Yes/No questions 60 60    Comprehension: Auditory Word Recognition 60   60    Comp\" Sequential Commants 80 64 Greater difficulty with multi-step instructions with use of objects.    Comprehension Composite  10 9.2    Repetition 100 70    Repetition Composite  10 7 Greater difficulty with repetition with longer utterances.    Naming: Object Naming 60 56 Pt with x2 paraphasias (\"ramirez\" for knife and \"shrewdryer\" for screwdriver) and need for PC/SC on x1 item.    Naming: Word Fluency 20 10 Pt able to name x10 animals.    Naming: Sentence Completion 10 10    Naming: Responsive Speech 10 10    Naming Composite 10 8.6      Aphasia Quotient: 73.6/100    0-25 - Very Severe  26-50 Severe  51-75: Moderate  76+: Mild    Aphasia Type:    COGNITION:  Cognitive skills appear to be grossly intact.    DIAGNOSTIC IMPRESSIONS:   Speech and Language Evaluation: Pt presents with moderate non-fluent aphasia characterized by word finding difficulties, halting speech (jargon-like), and frequent paraphasias. The patient was administered the Western Aphasia Battery (WAB) this date to assess her language deficits/strengths. The pt scored an aphasia quotient score of 73.6/100, indicating moderate severity level of aphasia. The pt had breakdowns in the areas of expressive language; repetition, word fluency, object naming, and picture description. SLP recommends pt be seen for 45 minutes weekly ST to target expressive language deficits.     RECOMMENDATIONS/ASSESSMENT:  Rehabilitation Potential: good  Discharge Recommendations: Ongoing speech therapy is recommended at this level of

## 2025-07-15 NOTE — PROGRESS NOTES
Phone: [phone]                        [facility]    Fax: [phone]                                 Outpatient Speech Therapy                               DAILY TREATMENT NOTE    Date: 7/17/2025  Patient’s Name:  Radha Coburn  YOB: 1933 (89 y.o.)  Gender:  female  MRN:  [id number]  [facility] #: [id number]  Referring physician:Amandeep Ramos    Assessment Summary: R47.01 Aphasia    Precautions:       INSURANCE  Visit Information  SLP Insurance Information: [employer]  Total # of Visits to Date: 2  No Show: 0  Canceled Appointment: 1    ***  Plan of Care/Recert ends    PAIN  []No     []Yes      Pain Rating (0-10 pain scale): ***  Location: *** N/A  Pain Description: *** NA    SUBJECTIVE  Patient presents to clinic with ***     SHORT TERM GOALS/ TREATMENT SESSION:  Subjective report:           ***       Goal 1: Patient will complete expressive language tasks (confrontational naming, divergent naming, responsive naming, etc) with 60% accuracy given minimal verbal cues.     ***     []Met  []Partially met  []Not met   Goal 2: Patient will engage in a 5 minute conversation with <8 paraphasias present throughout.       ***     []Met  []Partially met  []Not met   Goal 3: Patient will utilize word finding strategies (description, association, gestures, etc.) in 85% of opportunities of word-finding difficulty given minimal cues.       ***     []Met  []Partially met  []Not met     *** []Met  []Partially met  []Not met     ***       []Met  []Partially met  []Not met     LONG TERM GOALS/ TREATMENT SESSION:  Goal 1: Patient will improve linguistic function to SHIKHA to help assist in return to PLOF. *** []Met  []Partially met  []Not met     ***       []Met  []Partially met  []Not met       EDUCATION/HOME EXERCISE PROGRAM (HEP)  New Education/HEP provided to patient/family/caregiver:  ***    Method of Education:     []Discussion     []Demonstration    [] Written     []Other  Evaluation of

## 2025-07-17 ENCOUNTER — HOSPITAL ENCOUNTER (OUTPATIENT)
Dept: SPEECH THERAPY | Age: 89
Setting detail: THERAPIES SERIES
Discharge: HOME | End: 2025-07-17
Payer: MEDICARE

## 2025-07-17 NOTE — PROGRESS NOTES
[facility]  Inpatient/Observation/Outpatient Rehabilitation    Date: 2025  Patient Name: Radha Coburn       [] Inpatient Acute/Observation       [x]  Outpatient  : 1933       Plan of Care/Recert ends      [] Pt refused/declined therapy at this time due to:           [x] Pt cancelled due to:  [] No Reason Given   [x] Sick/ill   [] Other:      [] Evaluation held by RN/Provider/Physical Therapist due to:    [] High Heart Rate   [] High Blood Pressure   [] Orthopedic Consult   [] Hgb < 7   [] Other:    [] Pt ordered brace per physician request:  [] Proper fit will be completed and education for wearing/skin checks    [] Pt does not require skilled services due to:      Therapist/Assistant will attempt to see this patient, at our earliest opportunity.       Laina Riley Date: 2025

## (undated) DEVICE — PAIN TRAY: Brand: MEDLINE INDUSTRIES, INC.

## (undated) DEVICE — GLOVE SURG SZ 65 THK91MIL LTX FREE SYN POLYISOPRENE

## (undated) DEVICE — SET EXTN TBNG MINIBOR 20IN

## (undated) DEVICE — AVANOS* UNIVERSAL BLOCK TRAYS: Brand: AVANOS